# Patient Record
Sex: MALE | Race: WHITE | Employment: FULL TIME | URBAN - METROPOLITAN AREA
[De-identification: names, ages, dates, MRNs, and addresses within clinical notes are randomized per-mention and may not be internally consistent; named-entity substitution may affect disease eponyms.]

---

## 2017-03-03 ENCOUNTER — HOSPITAL ENCOUNTER (OUTPATIENT)
Age: 67
Discharge: HOME | End: 2017-03-03
Attending: EMERGENCY MEDICINE
Payer: MEDICARE

## 2017-03-03 ENCOUNTER — APPOINTMENT (OUTPATIENT)
Dept: GENERAL RADIOLOGY | Age: 67
End: 2017-03-03
Attending: EMERGENCY MEDICINE
Payer: MEDICARE

## 2017-03-03 VITALS
BODY MASS INDEX: 28.79 KG/M2 | DIASTOLIC BLOOD PRESSURE: 89 MMHG | RESPIRATION RATE: 16 BRPM | HEART RATE: 101 BPM | WEIGHT: 190 LBS | SYSTOLIC BLOOD PRESSURE: 123 MMHG | TEMPERATURE: 98 F | OXYGEN SATURATION: 98 % | HEIGHT: 68 IN

## 2017-03-03 DIAGNOSIS — S63.501A WRIST SPRAIN, RIGHT, INITIAL ENCOUNTER: Primary | ICD-10-CM

## 2017-03-03 PROCEDURE — 99203 OFFICE O/P NEW LOW 30 MIN: CPT

## 2017-03-03 PROCEDURE — 73110 X-RAY EXAM OF WRIST: CPT

## 2017-03-03 NOTE — ED PROVIDER NOTES
Patient Seen in: [facility] Immediate Care In [address] [place] [address]    History   Patient presents with:  Upper Extremity Injury (musculoskeletal)    Stated Complaint: RIGHT WRIST INJURY    HPI    HPI: Melani Marquez is a 78year old male who presents after an in src --    SpO2 03/03/17 1158 98 %   O2 Device 03/03/17 1158 None (Room air)       Current:/89 mmHg  Pulse 101  Temp([facility]) 97.6 °F (36.4 °C)  Resp 16  Ht 172.7 cm (5' 8\")  Wt 86.183 kg  BMI 28.90 kg/m2  SpO2 98%        Physical Exam      MENTAL STATUS

## 2017-06-03 PROCEDURE — 84443 ASSAY THYROID STIM HORMONE: CPT | Performed by: INTERNAL MEDICINE

## 2017-06-03 PROCEDURE — 80061 LIPID PANEL: CPT | Performed by: INTERNAL MEDICINE

## 2017-06-03 PROCEDURE — 82306 VITAMIN D 25 HYDROXY: CPT | Performed by: INTERNAL MEDICINE

## 2017-06-03 PROCEDURE — 80053 COMPREHEN METABOLIC PANEL: CPT | Performed by: INTERNAL MEDICINE

## 2017-06-03 PROCEDURE — 85025 COMPLETE CBC W/AUTO DIFF WBC: CPT | Performed by: INTERNAL MEDICINE

## 2018-02-19 ENCOUNTER — HOSPITAL ENCOUNTER (OUTPATIENT)
Age: 68
Discharge: HOME | End: 2018-02-19
Attending: PEDIATRICS
Payer: MEDICARE

## 2018-02-19 VITALS
RESPIRATION RATE: 18 BRPM | HEART RATE: 104 BPM | DIASTOLIC BLOOD PRESSURE: 79 MMHG | WEIGHT: 190 LBS | OXYGEN SATURATION: 95 % | BODY MASS INDEX: 28.14 KG/M2 | SYSTOLIC BLOOD PRESSURE: 149 MMHG | HEIGHT: 69 IN | TEMPERATURE: 98 F

## 2018-02-19 DIAGNOSIS — R09.89 FOREIGN BODY SENSATION IN THROAT: Primary | ICD-10-CM

## 2018-02-19 PROCEDURE — 99212 OFFICE O/P EST SF 10 MIN: CPT

## 2018-02-19 NOTE — ED NOTES
List of ENT provided and will call for follow up appointment. Easy non labored resp no drooling speech clear. Will call for follow up.

## 2018-02-19 NOTE — ED PROVIDER NOTES
Patient presents with:  FB in Throat (GI, respiratory)      HPI:     Karmen Gamble is a 75year old male who presents for evaluation of a chief complaint of foreign body sensation around his right sinus.   Patient states he was eating an orange last night an today.    Follow Up with:  Caren Sanchez MD  [address] [facility] [phone]      As needed    Merlin Godinez MD  [place] [address]  [phone]    Schedule an appointment as soon as possible f

## 2018-02-19 NOTE — ED INITIAL ASSESSMENT (HPI)
Eating an orange yesterday and feels he has a seed stuck inside his rt upper gum and went into his sinus. per pt.no visible puncture wound noted on soft palette

## 2018-04-27 ENCOUNTER — HOSPITAL ENCOUNTER (OUTPATIENT)
Dept: BONE DENSITY | Facility: HOSPITAL | Age: 68
Discharge: HOME | End: 2018-04-27
Attending: INTERNAL MEDICINE
Payer: MEDICARE

## 2018-04-27 DIAGNOSIS — M81.0 OSTEOPOROSIS, SENILE: ICD-10-CM

## 2018-04-27 PROCEDURE — 77080 DXA BONE DENSITY AXIAL: CPT | Performed by: INTERNAL MEDICINE

## 2018-06-08 PROCEDURE — 85025 COMPLETE CBC W/AUTO DIFF WBC: CPT | Performed by: INTERNAL MEDICINE

## 2018-06-08 PROCEDURE — 82607 VITAMIN B-12: CPT | Performed by: INTERNAL MEDICINE

## 2018-06-08 PROCEDURE — 84403 ASSAY OF TOTAL TESTOSTERONE: CPT | Performed by: INTERNAL MEDICINE

## 2018-06-08 PROCEDURE — 84443 ASSAY THYROID STIM HORMONE: CPT | Performed by: INTERNAL MEDICINE

## 2018-06-08 PROCEDURE — 80053 COMPREHEN METABOLIC PANEL: CPT | Performed by: INTERNAL MEDICINE

## 2018-06-08 PROCEDURE — 84481 FREE ASSAY (FT-3): CPT | Performed by: INTERNAL MEDICINE

## 2018-06-08 PROCEDURE — 84439 ASSAY OF FREE THYROXINE: CPT | Performed by: INTERNAL MEDICINE

## 2018-06-08 PROCEDURE — 82306 VITAMIN D 25 HYDROXY: CPT | Performed by: INTERNAL MEDICINE

## 2018-06-08 PROCEDURE — 80061 LIPID PANEL: CPT | Performed by: INTERNAL MEDICINE

## 2018-06-12 PROBLEM — M81.8 IDIOPATHIC OSTEOPOROSIS: Status: ACTIVE | Noted: 2018-06-12

## 2018-06-21 ENCOUNTER — NURSE ONLY (OUTPATIENT)
Dept: HEMATOLOGY/ONCOLOGY | Facility: HOSPITAL | Age: 68
End: 2018-06-21
Attending: INTERNAL MEDICINE
Payer: MEDICARE

## 2018-06-21 VITALS
TEMPERATURE: 98 F | RESPIRATION RATE: 16 BRPM | HEART RATE: 97 BPM | SYSTOLIC BLOOD PRESSURE: 140 MMHG | DIASTOLIC BLOOD PRESSURE: 77 MMHG

## 2018-06-21 DIAGNOSIS — M81.8 IDIOPATHIC OSTEOPOROSIS: Primary | ICD-10-CM

## 2018-06-21 PROCEDURE — 96372 THER/PROPH/DIAG INJ SC/IM: CPT

## 2018-06-21 NOTE — PROGRESS NOTES
Patient arrives for prolia injection. This will be patients first time receiving. Educated patient on what to expect, hand out given to patient. All questions answered. Prolia given in left upper arm, patient tolerated well.  Patient discharged in stable co

## 2018-12-26 ENCOUNTER — LAB ENCOUNTER (OUTPATIENT)
Dept: LAB | Age: 68
End: 2018-12-26
Attending: INTERNAL MEDICINE
Payer: MEDICARE

## 2018-12-26 ENCOUNTER — PATIENT MESSAGE (OUTPATIENT)
Dept: HEMATOLOGY/ONCOLOGY | Facility: HOSPITAL | Age: 68
End: 2018-12-26

## 2018-12-26 ENCOUNTER — TELEPHONE (OUTPATIENT)
Dept: HEMATOLOGY/ONCOLOGY | Facility: HOSPITAL | Age: 68
End: 2018-12-26

## 2018-12-26 DIAGNOSIS — I10 HTN (HYPERTENSION): Primary | ICD-10-CM

## 2018-12-26 PROCEDURE — 36415 COLL VENOUS BLD VENIPUNCTURE: CPT

## 2018-12-26 PROCEDURE — 80053 COMPREHEN METABOLIC PANEL: CPT

## 2018-12-26 NOTE — TELEPHONE ENCOUNTER
Called PCP office to get calicum level, patient has not been seen since June they would need to see him first. I called the patient to reschedule, he would like to reach out to the MD's office first to see if he can get the labs before canceling he will ca

## 2018-12-27 ENCOUNTER — NURSE ONLY (OUTPATIENT)
Dept: HEMATOLOGY/ONCOLOGY | Facility: HOSPITAL | Age: 68
End: 2018-12-27
Attending: INTERNAL MEDICINE
Payer: MEDICARE

## 2018-12-27 VITALS
TEMPERATURE: 98 F | RESPIRATION RATE: 18 BRPM | SYSTOLIC BLOOD PRESSURE: 140 MMHG | HEART RATE: 96 BPM | DIASTOLIC BLOOD PRESSURE: 85 MMHG

## 2018-12-27 DIAGNOSIS — M81.8 IDIOPATHIC OSTEOPOROSIS: Primary | ICD-10-CM

## 2018-12-27 PROCEDURE — 96372 THER/PROPH/DIAG INJ SC/IM: CPT

## 2018-12-27 NOTE — PROGRESS NOTES
Patient arrives for prolia injection. 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 Reviewed prolia purpose, pot SE's, when to call MD.  He voiced understanding.    Prolia given in left upper arm per pt request, patient aleks

## 2019-03-25 PROBLEM — M79.604 RIGHT LEG PAIN: Status: ACTIVE | Noted: 2019-03-25

## 2019-03-26 RX ORDER — MULTIVIT-MINERALS/FA/LYCOPENE 0.4 MG-6
TABLET ORAL
COMMUNITY

## 2019-03-26 RX ORDER — NIACIN 500 MG
500 TABLET ORAL
COMMUNITY

## 2019-03-29 ENCOUNTER — HOSPITAL ENCOUNTER (OUTPATIENT)
Dept: GENERAL RADIOLOGY | Facility: HOSPITAL | Age: 69
Discharge: HOME | End: 2019-03-29
Attending: ORTHOPAEDIC SURGERY
Payer: MEDICARE

## 2019-03-29 ENCOUNTER — HOSPITAL ENCOUNTER (OUTPATIENT)
Dept: NUCLEAR MEDICINE | Facility: HOSPITAL | Age: 69
Discharge: HOME | End: 2019-03-29
Attending: ORTHOPAEDIC SURGERY
Payer: MEDICARE

## 2019-03-29 DIAGNOSIS — M79.604 RIGHT LEG PAIN: ICD-10-CM

## 2019-03-29 PROCEDURE — 73552 X-RAY EXAM OF FEMUR 2/>: CPT | Performed by: ORTHOPAEDIC SURGERY

## 2019-03-29 PROCEDURE — 73562 X-RAY EXAM OF KNEE 3: CPT | Performed by: ORTHOPAEDIC SURGERY

## 2019-03-29 PROCEDURE — 78315 BONE IMAGING 3 PHASE: CPT | Performed by: ORTHOPAEDIC SURGERY

## 2019-03-29 PROCEDURE — 72100 X-RAY EXAM L-S SPINE 2/3 VWS: CPT | Performed by: ORTHOPAEDIC SURGERY

## 2019-04-03 ENCOUNTER — HOSPITAL ENCOUNTER (OUTPATIENT)
Dept: CT IMAGING | Facility: HOSPITAL | Age: 69
Discharge: HOME | End: 2019-04-03
Attending: ORTHOPAEDIC SURGERY
Payer: MEDICARE

## 2019-04-03 ENCOUNTER — HOSPITAL ENCOUNTER (OUTPATIENT)
Dept: GENERAL RADIOLOGY | Facility: HOSPITAL | Age: 69
Discharge: HOME | End: 2019-04-03
Attending: ORTHOPAEDIC SURGERY
Payer: MEDICARE

## 2019-04-03 VITALS
RESPIRATION RATE: 16 BRPM | HEIGHT: 68 IN | WEIGHT: 188.81 LBS | HEART RATE: 85 BPM | OXYGEN SATURATION: 95 % | BODY MASS INDEX: 28.61 KG/M2 | SYSTOLIC BLOOD PRESSURE: 132 MMHG | DIASTOLIC BLOOD PRESSURE: 92 MMHG

## 2019-04-03 DIAGNOSIS — M79.604 RIGHT LEG PAIN: ICD-10-CM

## 2019-04-03 PROCEDURE — 72132 CT LUMBAR SPINE W/DYE: CPT | Performed by: ORTHOPAEDIC SURGERY

## 2019-04-03 PROCEDURE — 62304 MYELOGRAPHY LUMBAR INJECTION: CPT | Performed by: ORTHOPAEDIC SURGERY

## 2019-04-03 RX ORDER — LIDOCAINE HYDROCHLORIDE 10 MG/ML
10 INJECTION, SOLUTION EPIDURAL; INFILTRATION; INTRACAUDAL; PERINEURAL ONCE
Status: COMPLETED | OUTPATIENT
Start: 2019-04-03 | End: 2019-04-03

## 2019-04-03 RX ORDER — OMEGA-3-ACID ETHYL ESTERS 1 G/1
1 CAPSULE, LIQUID FILLED ORAL DAILY
COMMUNITY

## 2019-04-03 RX ORDER — ASPIRIN 81 MG/1
TABLET, CHEWABLE ORAL DAILY
Status: ON HOLD | COMMUNITY
End: 2019-04-23

## 2019-04-03 RX ORDER — NAPROXEN SODIUM 550 MG/1
550 TABLET ORAL 2 TIMES DAILY WITH MEALS
Status: ON HOLD | COMMUNITY
End: 2019-04-23

## 2019-04-03 RX ORDER — LIDOCAINE HYDROCHLORIDE 10 MG/ML
INJECTION, SOLUTION EPIDURAL; INFILTRATION; INTRACAUDAL; PERINEURAL
Status: COMPLETED
Start: 2019-04-03 | End: 2019-04-03

## 2019-04-03 RX ADMIN — LIDOCAINE HYDROCHLORIDE 10 ML: 10 INJECTION, SOLUTION EPIDURAL; INFILTRATION; INTRACAUDAL; PERINEURAL at 11:00:00

## 2019-04-03 NOTE — IMAGING NOTE
S/P myelogram, brought to [facility] at 1310. Pt flat in bed, noted to be alert, conversant and comfortable. Vital signs monitored. Pt observed for an hour, able to lie flat with no problems. He was able to void afterwards with no complaints.   Pt whee

## 2019-04-03 NOTE — IMAGING NOTE
PATIENT ARRIVED TO [facility] RN REVIEWED HISTORY AND PROCEDURE. HAS TRANSPORTATION. QUESTIONS ANSWERED. STATES [place].  MEDS REVIEWED

## 2019-04-08 PROBLEM — M51.16 LUMBAR DISC DISEASE WITH RADICULOPATHY: Status: ACTIVE | Noted: 2019-04-08

## 2019-04-16 ENCOUNTER — HOSPITAL ENCOUNTER (INPATIENT)
Facility: HOSPITAL | Age: 69
LOS: 7 days | Discharge: NURSING, REHAB OR LONG-TERM CARE | DRG: 482 | End: 2019-04-24
Attending: EMERGENCY MEDICINE | Admitting: INTERNAL MEDICINE
Payer: MEDICARE

## 2019-04-16 ENCOUNTER — APPOINTMENT (OUTPATIENT)
Dept: GENERAL RADIOLOGY | Facility: HOSPITAL | Age: 69
DRG: 482 | End: 2019-04-16
Attending: EMERGENCY MEDICINE
Payer: MEDICARE

## 2019-04-16 DIAGNOSIS — S72.321A CLOSED DISPLACED TRANSVERSE FRACTURE OF SHAFT OF RIGHT FEMUR, INITIAL ENCOUNTER (HCC): Primary | ICD-10-CM

## 2019-04-16 PROCEDURE — 73552 X-RAY EXAM OF FEMUR 2/>: CPT | Performed by: EMERGENCY MEDICINE

## 2019-04-16 PROCEDURE — 71045 X-RAY EXAM CHEST 1 VIEW: CPT | Performed by: EMERGENCY MEDICINE

## 2019-04-16 RX ORDER — MORPHINE SULFATE 4 MG/ML
4 INJECTION, SOLUTION INTRAMUSCULAR; INTRAVENOUS ONCE
Status: COMPLETED | OUTPATIENT
Start: 2019-04-16 | End: 2019-04-16

## 2019-04-16 RX ORDER — MORPHINE SULFATE 4 MG/ML
4 INJECTION, SOLUTION INTRAMUSCULAR; INTRAVENOUS ONCE
Status: COMPLETED | OUTPATIENT
Start: 2019-04-16 | End: 2019-04-17

## 2019-04-17 ENCOUNTER — ANESTHESIA (OUTPATIENT)
Dept: SURGERY | Facility: HOSPITAL | Age: 69
DRG: 482 | End: 2019-04-17
Payer: MEDICARE

## 2019-04-17 ENCOUNTER — ANESTHESIA EVENT (OUTPATIENT)
Dept: SURGERY | Facility: HOSPITAL | Age: 69
DRG: 482 | End: 2019-04-17
Payer: MEDICARE

## 2019-04-17 ENCOUNTER — APPOINTMENT (OUTPATIENT)
Dept: GENERAL RADIOLOGY | Facility: HOSPITAL | Age: 69
DRG: 482 | End: 2019-04-17
Attending: ORTHOPAEDIC SURGERY
Payer: MEDICARE

## 2019-04-17 ENCOUNTER — APPOINTMENT (OUTPATIENT)
Dept: MRI IMAGING | Facility: HOSPITAL | Age: 69
DRG: 482 | End: 2019-04-17
Attending: ORTHOPAEDIC SURGERY
Payer: MEDICARE

## 2019-04-17 PROBLEM — S72.321A CLOSED DISPLACED TRANSVERSE FRACTURE OF SHAFT OF RIGHT FEMUR, INITIAL ENCOUNTER (HCC): Status: ACTIVE | Noted: 2019-04-17

## 2019-04-17 PROCEDURE — BQ13ZZZ FLUOROSCOPY OF RIGHT FEMUR: ICD-10-PCS | Performed by: ORTHOPAEDIC SURGERY

## 2019-04-17 PROCEDURE — 73720 MRI LWR EXTREMITY W/O&W/DYE: CPT | Performed by: ORTHOPAEDIC SURGERY

## 2019-04-17 PROCEDURE — 76000 FLUOROSCOPY <1 HR PHYS/QHP: CPT | Performed by: ORTHOPAEDIC SURGERY

## 2019-04-17 PROCEDURE — 0QS836Z REPOSITION RIGHT FEMORAL SHAFT WITH INTRAMEDULLARY INTERNAL FIXATION DEVICE, PERCUTANEOUS APPROACH: ICD-10-PCS | Performed by: ORTHOPAEDIC SURGERY

## 2019-04-17 DEVICE — SCREW LCK 5.0X48MM STR: Type: IMPLANTABLE DEVICE | Site: FEMUR | Status: FUNCTIONAL

## 2019-04-17 RX ORDER — HYDROMORPHONE HYDROCHLORIDE 1 MG/ML
0.2 INJECTION, SOLUTION INTRAMUSCULAR; INTRAVENOUS; SUBCUTANEOUS EVERY 2 HOUR PRN
Status: DISCONTINUED | OUTPATIENT
Start: 2019-04-17 | End: 2019-04-24

## 2019-04-17 RX ORDER — HYDROCODONE BITARTRATE AND ACETAMINOPHEN 5; 325 MG/1; MG/1
2 TABLET ORAL AS NEEDED
Status: DISCONTINUED | OUTPATIENT
Start: 2019-04-17 | End: 2019-04-17 | Stop reason: HOSPADM

## 2019-04-17 RX ORDER — ROCURONIUM BROMIDE 10 MG/ML
INJECTION, SOLUTION INTRAVENOUS AS NEEDED
Status: DISCONTINUED | OUTPATIENT
Start: 2019-04-17 | End: 2019-04-17 | Stop reason: SURG

## 2019-04-17 RX ORDER — FAMOTIDINE 20 MG/1
20 TABLET ORAL 2 TIMES DAILY
Status: DISCONTINUED | OUTPATIENT
Start: 2019-04-17 | End: 2019-04-24

## 2019-04-17 RX ORDER — CEFAZOLIN SODIUM/WATER 2 G/20 ML
2 SYRINGE (ML) INTRAVENOUS EVERY 8 HOURS
Status: COMPLETED | OUTPATIENT
Start: 2019-04-17 | End: 2019-04-18

## 2019-04-17 RX ORDER — MORPHINE SULFATE 2 MG/ML
2 INJECTION, SOLUTION INTRAMUSCULAR; INTRAVENOUS EVERY 10 MIN PRN
Status: DISCONTINUED | OUTPATIENT
Start: 2019-04-17 | End: 2019-04-17 | Stop reason: HOSPADM

## 2019-04-17 RX ORDER — HYDROMORPHONE HYDROCHLORIDE 1 MG/ML
0.4 INJECTION, SOLUTION INTRAMUSCULAR; INTRAVENOUS; SUBCUTANEOUS EVERY 2 HOUR PRN
Status: DISCONTINUED | OUTPATIENT
Start: 2019-04-17 | End: 2019-04-24

## 2019-04-17 RX ORDER — GLYCOPYRROLATE 0.2 MG/ML
INJECTION, SOLUTION INTRAMUSCULAR; INTRAVENOUS AS NEEDED
Status: DISCONTINUED | OUTPATIENT
Start: 2019-04-17 | End: 2019-04-17 | Stop reason: SURG

## 2019-04-17 RX ORDER — ONDANSETRON 2 MG/ML
4 INJECTION INTRAMUSCULAR; INTRAVENOUS EVERY 6 HOURS PRN
Status: DISCONTINUED | OUTPATIENT
Start: 2019-04-17 | End: 2019-04-24

## 2019-04-17 RX ORDER — SODIUM CHLORIDE, SODIUM LACTATE, POTASSIUM CHLORIDE, CALCIUM CHLORIDE 600; 310; 30; 20 MG/100ML; MG/100ML; MG/100ML; MG/100ML
INJECTION, SOLUTION INTRAVENOUS CONTINUOUS PRN
Status: DISCONTINUED | OUTPATIENT
Start: 2019-04-17 | End: 2019-04-17 | Stop reason: SURG

## 2019-04-17 RX ORDER — HYDROMORPHONE HYDROCHLORIDE 1 MG/ML
0.2 INJECTION, SOLUTION INTRAMUSCULAR; INTRAVENOUS; SUBCUTANEOUS
Status: DISCONTINUED | OUTPATIENT
Start: 2019-04-17 | End: 2019-04-19

## 2019-04-17 RX ORDER — ONDANSETRON 2 MG/ML
4 INJECTION INTRAMUSCULAR; INTRAVENOUS ONCE AS NEEDED
Status: DISCONTINUED | OUTPATIENT
Start: 2019-04-17 | End: 2019-04-17 | Stop reason: HOSPADM

## 2019-04-17 RX ORDER — DEXAMETHASONE SODIUM PHOSPHATE 4 MG/ML
VIAL (ML) INJECTION AS NEEDED
Status: DISCONTINUED | OUTPATIENT
Start: 2019-04-17 | End: 2019-04-17 | Stop reason: SURG

## 2019-04-17 RX ORDER — HYDROCODONE BITARTRATE AND ACETAMINOPHEN 7.5; 325 MG/1; MG/1
1 TABLET ORAL
Status: DISCONTINUED | OUTPATIENT
Start: 2019-04-17 | End: 2019-04-19

## 2019-04-17 RX ORDER — CEFAZOLIN SODIUM/WATER 2 G/20 ML
2 SYRINGE (ML) INTRAVENOUS ONCE
Status: DISCONTINUED | OUTPATIENT
Start: 2019-04-18 | End: 2019-04-17

## 2019-04-17 RX ORDER — SODIUM PHOSPHATE, DIBASIC AND SODIUM PHOSPHATE, MONOBASIC 7; 19 G/133ML; G/133ML
1 ENEMA RECTAL ONCE AS NEEDED
Status: DISCONTINUED | OUTPATIENT
Start: 2019-04-17 | End: 2019-04-24

## 2019-04-17 RX ORDER — POLYVINYL ALCOHOL 14 MG/ML
1 SOLUTION/ DROPS OPHTHALMIC EVERY 4 HOURS PRN
Status: DISCONTINUED | OUTPATIENT
Start: 2019-04-17 | End: 2019-04-24

## 2019-04-17 RX ORDER — DOCUSATE SODIUM 100 MG/1
100 CAPSULE, LIQUID FILLED ORAL 2 TIMES DAILY
Status: DISCONTINUED | OUTPATIENT
Start: 2019-04-17 | End: 2019-04-24

## 2019-04-17 RX ORDER — HYDROCODONE BITARTRATE AND ACETAMINOPHEN 5; 325 MG/1; MG/1
1 TABLET ORAL AS NEEDED
Status: DISCONTINUED | OUTPATIENT
Start: 2019-04-17 | End: 2019-04-17 | Stop reason: HOSPADM

## 2019-04-17 RX ORDER — CEFAZOLIN SODIUM/WATER 2 G/20 ML
2 SYRINGE (ML) INTRAVENOUS
Status: COMPLETED | OUTPATIENT
Start: 2019-04-17 | End: 2019-04-17

## 2019-04-17 RX ORDER — 0.9 % SODIUM CHLORIDE 0.9 %
VIAL (ML) INJECTION
Status: COMPLETED
Start: 2019-04-17 | End: 2019-04-17

## 2019-04-17 RX ORDER — DEXTROSE, SODIUM CHLORIDE, SODIUM LACTATE, POTASSIUM CHLORIDE, AND CALCIUM CHLORIDE 5; .6; .31; .03; .02 G/100ML; G/100ML; G/100ML; G/100ML; G/100ML
INJECTION, SOLUTION INTRAVENOUS CONTINUOUS
Status: DISCONTINUED | OUTPATIENT
Start: 2019-04-17 | End: 2019-04-19

## 2019-04-17 RX ORDER — MORPHINE SULFATE 4 MG/ML
4 INJECTION, SOLUTION INTRAMUSCULAR; INTRAVENOUS EVERY 10 MIN PRN
Status: DISCONTINUED | OUTPATIENT
Start: 2019-04-17 | End: 2019-04-17 | Stop reason: HOSPADM

## 2019-04-17 RX ORDER — HALOPERIDOL 5 MG/ML
0.25 INJECTION INTRAMUSCULAR ONCE AS NEEDED
Status: DISCONTINUED | OUTPATIENT
Start: 2019-04-17 | End: 2019-04-17 | Stop reason: HOSPADM

## 2019-04-17 RX ORDER — ONDANSETRON 2 MG/ML
INJECTION INTRAMUSCULAR; INTRAVENOUS AS NEEDED
Status: DISCONTINUED | OUTPATIENT
Start: 2019-04-17 | End: 2019-04-17 | Stop reason: SURG

## 2019-04-17 RX ORDER — POLYETHYLENE GLYCOL 3350 17 G/17G
17 POWDER, FOR SOLUTION ORAL DAILY PRN
Status: DISCONTINUED | OUTPATIENT
Start: 2019-04-17 | End: 2019-04-19

## 2019-04-17 RX ORDER — SODIUM CHLORIDE, SODIUM LACTATE, POTASSIUM CHLORIDE, CALCIUM CHLORIDE 600; 310; 30; 20 MG/100ML; MG/100ML; MG/100ML; MG/100ML
INJECTION, SOLUTION INTRAVENOUS CONTINUOUS
Status: DISCONTINUED | OUTPATIENT
Start: 2019-04-17 | End: 2019-04-17 | Stop reason: HOSPADM

## 2019-04-17 RX ORDER — BISACODYL 10 MG
10 SUPPOSITORY, RECTAL RECTAL
Status: DISCONTINUED | OUTPATIENT
Start: 2019-04-17 | End: 2019-04-24

## 2019-04-17 RX ORDER — HYDROCODONE BITARTRATE AND ACETAMINOPHEN 7.5; 325 MG/1; MG/1
1 TABLET ORAL EVERY 4 HOURS PRN
Status: DISCONTINUED | OUTPATIENT
Start: 2019-04-17 | End: 2019-04-24

## 2019-04-17 RX ORDER — MORPHINE SULFATE 10 MG/ML
6 INJECTION, SOLUTION INTRAMUSCULAR; INTRAVENOUS EVERY 10 MIN PRN
Status: DISCONTINUED | OUTPATIENT
Start: 2019-04-17 | End: 2019-04-17 | Stop reason: HOSPADM

## 2019-04-17 RX ORDER — NEOSTIGMINE METHYLSULFATE 0.5 MG/ML
INJECTION INTRAVENOUS AS NEEDED
Status: DISCONTINUED | OUTPATIENT
Start: 2019-04-17 | End: 2019-04-17 | Stop reason: SURG

## 2019-04-17 RX ORDER — HYDROMORPHONE HYDROCHLORIDE 1 MG/ML
0.8 INJECTION, SOLUTION INTRAMUSCULAR; INTRAVENOUS; SUBCUTANEOUS EVERY 2 HOUR PRN
Status: DISCONTINUED | OUTPATIENT
Start: 2019-04-17 | End: 2019-04-24

## 2019-04-17 RX ORDER — NALOXONE HYDROCHLORIDE 0.4 MG/ML
80 INJECTION, SOLUTION INTRAMUSCULAR; INTRAVENOUS; SUBCUTANEOUS AS NEEDED
Status: DISCONTINUED | OUTPATIENT
Start: 2019-04-17 | End: 2019-04-17 | Stop reason: HOSPADM

## 2019-04-17 RX ORDER — FAMOTIDINE 10 MG/ML
20 INJECTION, SOLUTION INTRAVENOUS 2 TIMES DAILY
Status: DISCONTINUED | OUTPATIENT
Start: 2019-04-17 | End: 2019-04-19

## 2019-04-17 RX ORDER — HYDRALAZINE HYDROCHLORIDE 20 MG/ML
INJECTION INTRAMUSCULAR; INTRAVENOUS AS NEEDED
Status: DISCONTINUED | OUTPATIENT
Start: 2019-04-17 | End: 2019-04-17 | Stop reason: SURG

## 2019-04-17 RX ORDER — SODIUM CHLORIDE 0.9 % (FLUSH) 0.9 %
10 SYRINGE (ML) INJECTION AS NEEDED
Status: DISCONTINUED | OUTPATIENT
Start: 2019-04-17 | End: 2019-04-24

## 2019-04-17 RX ORDER — CEFAZOLIN SODIUM/WATER 2 G/20 ML
2 SYRINGE (ML) INTRAVENOUS ONCE
Status: DISCONTINUED | OUTPATIENT
Start: 2019-04-18 | End: 2019-04-24

## 2019-04-17 RX ORDER — LIDOCAINE HYDROCHLORIDE 10 MG/ML
INJECTION, SOLUTION EPIDURAL; INFILTRATION; INTRACAUDAL; PERINEURAL AS NEEDED
Status: DISCONTINUED | OUTPATIENT
Start: 2019-04-17 | End: 2019-04-17 | Stop reason: SURG

## 2019-04-17 RX ADMIN — GLYCOPYRROLATE 0.6 MG: 0.2 INJECTION, SOLUTION INTRAMUSCULAR; INTRAVENOUS at 19:26:00

## 2019-04-17 RX ADMIN — CEFAZOLIN SODIUM/WATER 2 G: 2 G/20 ML SYRINGE (ML) INTRAVENOUS at 16:16:00

## 2019-04-17 RX ADMIN — SODIUM CHLORIDE, SODIUM LACTATE, POTASSIUM CHLORIDE, CALCIUM CHLORIDE: 600; 310; 30; 20 INJECTION, SOLUTION INTRAVENOUS at 15:56:00

## 2019-04-17 RX ADMIN — LIDOCAINE HYDROCHLORIDE 50 MG: 10 INJECTION, SOLUTION EPIDURAL; INFILTRATION; INTRACAUDAL; PERINEURAL at 16:00:00

## 2019-04-17 RX ADMIN — ONDANSETRON 4 MG: 2 INJECTION INTRAMUSCULAR; INTRAVENOUS at 16:10:00

## 2019-04-17 RX ADMIN — ROCURONIUM BROMIDE 40 MG: 10 INJECTION, SOLUTION INTRAVENOUS at 16:00:00

## 2019-04-17 RX ADMIN — SODIUM CHLORIDE, SODIUM LACTATE, POTASSIUM CHLORIDE, CALCIUM CHLORIDE: 600; 310; 30; 20 INJECTION, SOLUTION INTRAVENOUS at 19:40:00

## 2019-04-17 RX ADMIN — HYDRALAZINE HYDROCHLORIDE 20 MG: 20 INJECTION INTRAMUSCULAR; INTRAVENOUS at 17:52:00

## 2019-04-17 RX ADMIN — DEXAMETHASONE SODIUM PHOSPHATE 4 MG: 4 MG/ML VIAL (ML) INJECTION at 16:10:00

## 2019-04-17 RX ADMIN — NEOSTIGMINE METHYLSULFATE 4 MG: 0.5 INJECTION INTRAVENOUS at 19:26:00

## 2019-04-17 NOTE — ED NOTES
[facility] IS NOT AFFILIATED WITH PT'S WORK [facility] REGARDING THE [facility] COMPENSATION.

## 2019-04-17 NOTE — PLAN OF CARE
Problem: Patient Centered Care  Goal: Patient preferences are identified and integrated in the patient's plan of care  Description  Interventions:  - What would you like us to know as we care for you?   - Provide timely, complete, and accurate informatio Progressing     Problem: SKIN/TISSUE INTEGRITY - ADULT  Goal: Skin integrity remains intact  Description  INTERVENTIONS  - Assess and document risk factors for pressure ulcer development  - Assess and document skin integrity  - Monitor for areas of redness

## 2019-04-17 NOTE — ANESTHESIA PROCEDURE NOTES
Airway  Date/Time: 4/17/2019 4:20 PM  Urgency: elective    Airway not difficult    General Information and Staff    Patient location during procedure: OR  Anesthesiologist: Sara Saunders MD  Resident/CRNA: Chelsi Hernandez CRNA  Performed: CRNA     Ind

## 2019-04-17 NOTE — DOWNTIME EVENT NOTE
The EMR was down for 3 hours on 4/17/2019. This RN was responsible for completing the paper charting during this time period.      The following information was re-entered into the system by Carly AVERY: Allergies, MAR, Flowsheets, Admission documentation

## 2019-04-17 NOTE — ED NOTES
[facility] [address] [place] [address] [address] [facility]  Srinivasa Stephensonaurorawilli [phone]               Thank you for choosing us for your health care visit with Germain Sue DO.   We are glad to serve you and happy to provide you wi Wife Mary Isaias would like floor nurse to please contact her with all updates  Cell [phone] [place] [phone] Reason for Today's Visit     Thyroid Problem           Medical Issues Discussed Today     Hypothyroidism    Laboratory examination ordered as part of a routine general medical examination        Depression screen        BMI 29.0-29.9,adult        Visit for in the nodule make too much thyroid hormone and stop hormone production in the rest of the thyroid gland.   Common symptoms include:  · Shaking, nervousness, irritability  · Feeling hot  · A rapid, irregular heartbeat  · Muscle weakness, fatigue  · More matilde These medications were sent to [place] [address] [address] [facility], [address] [place], [place] [address]o [address] [place] [address] , [phone], [address] [place] [id number]     Phone:  [phone]    - Levothyroxine Sodium 88 MCG Ta active are less likely to develop some chronic diseases than adults who are inactive.      HOW TO GET STARTED: HOW TO STAY MOTIVATED:   Start activities slowly and build up over time Do what you like   Get your heart pumping – brisk walking, biking, swimmin

## 2019-04-17 NOTE — ED INITIAL ASSESSMENT (HPI)
Patient here after a trip and fall earlier this evening. Patient has swelling to his right thigh. CMS intact. Patient denies LOC and did not hit head.

## 2019-04-17 NOTE — ED PROVIDER NOTES
Patient Seen in: [facility] AND [facility] Emergency Department    History   No chief complaint on file.     Stated Complaint: fall with swollen thigh and pain s/p fall     HPI    69-year-old male with history of hypercholesterolemia and arthritis presents with c Current:/82   Pulse 88   Temp 98 °F (36.7 °C) (Oral)   Resp 20   SpO2 95%         Physical Exam        General Appearance: alert, no distress  Eyes: pupils equal and round no injection  Respiratory: chest is non tender to palpation, breath soun individual orders. URINALYSIS WITH CULTURE REFLEX   CBC W/ DIFFERENTIAL         EKG    Rate, intervals and axes as noted on EKG Report.   Rate: 93  Rhythm: Sinus Rhythm  Axis: Normal  Reading: Nonspecific EKG               MDM   X-ray and lab results note

## 2019-04-17 NOTE — CONSULTS
[facility]    PATIENT'S NAME: Sathya Keita   ATTENDING PHYSICIAN: Adam Chicas MD   CONSULTING PHYSICIAN: Jia Bush MD   PATIENT ACCOUNT#:   [id number]    LOCATION:  [address] [place] [address] #:   [id number]       DATE OF [facility] demonstrated some mild arthritic changes about the right hip seen, diaphysis mid to distal; oval radiosclerotic areas were seen intramedullary. Periosteal new bone formation was noted in the diaphysis, greater posteriorly.   Correlation of the bone scan an proximal distal component and posterior displacement of the distal component. Slight lateral angulation noted distally. 2.   Right knee tricompartmental osteoarthritis.   No observation of any pathologic component was described by Dr. Janeth Muse, (the radi asymmetry and/or leg length discrepancy; exacerbation of lumbar spine symptoms; exacerbation of numbness, tingling, and weakness; thromboembolic disease, including deep vein thrombosis and/or pulmonary embolus and its potential sequelae despite the use of

## 2019-04-17 NOTE — ED NOTES
Orders for admission, patient is aware of plan and ready to go upstairs. Any questions, please call ED RN Lavell Hanley  at extension [phone]. Pt just received Morphine.

## 2019-04-17 NOTE — H&P
[facility]    History and Physical    Eden Rose Patient Status:  Inpatient    1950 MRN [id number]   Location [facility] PRE OP RECOVERY Attending Violet Hill MD   Hosp Day # 0 PCP Roseann Chase MD     Date:   Naproxen Sodium 550 MG Oral Tab Take 550 mg by mouth 2 (two) times daily with meals. Omega-3-acid Ethyl Esters 1 g Oral Cap Take 1 g by mouth daily. aspirin 81 MG Oral Chew Tab Chew by mouth daily.    acetaminophen 500mg    Multiple Vitamins-Minerals (Q BUN 33 (H) 04/16/2019     04/16/2019    K 3.9 04/16/2019     04/16/2019    CO2 28.0 04/16/2019     (H) 04/16/2019    CA 9.9 04/16/2019    ALB 4.1 12/26/2018    ALKPHO 43 12/26/2018    BILT 0.9 12/26/2018    TP 6.8 12/26/2018    AST 28 1 ECG Report  Interpretation  -------------------------- Sinus Rhythm - Nonspecific T-abnormality. Low voltage with rightward P-axis and rotation -possible pulmonary disease.  ABNORMAL When compared with ECG of 09/15/04 13:34:24 No significant changes have o

## 2019-04-17 NOTE — PLAN OF CARE
Problem: Patient Centered Care  Goal: Patient preferences are identified and integrated in the patient's plan of care  Description  Interventions:  - What would you like us to know as we care for you?  I fell at work  - Provide timely, complete, and accur injury  Description  INTERVENTIONS:  - Assess pt frequently for physical needs  - Identify cognitive and physical deficits and behaviors that affect risk of falls.   - [place] fall precautions as indicated by assessment.  - Educate pt/family on patient sa personal belongings while patient is in surgery. Will continue to monitor.

## 2019-04-17 NOTE — ANESTHESIA PREPROCEDURE EVALUATION
Anesthesia PreOp Note    HPI:     Iman Rodriguez is a 70year old male who presents for preoperative consultation requested by: Es Steen MD    Date of Surgery: 4/16/2019 - 4/17/2019    Procedure(s):   FEMUR IM NAIL  Indication: Closed displaced tr surgery with metal         Medications Prior to Admission:  Naproxen Sodium 550 MG Oral Tab Take 550 mg by mouth 2 (two) times daily with meals. Disp:  Rfl:  Past Week at Unknown time   Omega-3-acid Ethyl Esters 1 g Oral Cap Take 1 g by mouth daily.  Disp: of children: Not on file      Years of education: Not on file      Highest education level: Not on file    Occupational History      Not on file    Social Needs      Financial resource strain: Not on file      Food insecurity:        Worry: Not on file 04/17/2019    .0 04/17/2019     Lab Results   Component Value Date     04/16/2019    K 3.9 04/16/2019     04/16/2019    CO2 28.0 04/16/2019    BUN 33 (H) 04/16/2019    CREATSERUM 1.22 04/16/2019     (H) 04/16/2019    CA 9.9 04/16/

## 2019-04-17 NOTE — ANESTHESIA PROCEDURE NOTES
Peripheral IV  Date/Time: 4/17/2019 4:04 PM  Inserted by: Chelsi Hernandez CRNA    Placement  Needle size: 18 G  Laterality: left  Location: hand  Local anesthetic: none  Site prep: alcohol  Technique: anatomical landmarks  Attempts: 1

## 2019-04-18 NOTE — PROGRESS NOTES
[facility]    Progress Note    Macy Mayen Patient Status:  Inpatient    1950 MRN [id number]   Location [facility] 4W/SW/SE Attending Frederick Betancourt MD   Hosp Day # 1 PCP Deloris [place] MD     Date of Admission:  2019 Daily PRN   FLEET ENEMA (FLEET) 7-19 GM/118ML enema 133 mL 1 enema Rectal Once PRN   rivaroxaban (XARELTO) tab 10 mg 10 mg Oral Q24H   HYDROmorphone HCl (DILAUDID) 1 MG/ML injection 0.2 mg 0.2 mg Intravenous Q2H PRN   Or      HYDROmorphone HCl (DILAUDID) 1 tobacco: Never Used    Alcohol use: No      Alcohol/week: 0.0 oz    Drug use: No       PHYSICAL EXAM:   /66 (BP Location: Right arm)   Pulse 113   Temp 98.3 °F (36.8 °C) (Oral)   Resp 18   Ht 66\"   Wt 198 lb 6.4 oz   SpO2 96%   BMI 32.02 kg/m²   Res loculated collection to suggest hematoma. Dictated by (CST): Lea Huerta MD on 4/17/2019 at 12:03     Approved by (CST): Lea Huerta MD on 4/17/2019 at 12:12          Xr Chest Ap Portable  (cpt=71045)    Result Date: 4/16/2019  CONCLUSION:  1.  No acut

## 2019-04-18 NOTE — PLAN OF CARE
Problem: Patient Centered Care  Goal: Patient preferences are identified and integrated in the patient's plan of care  Description  Interventions:  - What would you like us to know as we care for you?  I fell at work  - Provide timely, complete, and accur injury  Description  INTERVENTIONS:  - Assess pt frequently for physical needs  - Identify cognitive and physical deficits and behaviors that affect risk of falls.   - [place] fall precautions as indicated by assessment.  - Educate pt/family on patient sa w/ side rails up. Patient has been educated and is compliant w/ call light system. Patient is up with 2 assist and walker. Patient has been educated on hand washing, labs have been reviewed. No signs and symptoms of infection systemically or locally.  Surgi

## 2019-04-18 NOTE — PLAN OF CARE
Problem: Patient Centered Care  Goal: Patient preferences are identified and integrated in the patient's plan of care  Description  Interventions:  - What would you like us to know as we care for you?  I fell at work  - Provide timely, complete, and accur injury  Description  INTERVENTIONS:  - Assess pt frequently for physical needs  - Identify cognitive and physical deficits and behaviors that affect risk of falls.   - [place] fall precautions as indicated by assessment.  - Educate pt/family on patient sa catheter in place. Dr. Jill Haddad made aware that pt triggered sepsis screening and that his heart rate is sustaining at 128. Orders received. HR now at 118 after pain medication and metoprolol given. He is on 2 L O2. Dressing to R hip C/D/I.  Hemovac in place t

## 2019-04-18 NOTE — PROGRESS NOTES
[facility]    Progress Note    Shaw Mc Patient Status:  Inpatient    1950 MRN [id number]   Location [facility] 4W/SW/SE Attending Nikia Lozoya MD   Hosp Day # 1 PCP Cheryl Peng MD        Subjective:     Camille Valero ALT 30 12/26/2018    T4F 0.81 06/08/2018    TSH 1.45 06/08/2018    PSA 1.0 06/08/2018    B12 709 06/08/2018       Xr Femur Min 2 Views Right (cpt=73552)    Result Date: 4/16/2019  CONCLUSION:  1.  Displaced proximal to mid shaft right femur fracture with MD        Assessment and Plan:     *Fall with R Closed displaced transverse femur fracture   S/p R femur reduction w/ intramedullary nailing by Dr. Lj Navarro 4/17/19  -PT/OT  [facility] for pain     *Osteoporosis   Cont prolia injections    *Hx

## 2019-04-18 NOTE — BRIEF OP NOTE
[facility]   Brief Op Note    Patients Name: Jeanne Lilly  Attending Physician: Kaitlin Cao MD  Operating Physician: Lisa Canela MD  CSN: [id number]     Location:  OR  MRN: [id number]    YOB: 1950  Admission D

## 2019-04-18 NOTE — CM/SW NOTE
Workers comp case. [employer] [phone], fax [phone], adjustor Brea Orantes [phone]. SW spoke with the adjustor, she is stating that patient hung up on her when she tried to talk to him about his DC needs.  Patient needs rehabilitation u

## 2019-04-18 NOTE — OCCUPATIONAL THERAPY NOTE
OCCUPATIONAL THERAPY EVALUATION - INPATIENT      Room Number: 431/431-A  Evaluation Date: 4/18/2019  Type of Evaluation: Initial  Presenting Problem: trip and fall at work; closed displaced transverse fx of shaft of R femur s/p femoral nailing     Physicia Approx Degree of Impairment: 59.67% has been calculated based on documentation in the [facility] '6 clicks' Inpatient Daily Activity Short Form. Research supports that patients with this level of impairment may benefit from ANURAG.     DISCHARGE RECOMMENDATIONS  O Bearing Restriction: R lower extremity           L Lower Extremity: Toe Touch Weight Bearing    PAIN ASSESSMENT  Rating: Unable to rate  Location: R hip, minimal   Management Techniques: Activity promotion; Body mechanics;Repositioning    ACTIVITY TOLERANCE Provided: role of OT, activity promotion, WB restriction, compensatory strategies for mobility and LE dressing     Patient End of Session: Up in chair;Needs met;Call light within reach;RN aware of session/findings; Alarm set    OT Goals  Patient self-stated

## 2019-04-18 NOTE — ANESTHESIA POSTPROCEDURE EVALUATION
Patient: Chuckie Velasquez    Procedure Summary     Date:  04/17/19 Room / Location:  [facility] OR  / [facility] OR    Anesthesia Start:  2938 Anesthesia Stop:  1939    Procedure:  FEMUR IM NAIL (Right ) Diagnosis:       Closed displaced transverse fracture of

## 2019-04-18 NOTE — PHYSICAL THERAPY NOTE
PT PM session: Pt education with bed mobility to edge of bed with mod A x 1. Pt education with transfers and pre gait activity amb 4' with a RW and RLE TTWB to chair for therex in chair ankle pumps, quad sets, glut sets, LAQ x 10 reps as tolerable.  Pt assi

## 2019-04-18 NOTE — PHYSICAL THERAPY NOTE
PHYSICAL THERAPY EVALUATION - INPATIENT     Room Number: 431/431-A  Evaluation Date: 4/18/2019  Type of Evaluation: Initial   Physician Order: PT Eval and Treat    Presenting Problem: transverse fx femur s/p fall at home  Reason for Therapy: Mobility Dysf training;Strengthening;Range of motion;Transfer training;Balance training  Rehab Potential : Fair  Frequency (Obs): BID       PHYSICAL THERAPY MEDICAL/SOCIAL HISTORY     History related to current admission: [place], previous spinal surgery with LE weakness ch hip pinning, TTWB RLE limited step length)  Fall Risk: High fall risk    WEIGHT BEARING RESTRICTION  Weight Bearing Restriction: R lower extremity           R Lower Extremity: Toe Touch Weight Bearing    PAIN ASSESSMENT  Ratin  Location: RLE  Managemen to toilet chair toilet and to chair)  Assistive Device: Rolling walker  Pattern: R Decreased stance time(RLE TTWB decreased step length poor floor clearance )  Stoop/Curb Assistance: Not tested  Comment : fatigues quickly with UE's and requires facilitatio

## 2019-04-18 NOTE — OPERATIVE REPORT
[facility]    PATIENT'S NAME: Chrissy Tran   ATTENDING PHYSICIAN: Laron Gardner MD   OPERATING PHYSICIAN: Nimisha Basilio MD   PATIENT ACCOUNT#:   [de-identified]    LOCATION:  [address] [place] [address] #:   [id number]       DATE OF MADELYN konrad using a cannulated curved awl. A starting point was initiated in the tip of the greater trochanter, position of which was checked on multiple C-arm fluoroscopic views. A ball-tipped guidewire was then placed in the proximal aspect of the femur.   Two Excellent position, alignment, and length noted on multiple C-arm fluoroscopic views. Depth gauging determined that a size 895 mm helical blade would be the appropriate size. Drilling of the lateral femoral cortex was performed.   The helical blade was th was introduced and brought out deep and brought out distally and anteriorly. The tensor fascia konrad was closed with 0 Vicryl.   Subcutaneous tissue was closed in layers with 0 Vicryl deep and 3-0 Monocryl in the superficial subcutaneous tissue, and the ski

## 2019-04-19 RX ORDER — POLYETHYLENE GLYCOL 3350 17 G/17G
17 POWDER, FOR SOLUTION ORAL DAILY
Status: DISCONTINUED | OUTPATIENT
Start: 2019-04-19 | End: 2019-04-24

## 2019-04-19 NOTE — PHYSICAL THERAPY NOTE
PHYSICAL THERAPY TREATMENT NOTE - INPATIENT     Room Number: 431/431-A       Presenting Problem: transverse fx femur s/p fall at home    Problem List  Principal Problem:    Closed displaced transverse fracture of shaft of right femur, initial encounter (HC mechanics; Endurance; Patient education;Gait training;Strengthening;Transfer training;Balance training    SUBJECTIVE  Pt was agreeable to therapy session.      OBJECTIVE  Precautions: Limb alert - right    WEIGHT BEARING RESTRICTION  Weight Bearing Restrictio aware of session/findings; All patient questions and concerns addressed    CURRENT GOALS     Goals to be met by: 2 wks  Patient Goal Patient's self-stated goal is: Return home   Goal #1 Patient is able to demonstrate supine - sit EOB @ level: minimum assist

## 2019-04-19 NOTE — PROGRESS NOTES
[facility]OSP - [facility]    Progress Note    Chuckramiro Martins Patient Status:  Inpatient    1950 MRN [id number]   Location [facility] 4W/SW/SE Attending Jocelyne Ramirez MD   Hosp Day # 2 PCP Fercho Kerr MD        Subjective:     Bridgette Thompson AST 28 12/26/2018    ALT 30 12/26/2018    T4F 0.81 06/08/2018    TSH 1.45 06/08/2018    PSA 1.0 06/08/2018    B12 709 06/08/2018       Mri Thigh (w+wo), Right (cpt=73720)    Result Date: 4/17/2019  CONCLUSION:   Oblique, angulated, impacted fracture of th

## 2019-04-19 NOTE — PLAN OF CARE
Problem: Patient Centered Care  Goal: Patient preferences are identified and integrated in the patient's plan of care  Description  Interventions:  - What would you like us to know as we care for you?  I fell at work  - Provide timely, complete, and accur injury  Description  INTERVENTIONS:  - Assess pt frequently for physical needs  - Identify cognitive and physical deficits and behaviors that affect risk of falls.   - [place] fall precautions as indicated by assessment.  - Educate pt/family on patient sa discontinued this morning. Pt was able to ambulate x 1 assist with walker for short distances. Pt on tele, hemovac also in place.  Plan to d/c to rehab pending insurance approval.

## 2019-04-19 NOTE — PLAN OF CARE
Problem: PAIN - ADULT  Goal: Verbalizes/displays adequate comfort level or patient's stated pain goal  Description  INTERVENTIONS:  - Encourage pt to monitor pain and request assistance  - Assess pain using appropriate pain scale  - Administer analgesics patient's ability to be responsible for managing their own health  - Refer to Case Management Department for coordinating discharge planning if the patient needs post-hospital services based on physician/LIP order or complex needs related to functional sta

## 2019-04-19 NOTE — CM/SW NOTE
CM received a call from Dr. Simon Langley and the pt. Would prefer to go to [facility] for rehab. Per Dr. Simon Langley he spoke with Iman Walker liaison with [facility]. Referral sent and DON screen requested from [facility] Keren [facility].       Megan's comp  info given to [facility]

## 2019-04-19 NOTE — OCCUPATIONAL THERAPY NOTE
OCCUPATIONAL THERAPY TREATMENT NOTE - INPATIENT    Room Number: 431/431-A         Presenting Problem: R femur fx     Problem List  Principal Problem:    Closed displaced transverse fracture of shaft of right femur, initial encounter ([facility] [address]  Active Problems: Restriction: R lower extremity        R Lower Extremity: Toe Touch Weight Bearing  L Lower Extremity: Toe Touch Weight Bearing    PAIN ASSESSMENT  Rating: Unable to rate  Location: right hip  Management Techniques:  Activity promotion     ACTIVITY TOLERANCE self care task at sink level with SBA   Comment: ongoing    Patient will maintain WB restriction throughout activity with ind  Comment: ongoing         Goals  on:   Frequency: 3x/wk

## 2019-04-19 NOTE — PROGRESS NOTES
[facility]    Progress Note    Yolis Gill Patient Status:  Inpatient    1950 MRN [id number]   Location [facility] 4W/SW/SE Attending Alexandra Baker MD   Hosp Day # 2 PCP Tom Maloney MD     Date of Admission:  2019 Intravenous Q2H PRN   Or      HYDROmorphone HCl (DILAUDID) 1 MG/ML injection 0.4 mg 0.4 mg Intravenous Q2H PRN   Or      HYDROmorphone HCl (DILAUDID) 1 MG/ML injection 0.8 mg 0.8 mg Intravenous Q2H PRN   ondansetron HCl (ZOFRAN) injection 4 mg 4 mg Justin Barnes (36.8 °C) (Oral)   Resp 16   Ht 66\"   Wt 198 lb 6.4 oz   SpO2 96%   BMI 32.02 kg/m²   He is alert, oriented, and appropriate throughout the examination and in no apparent distress per  His vital signs are as enumerated above.   No abnormal posturing of the proofread for content only. Typographical errors may remain.       Carlos Carey MD  4/19/2019

## 2019-04-20 PROBLEM — M25.551 PAIN OF RIGHT HIP JOINT: Status: ACTIVE | Noted: 2019-03-25

## 2019-04-20 PROCEDURE — 99232 SBSQ HOSP IP/OBS MODERATE 35: CPT | Performed by: INTERNAL MEDICINE

## 2019-04-20 RX ORDER — HEPARIN SODIUM 5000 [USP'U]/ML
5000 INJECTION, SOLUTION INTRAVENOUS; SUBCUTANEOUS EVERY 12 HOURS SCHEDULED
Status: DISCONTINUED | OUTPATIENT
Start: 2019-04-21 | End: 2019-04-21

## 2019-04-20 NOTE — PHYSICAL THERAPY NOTE
PHYSICAL THERAPY HIP TREATMENT NOTE - INPATIENT    Room Number: 431/431-A            Presenting Problem: s/p fall sustaining RT transverse femur fx-> IM nailing POD #3    Problem List  Principal Problem:    Closed displaced transverse fracture of shaft of 149/80  BP Location: Right arm  BP Method: Automatic  Patient Position: Sitting    O2 WALK  SPO2 on Room Air at Rest: 99               AM-PAC '6-Clicks' INPATIENT SHORT FORM - BASIC MOBILITY  How much difficulty does the patient currently have. ..  -   Turn will negotiate 2 stairs/one curb w/ assistive device and supervision   Goal #4   Current Status NT   Goal #5 Patient to demonstrate independence with home activity/exercise instructions provided to patient in preparation for discharge.    Goal #5   Current

## 2019-04-20 NOTE — CM/SW NOTE
Pt's wife inquired about using pt's medicare to cover rehab, while awaiting WC for approval.     SW stated that pt's current hospitalization is being reviewed/billed by , not medicare.  SW stated that this will need to be follow up w/ UR to see if this is

## 2019-04-20 NOTE — PLAN OF CARE
Problem: Patient Centered Care  Goal: Patient preferences are identified and integrated in the patient's plan of care  Description  Interventions:  - What would you like us to know as we care for you?  I fell at work  - Provide timely, complete, and accur injury  Description  INTERVENTIONS:  - Assess pt frequently for physical needs  - Identify cognitive and physical deficits and behaviors that affect risk of falls.   - [place] fall precautions as indicated by assessment.  - Educate pt/family on patient sa alva. PRN Norco for pain management.  Plan to d/c to [facility] pending insurance approval.

## 2019-04-20 NOTE — PLAN OF CARE
Problem: Patient Centered Care  Goal: Patient preferences are identified and integrated in the patient's plan of care  Description  Interventions:  - What would you like us to know as we care for you?  I fell at work  - Provide timely, complete, and accur injury  Description  INTERVENTIONS:  - Assess pt frequently for physical needs  - Identify cognitive and physical deficits and behaviors that affect risk of falls.   - [place] fall precautions as indicated by assessment.  - Educate pt/family on patient sa has denied pain medication. Patient is a stand and pivot to the chair, toe touch weight bearing. Patient has been tolerating diet well. Patient has been anxious at times. Patient is voiding. VS stable. Patient has call light within reach.  Bed alarm in plac

## 2019-04-20 NOTE — PROGRESS NOTES
Postop R thigh pain    R thigh: Mild swelling  Dsg's c/d/i  No calf ttp  NVID    A: s/p IMN R femur fx POD 3    P:  1. Cont PT/Rehab, Protected WB  2. Cont DVT proph  3.  Dispo planning: Likely d/c to Rehab in 2-3 days

## 2019-04-20 NOTE — PROGRESS NOTES
[facility]    Progress Note    Phuong Belcher Patient Status:  Inpatient    1950 MRN [id number]   Location [facility] 4W/SW/SE Attending Mariah Silva MD   Hosp Day # 3 PCP Timi Parekh MD        Subjective:     Salbador Moeller Hx bone fusion T12 - L1. Recent CT myelogram: hardware intact. Chronic  R foot weakness. Follow ortho.  PT/OT     Hypercholesterolemia     HTN: stable, cont metoprolol     GI prophylaxis: pepcid  DVT prophylaxis xarelto  Code status: Full  Disposition: soci

## 2019-04-20 NOTE — PLAN OF CARE
Patient site is [facility]. Dressing was changed 2 today, completely saturated. Dr. Vale Chu was on call for Dr. Mariposa Ruelas. Notified. Per Dr. Vale Chu, d/c the xarelto and start the patient on heparin 5000 units q 12 hrs, starting tomorrow evening.

## 2019-04-21 PROCEDURE — 99232 SBSQ HOSP IP/OBS MODERATE 35: CPT | Performed by: INTERNAL MEDICINE

## 2019-04-21 NOTE — CONSULTS
[facility]    PATIENT'S NAME: Chrissy Tran   ATTENDING PHYSICIAN: Laron Gardner MD   CONSULTING PHYSICIAN: Yony Butterfield.  Edna Lux MD   PATIENT ACCOUNT#:   [id number]    LOCATION:  [address] [place] [address] #:   [id number]       DATE OF BIRTH:

## 2019-04-21 NOTE — PLAN OF CARE
Problem: Patient Centered Care  Goal: Patient preferences are identified and integrated in the patient's plan of care  Description  Interventions:  - What would you like us to know as we care for you?  I fell at work  - Provide timely, complete, and accur injury  Description  INTERVENTIONS:  - Assess pt frequently for physical needs  - Identify cognitive and physical deficits and behaviors that affect risk of falls.   - [place] fall precautions as indicated by assessment.  - Educate pt/family on patient sa right thigh c/d/I. Pt is a high fall risk. Bed alarm on, call light in reach,  siderails up x2. Discharge planning in progress, pt will go to [facility] for rehab.

## 2019-04-21 NOTE — PROGRESS NOTES
[facility]    Progress Note    Briseida Mccartney Patient Status:  Inpatient    1950 MRN [id number]   Location [facility] 4W/SW/SE Attending Dimas He MD   Hosp Day # 4 PCP Jennifer Escoto MD        Subjective:     Carolee Perez Cont prolia injections     Hx bone fusion T12 - L1. Recent CT myelogram: hardware intact. Chronic  R foot weakness. Follow ortho.  PT/OT     Hypercholesterolemia     HTN: stable, cont metoprolol     GI prophylaxis: pepcid  DVT prophylaxis xarelto  Code stat

## 2019-04-21 NOTE — PROGRESS NOTES
Feeling better today    R thigh: mild swelling  Dsg's c/d/i  No calf ttp  NV stable    A: s/p IMN R femur fx POD4    P:  1. RLE TTWB, PT/Rehab  2. Resume Xarelto in AM  3.   Dispo planning

## 2019-04-22 RX ORDER — FUROSEMIDE 40 MG/1
40 TABLET ORAL DAILY
Status: DISCONTINUED | OUTPATIENT
Start: 2019-04-22 | End: 2019-04-24

## 2019-04-22 NOTE — PHYSICAL THERAPY NOTE
PHYSICAL THERAPY TREATMENT NOTE - INPATIENT     Room Number: 408/408-A       Presenting Problem: s/p fall sustaining RT transverse femur fx-> IM nailing POD #3    Problem List  Principal Problem:    Closed displaced transverse fracture of shaft of right fe mobility; Body mechanics; Endurance; Patient education;Gait training;Strengthening;Transfer training;Balance training    SUBJECTIVE  Pt was agreeable to therapy session.      OBJECTIVE  Precautions: Limb alert - right    WEIGHT BEARING RESTRICTION  Weight Bear pumps  Glut sets  LAQ     Position Sitting       Patient End of Session: Up in chair;Needs met;Call light within reach;RN aware of session/findings; All patient questions and concerns addressed; Family present    CURRENT GOALS     Goals to be met by: 2 wks

## 2019-04-22 NOTE — CM/SW NOTE
FLORENTIN left liaison Rose Downer a voice mail at 9:15 am  on 4.22.19 checking on admission status at [facility] will continue to follow up. ADDENDUM 12:47 PM 4.22.19.     Patient has not called the adjustor back and is refusing to talk to her today, without t

## 2019-04-22 NOTE — OCCUPATIONAL THERAPY NOTE
OCCUPATIONAL THERAPY TREATMENT NOTE - INPATIENT    Room Number: 408/408-A         Presenting Problem: R femur fx     Problem List  Principal Problem:    Closed displaced transverse fracture of shaft of right femur, initial encounter ([facility] [address]  Active Problems: education    SUBJECTIVE  \"So let me talk about this. \" (re: all functional activities)    OBJECTIVE  Precautions: Limb alert - right    WEIGHT BEARING RESTRICTION  Weight Bearing Restriction: R lower extremity        R Lower Extremity: Toe Touch Weight Be Goals:      Patient will complete LE dressing with SBA  Comment: NT had already been to bathroom, focus was on transfer skills     Patient will complete functional t/f with SBA  Comment: Progressing-mod.  A x1-2 w/RW    Patient will complete self care task

## 2019-04-22 NOTE — PROGRESS NOTES
[facility] [place] [facility]    Progress Note    Chuckramiro Martins Patient Status:  Inpatient    1950 MRN [id number]   Location [facility] 4W/SW/SE Attending Jocelyne Ramirez MD   Hosp Day # 5 PCP Fercho Kerr MD        Subjective:     Bridgette Thompson Hx bone fusion T12 - L1. Recent CT myelogram: hardware intact. Chronic  R foot weakness. Follow ortho.  PT/OT     Hypercholesterolemia     HTN: stable, cont metoprolol  MONITOR     GI prophylaxis: pepcid  DVT prophylaxis xarelto  Code status: Full  Disposit

## 2019-04-22 NOTE — CM/SW NOTE
FLORENTIN spoke with Dr. Ebony Roe via telephone regarding the pt's case. Dr. Ebony Roe spoke with the pt. To explain to the pt. He needs to speak with the workmans comp  to start the [facility] claim. FLORENTIN met with the pt.  Who stated he contacted [facility] and gave them the in

## 2019-04-22 NOTE — PROGRESS NOTES
[facility]    Progress Note    Smooth Lynnwright Patient Status:  Inpatient    1950 MRN [id number]   Location [facility] 4W/SW/SE Attending Chuck Trevino MD   Hosp Day # 5 PCP Keeley Bruno MD     Date of Admission:  2019 Intravenous Q2H PRN   Or      HYDROmorphone HCl (DILAUDID) 1 MG/ML injection 0.4 mg 0.4 mg Intravenous Q2H PRN   Or      HYDROmorphone HCl (DILAUDID) 1 MG/ML injection 0.8 mg 0.8 mg Intravenous Q2H PRN   ondansetron HCl (ZOFRAN) injection 4 mg 4 mg Axel Quill (36.8 °C) (Oral)   Resp 18   Ht 66\"   Wt 198 lb 6.4 oz   SpO2 95%   BMI 32.02 kg/m²   He is alert, oriented, and appropriate throughout the examination and in no apparent distress. Vital signs are as enumerated above.   No abnormal posturing of the right

## 2019-04-23 RX ORDER — FAMOTIDINE 20 MG/1
20 TABLET ORAL DAILY
Refills: 0 | Status: SHIPPED | COMMUNITY
Start: 2019-04-23 | End: 2021-04-12 | Stop reason: ALTCHOICE

## 2019-04-23 RX ORDER — FUROSEMIDE 40 MG/1
40 TABLET ORAL DAILY
Refills: 0 | Status: SHIPPED | COMMUNITY
Start: 2019-04-23 | End: 2019-06-06

## 2019-04-23 RX ORDER — POLYETHYLENE GLYCOL 3350 17 G/17G
17 POWDER, FOR SOLUTION ORAL DAILY
Refills: 0 | Status: SHIPPED | COMMUNITY
Start: 2019-04-23

## 2019-04-23 RX ORDER — PSEUDOEPHEDRINE HCL 30 MG
100 TABLET ORAL 2 TIMES DAILY
Refills: 0 | Status: SHIPPED | COMMUNITY
Start: 2019-04-23

## 2019-04-23 RX ORDER — HYDROCODONE BITARTRATE AND ACETAMINOPHEN 7.5; 325 MG/1; MG/1
1 TABLET ORAL EVERY 4 HOURS PRN
Refills: 0 | Status: SHIPPED | COMMUNITY
Start: 2019-04-23 | End: 2019-04-25

## 2019-04-23 RX ORDER — POLYVINYL ALCOHOL 14 MG/ML
1 SOLUTION/ DROPS OPHTHALMIC EVERY 4 HOURS PRN
Refills: 0 | Status: SHIPPED | COMMUNITY
Start: 2019-04-23 | End: 2021-10-22

## 2019-04-23 RX ORDER — BISACODYL 10 MG
10 SUPPOSITORY, RECTAL RECTAL
Refills: 0 | Status: SHIPPED | COMMUNITY
Start: 2019-04-23 | End: 2019-06-14

## 2019-04-23 NOTE — PROGRESS NOTES
[facility]    Progress Note    Reggie Chavezn Patient Status:  Inpatient    1950 MRN [id number]   Location [facility] 4W/SW/SE Attending Virgilio Escobar MD   Hosp Day # 6 PCP Mariel Zheng MD        Subjective:     Eve Villeda Osteoporosis   Cont prolia injections     Hx bone fusion T12 - L1. Recent CT myelogram: hardware intact. Chronic  R foot weakness. Follow ortho.  PT/OT     HTN: stable/low, stopped metoprolol     GI prophylaxis: pepcid  DVT prophylaxis xarelto  Code status:

## 2019-04-23 NOTE — CM/SW NOTE
Per patient's preference, records sent to [facility] rehab this morning. FLORENTIN left the voice mail for Geraldine,his adjustor [phone], to inform her of preferred facility change for rehab. FLORENTIN will continue to follow up.     ADDENDUM 4.23.19 11:39 AM    FLORENTIN evans

## 2019-04-23 NOTE — PHYSICAL THERAPY NOTE
PHYSICAL THERAPY TREATMENT NOTE - INPATIENT     Room Number: 408/408-A       Presenting Problem: s/p fall sustaining RT transverse femur fx-> IM nailing POD #3    Problem List  Principal Problem:    Closed displaced transverse fracture of shaft of right fe Standing: Poor +  Dynamic Standing: Poor    ACTIVITY TOLERANCE                         O2 WALK                  AM-PAC '6-Clicks' INPATIENT SHORT FORM - BASIC MOBILITY  How much difficulty does the patient currently have. ..  -   Turning over in bed (includ assistance level: minimum assistance   Goal #3   Current Status 3 ft with RW with difficulty with Mod A   Goal #4 Patient will negotiate 2 stairs/one curb w/ assistive device and supervision   Goal #4   Current Status NT   Goal #5 Patient to demonstrate in

## 2019-04-23 NOTE — PLAN OF CARE
Problem: Patient Centered Care  Goal: Patient preferences are identified and integrated in the patient's plan of care  Description  Interventions:  - What would you like us to know as we care for you?  I fell at work  - Provide timely, complete, and accur injury  Description  INTERVENTIONS:  - Assess pt frequently for physical needs  - Identify cognitive and physical deficits and behaviors that affect risk of falls.   - [place] fall precautions as indicated by assessment.  - Educate pt/family on patient sa Surgical dressing intact. Patient is voiding freely. Safety precautions in place. Bed alarm activated; call light within reach. Will continue to monitor.

## 2019-04-23 NOTE — PROGRESS NOTES
[place] [facility]    Progress Note    Chang Iniguez Patient Status:  Inpatient    1950 MRN [id number]   Location [facility] 4W/SW/SE Attending Duran Alicea MD   Hosp Day # 6 PCP Paula Woodruff MD     Date of Admission:  2019 PRN   Or      HYDROmorphone HCl (DILAUDID) 1 MG/ML injection 0.4 mg 0.4 mg Intravenous Q2H PRN   Or      HYDROmorphone HCl (DILAUDID) 1 MG/ML injection 0.8 mg 0.8 mg Intravenous Q2H PRN   ondansetron HCl (ZOFRAN) injection 4 mg 4 mg Intravenous Q6H PRN   f Resp 18   Ht 66\"   Wt 198 lb 6.4 oz   SpO2 93%   BMI 32.02 kg/m²   He is alert, oriented, and appropriate throughout the examination and in no apparent distress. Vital signs are as enumerated above. No abnormal posturing of the right lower extremity.

## 2019-04-24 VITALS
OXYGEN SATURATION: 97 % | HEIGHT: 66 IN | HEART RATE: 110 BPM | RESPIRATION RATE: 18 BRPM | DIASTOLIC BLOOD PRESSURE: 73 MMHG | BODY MASS INDEX: 31.88 KG/M2 | SYSTOLIC BLOOD PRESSURE: 101 MMHG | WEIGHT: 198.38 LBS | TEMPERATURE: 98 F

## 2019-04-24 NOTE — PHYSICAL THERAPY NOTE
PHYSICAL THERAPY TREATMENT NOTE - INPATIENT     Room Number: 408/408-A       Presenting Problem: s/p fall sustaining RT transverse femur fx-> IM nailing POD #3    Problem List  Principal Problem:    Closed displaced transverse fracture of shaft of right fe )    PAIN ASSESSMENT   Rating: 3  Location: rt hip  Management Techniques:  Activity promotion;Repositioning    BALANCE                                                                                                                     Static Sitting: Good Current Status NT received in the chair   Goal #2 Patient is able to demonstrate transfers Sit to/from Stand at assistance level: minimum assistance with walker - rolling      Goal #2  Current Status Mod A w/ cues using RW.     Goal #3 Patient is able to

## 2019-04-24 NOTE — CM/SW NOTE
KRISHNA left a voice mail for the adjustor Екатерина checking on  claim status update. Per Naila Trujillo at [facility], they can accept under Medicare benefits if [facility] claim gets rejected. KRISHNA will continue to follow up.       ADDENDUM 4.24.19 3:57 PM    Krishna has not rec

## 2019-04-24 NOTE — PLAN OF CARE
Problem: Patient Centered Care  Goal: Patient preferences are identified and integrated in the patient's plan of care  Description  Interventions:  - What would you like us to know as we care for you?  I fell at work  - Provide timely, complete, and accur injury  Description  INTERVENTIONS:  - Assess pt frequently for physical needs  - Identify cognitive and physical deficits and behaviors that affect risk of falls.   - [place] fall precautions as indicated by assessment.  - Educate pt/family on patient sa dressing intact. PRN norco for pain control. Patient is voiding freely. Safety precautions maintained; bed alarm activated. Call light within reach; will continue to monitor.

## 2019-04-24 NOTE — OCCUPATIONAL THERAPY NOTE
OCCUPATIONAL THERAPY TREATMENT NOTE - INPATIENT    Room Number: 408/408-A         Presenting Problem: R femur fx     Problem List  Principal Problem:    Closed displaced transverse fracture of shaft of right femur, initial encounter ([facility] [address]  Active Problems: Recommendations: Reacher;Sock aid;Grab bars; Shower chair    PLAN  OT Treatment Plan: Balance activities; ADL training;Functional transfer training; Endurance training    SUBJECTIVE  Pt seen bedside and agreeable for OT session    OBJECTIVE  Precautions: Limb instruction in [facility] AE    Education Provided: role of OT, limited WB RLE< standing endurance, safety with transfers with WB restrictions, LE AE PRN  Patient End of Session: Up in chair;Needs met;Call light within reach; Alarm set; Family present    OT Goals:

## 2019-04-26 ENCOUNTER — SNF ADMIT/H&P (OUTPATIENT)
Dept: INTERNAL MEDICINE CLINIC | Facility: SKILLED NURSING FACILITY | Age: 69
End: 2019-04-26

## 2019-04-26 DIAGNOSIS — Z09 FOLLOW UP: ICD-10-CM

## 2019-04-26 DIAGNOSIS — Z51.89 ENCOUNTER FOR MEDICATION ADJUSTMENT: ICD-10-CM

## 2019-04-26 DIAGNOSIS — Z71.89 ENCOUNTER FOR MEDICATION REVIEW AND COUNSELING: ICD-10-CM

## 2019-04-26 DIAGNOSIS — R52 PAIN: ICD-10-CM

## 2019-04-26 PROCEDURE — 99310 SBSQ NF CARE HIGH MDM 45: CPT | Performed by: NURSE PRACTITIONER

## 2019-04-26 NOTE — PROGRESS NOTES
Jaun Puckett  : 1950  Age 70year old  male patient is admitted to [facility] for ANURAG. Chief complaint: Initial APRN Assessment/Follow up S/P Right Femur reduction with intramedullary nailing.     HPI  Patient traumatic injury to L3 spinal surgery with metal     Family History   Problem Relation Age of Onset   • Cancer Mother         breast   • Cancer Sister         colon   • Heart Disorder Brother    • Other (Other) Other         osteoarthritis, osteoporosis furosemide to 20mg daily from 40mg daily. RLE edema resolving post surgery. 1.Fall 4/16 with Right Closed displaced transverse femur fracture   -4/17/19 S/p R femur reduction w/ intramedullary nailing by Dr. Nohemi Covarrubias.   -PT/OT  - NWB  -Xarelto for DVT pr

## 2019-04-27 NOTE — DISCHARGE SUMMARY
[facility]  Discharge Summary    Gregoria Juan Patient Status:  Inpatient    1950 MRN [id number]   Location [facility] 4W/SW/SE Attending No att. providers found   Hosp Day # 7 PCP López Peña MD     Date of Admission: 2019 suppository (10 mg total) rectally daily as needed., Historical, R-0    docusate sodium 100 MG Oral Cap  Take 100 mg by mouth 2 (two) times daily. , Historical, R-0    famoTIDine 20 MG Oral Tab  Take 1 tablet (20 mg total) by mouth 2 (two) times daily. , His

## 2019-05-02 ENCOUNTER — SNF VISIT (OUTPATIENT)
Dept: INTERNAL MEDICINE CLINIC | Facility: SKILLED NURSING FACILITY | Age: 69
End: 2019-05-02

## 2019-05-02 DIAGNOSIS — S72.91XD CLOSED FRACTURE OF RIGHT FEMUR WITH ROUTINE HEALING, UNSPECIFIED FRACTURE MORPHOLOGY, UNSPECIFIED PORTION OF FEMUR, SUBSEQUENT ENCOUNTER: ICD-10-CM

## 2019-05-02 DIAGNOSIS — Z74.09 IMPAIRED MOBILITY: ICD-10-CM

## 2019-05-02 DIAGNOSIS — Z09 FOLLOW UP: ICD-10-CM

## 2019-05-02 PROCEDURE — 99308 SBSQ NF CARE LOW MDM 20: CPT | Performed by: CLINICAL NURSE SPECIALIST

## 2019-05-02 NOTE — PROGRESS NOTES
Farzana Francisco : 1950 Age 70year old male patient is admitted to [facility] for ANURAG. Chief complaint: Follow up S/P Right Femur reduction with intramedullary nailing.      HPI   Patient is a 77-year-old male wi Former Smoker   Packs/day: 1.00   Years: 20.00   Pack years: 20   Types: Cigarettes   Quit date: 1996   Years since quittin.3   Smokeless tobacco: Never Used   Alcohol use: No   Alcohol/week: 0.0 oz   Drug use: No   ALLERGIES:   Sulfa Antibiotics 2.Constipation   -CPM bisacodyl PRN, MOM PRN, colace BID, and Miralax daily. 3.Osteoporosis   -CPM prolia injections       4. Hx bone fusion T12 - L1.   -Recent CT myelogram: hardware intact.   - PT/OT       5. Hypercholesterolemia   -CPM Niacin

## 2019-05-03 ENCOUNTER — SNF VISIT (OUTPATIENT)
Dept: INTERNAL MEDICINE CLINIC | Facility: SKILLED NURSING FACILITY | Age: 69
End: 2019-05-03

## 2019-05-03 DIAGNOSIS — R82.998 DARK URINE: ICD-10-CM

## 2019-05-03 DIAGNOSIS — Z09 FOLLOW UP: ICD-10-CM

## 2019-05-03 DIAGNOSIS — Z71.89 ENCOUNTER FOR MEDICATION REVIEW AND COUNSELING: ICD-10-CM

## 2019-05-03 PROCEDURE — 99309 SBSQ NF CARE MODERATE MDM 30: CPT | Performed by: NURSE PRACTITIONER

## 2019-05-04 NOTE — PROGRESS NOTES
Jaun Lavern : 1950 Age 70year old male patient is admitted to [facility] for ANURAG. Chief complaint: Follow up S/P Right Femur reduction with intramedullary nailing and c/o concentrated urine.      KHANH Lou Problem Relation Age of Onset   • Cancer Mother   breast   • Cancer Sister   colon   • Heart Disorder Brother   • Other (Other) Other   osteoarthritis, osteoporosis   Social History   Tobacco Use   Smoking status: Former Smoker   Packs/day: 1.00   Years: 2 Right Closed displaced transverse femur fracture   -4/17/19 S/p R femur reduction w/ intramedullary nailing by Dr. Stacie Caceres, f/u 5/14   -PT/OT   - NWB   -Xarelto for DVT prophylaxis   -norco 7.5 PRN for pain   CPM tylenol PRN   -Furosemide decreased to 20m

## 2019-05-07 ENCOUNTER — SNF VISIT (OUTPATIENT)
Dept: INTERNAL MEDICINE CLINIC | Facility: SKILLED NURSING FACILITY | Age: 69
End: 2019-05-07

## 2019-05-07 DIAGNOSIS — S72.321D CLOSED DISPLACED TRANSVERSE FRACTURE OF SHAFT OF RIGHT FEMUR WITH ROUTINE HEALING, SUBSEQUENT ENCOUNTER: ICD-10-CM

## 2019-05-07 DIAGNOSIS — Z74.09 IMPAIRED MOBILITY: ICD-10-CM

## 2019-05-07 DIAGNOSIS — E78.5 HYPERLIPIDEMIA, UNSPECIFIED HYPERLIPIDEMIA TYPE: ICD-10-CM

## 2019-05-07 DIAGNOSIS — Z09 FOLLOW UP: ICD-10-CM

## 2019-05-07 PROCEDURE — 99308 SBSQ NF CARE LOW MDM 20: CPT | Performed by: CLINICAL NURSE SPECIALIST

## 2019-05-09 NOTE — PROGRESS NOTES
Jaret Palacios : 1950 Age 70year old male patient is admitted to [facility] for ANURAG. Chief complaint: Follow up S/P Right Femur reduction with intramedullary nailing and c/o concentrated urine.    HPI   Patient i NAIL Right 4/17/2019   Performed by Hans Brito MD at [facility] MAIN OR   • [address]   hernia repair after spinal surgery   • [address]   traumatic injury to L3 spinal surgery with metal   Family History   Problem Re extremities. LROM on RLE. Psych: Normal mood and affect. Behavior and judgment normal.   DIAGNOSTICS/LABS:CBC/BMP 5/6   ASSESSMENT/PLAN:   Continue therapies. Flushing: will change niacin to flush free niacin. Monitor closely   1. Fall 4/16 with Right C

## 2019-06-04 ENCOUNTER — SNF VISIT (OUTPATIENT)
Dept: INTERNAL MEDICINE CLINIC | Facility: SKILLED NURSING FACILITY | Age: 69
End: 2019-06-04

## 2019-06-04 DIAGNOSIS — Z09 FOLLOW UP: ICD-10-CM

## 2019-06-04 DIAGNOSIS — Z98.890 S/P ORIF (OPEN REDUCTION INTERNAL FIXATION) FRACTURE: ICD-10-CM

## 2019-06-04 DIAGNOSIS — Z87.81 S/P ORIF (OPEN REDUCTION INTERNAL FIXATION) FRACTURE: ICD-10-CM

## 2019-06-04 DIAGNOSIS — R82.998 DARK URINE: ICD-10-CM

## 2019-06-04 PROCEDURE — 99308 SBSQ NF CARE LOW MDM 20: CPT | Performed by: CLINICAL NURSE SPECIALIST

## 2019-06-06 ENCOUNTER — SNF VISIT (OUTPATIENT)
Dept: INTERNAL MEDICINE CLINIC | Facility: SKILLED NURSING FACILITY | Age: 69
End: 2019-06-06

## 2019-06-06 DIAGNOSIS — Z02.9 DISCHARGE PLANNING ISSUES: ICD-10-CM

## 2019-06-06 DIAGNOSIS — Z09 FOLLOW UP: ICD-10-CM

## 2019-06-06 DIAGNOSIS — I10 ESSENTIAL HYPERTENSION: ICD-10-CM

## 2019-06-06 DIAGNOSIS — S72.321D CLOSED DISPLACED TRANSVERSE FRACTURE OF SHAFT OF RIGHT FEMUR WITH ROUTINE HEALING, SUBSEQUENT ENCOUNTER: ICD-10-CM

## 2019-06-06 DIAGNOSIS — R82.998 DARK URINE: ICD-10-CM

## 2019-06-06 PROCEDURE — 99310 SBSQ NF CARE HIGH MDM 45: CPT | Performed by: CLINICAL NURSE SPECIALIST

## 2019-06-06 NOTE — PROGRESS NOTES
Discharge Summary     Macy Mayen : 1950 Age 70year old male patient is admitted to [facility] for ANURAG.      Chief complaint: Discharge education, F/u Isabel/tea colored urine, Follow up S/P Right Femur reducti History:   Procedure Laterality Date   • COLONOSCOPY   • DENTAL SURGERY PROCEDURE   extensive tental surgery - implants and bridges   • FEMUR IM NAIL Right 4/17/2019   Areli Westfall MD at [facility] OR   • [address]   hernia repair warm and dry. No rashes, erythema, diaphoresis. Wound: R hip and R distal thigh incisions healing   Neuro:AOx3, follows commands, and able to move extremities. LROM on RLE. Psych: Normal mood and affect.  Behavior and judgment normal.DIAGNOSTICS/LABS: C

## 2019-06-07 NOTE — PROGRESS NOTES
[facility] : 1950 Age 70year old male [facility]   Chief complaint: Isabel/tea colored urine, Follow up S/P Right Femur reduction with intramedullary nailing  HPI  Patient is a 70 osteoporosis  Social History  Tobacco Use  Smoking status: Former Smoker  Packs/day: 1.00  Years: 20.00  Pack years: 20  Types: Cigarettes  Quit date: 1996  Years since quittin.3  Smokeless tobacco: Never Used  Alcohol use: No  Alcohol/week: 0.0 o BMP 6/5  1. Fall 4/16 with Right Closed displaced transverse femur fracture  -4/17/19 S/p R femur reduction w/ intramedullary nailing by Dr. Stacie Caceres, f/u 6/25  -PT/OT  -30% WB to RLE  -Xarelto for DVT prophylaxis  -norco 7.5 PRN for pain  CPM tylenol PRN

## 2019-06-11 PROBLEM — S72.321D: Status: ACTIVE | Noted: 2019-04-17

## 2019-07-27 PROCEDURE — 80053 COMPREHEN METABOLIC PANEL: CPT | Performed by: INTERNAL MEDICINE

## 2019-07-27 PROCEDURE — 80061 LIPID PANEL: CPT | Performed by: INTERNAL MEDICINE

## 2019-07-27 PROCEDURE — 84443 ASSAY THYROID STIM HORMONE: CPT | Performed by: INTERNAL MEDICINE

## 2019-07-27 PROCEDURE — 85025 COMPLETE CBC W/AUTO DIFF WBC: CPT | Performed by: INTERNAL MEDICINE

## 2019-07-27 PROCEDURE — 82306 VITAMIN D 25 HYDROXY: CPT | Performed by: INTERNAL MEDICINE

## 2019-10-25 PROCEDURE — 80048 BASIC METABOLIC PNL TOTAL CA: CPT | Performed by: INTERNAL MEDICINE

## 2020-01-27 ENCOUNTER — OFFICE VISIT (OUTPATIENT)
Dept: ENDOCRINOLOGY CLINIC | Facility: CLINIC | Age: 70
End: 2020-01-27
Payer: MEDICARE

## 2020-01-27 VITALS
BODY MASS INDEX: 28 KG/M2 | HEART RATE: 112 BPM | DIASTOLIC BLOOD PRESSURE: 89 MMHG | WEIGHT: 190 LBS | SYSTOLIC BLOOD PRESSURE: 141 MMHG

## 2020-01-27 DIAGNOSIS — M81.8 OTHER OSTEOPOROSIS WITHOUT CURRENT PATHOLOGICAL FRACTURE: Primary | ICD-10-CM

## 2020-01-27 PROCEDURE — 99203 OFFICE O/P NEW LOW 30 MIN: CPT | Performed by: INTERNAL MEDICINE

## 2020-01-27 NOTE — PROGRESS NOTES
Name: Garima Francois  Date: 1/27/2020    Referring Physician: No ref. provider found    Patient presents with:  Consult: Pt would like to establish care with Endocrinologist to help manage Osteoporosis. Pt states he broke right femur 4/16/19.        HISTORY Disp: 3 Bottle, Rfl: 3  •  aspirin 81 MG Oral Tab, Take 81 mg by mouth daily. , Disp: , Rfl:   •  docusate sodium 100 MG Oral Cap, Take 100 mg by mouth 2 (two) times daily. , Disp: , Rfl: 0  •  famoTIDine 20 MG Oral Tab, Take 1 tablet (20 mg total) by mouth • PONV (postoperative nausea and vomiting)        Surgical history:   Past Surgical History:   Procedure Laterality Date   • COLONOSCOPY     • DENTAL SURGERY PROCEDURE      extensive tental surgery - implants and bridges    • FEMUR IM NAIL Right 4/17/201

## 2020-01-28 ENCOUNTER — APPOINTMENT (OUTPATIENT)
Dept: LAB | Age: 70
End: 2020-01-28
Attending: INTERNAL MEDICINE
Payer: MEDICARE

## 2020-01-28 DIAGNOSIS — M81.8 OTHER OSTEOPOROSIS WITHOUT CURRENT PATHOLOGICAL FRACTURE: ICD-10-CM

## 2020-01-28 LAB — PTH-INTACT SERPL-MCNC: 31.1 PG/ML (ref 18.5–88)

## 2020-01-28 PROCEDURE — 82523 COLLAGEN CROSSLINKS: CPT

## 2020-01-28 PROCEDURE — 84080 ASSAY ALKALINE PHOSPHATASES: CPT

## 2020-01-28 PROCEDURE — 83970 ASSAY OF PARATHORMONE: CPT

## 2020-01-28 PROCEDURE — 36415 COLL VENOUS BLD VENIPUNCTURE: CPT

## 2020-01-30 LAB — BONE SPECIFIC ALKALINE PHOSPHATASE: 9.4 UG/L

## 2020-01-31 ENCOUNTER — TELEPHONE (OUTPATIENT)
Dept: ENDOCRINOLOGY CLINIC | Facility: CLINIC | Age: 70
End: 2020-01-31

## 2020-01-31 DIAGNOSIS — M81.8 OTHER OSTEOPOROSIS WITHOUT CURRENT PATHOLOGICAL FRACTURE: Primary | ICD-10-CM

## 2020-01-31 LAB — C-TELOPEPTIDE, BETA-CROSS-LINK: 237 PG/ML

## 2020-01-31 NOTE — TELEPHONE ENCOUNTER
Please call patient - his rate of bone loss is currently at goal.  Therefore ok to continue drug holiday and hold medication at this time. Repeat Bone specific alk phos in 6 months to monitor. Thanks.

## 2020-02-05 NOTE — TELEPHONE ENCOUNTER
Pt called back and returned phone call. Result and recommendation discussed as outlined by Dr. Bard Ndiaye.  Pt made a 6 month follow up and requested to have a copy of repeat blood work to be mailed to his home address.   States he will do blood work prior to [facility]

## 2020-07-21 ENCOUNTER — APPOINTMENT (OUTPATIENT)
Dept: LAB | Age: 70
End: 2020-07-21
Attending: INTERNAL MEDICINE
Payer: MEDICARE

## 2020-07-21 DIAGNOSIS — M81.8 OTHER OSTEOPOROSIS WITHOUT CURRENT PATHOLOGICAL FRACTURE: ICD-10-CM

## 2020-07-21 PROCEDURE — 36415 COLL VENOUS BLD VENIPUNCTURE: CPT

## 2020-07-21 PROCEDURE — 84080 ASSAY ALKALINE PHOSPHATASES: CPT

## 2020-07-23 LAB — BONE SPECIFIC ALKALINE PHOSPHATASE: 10.1 UG/L

## 2020-07-29 ENCOUNTER — OFFICE VISIT (OUTPATIENT)
Dept: ENDOCRINOLOGY CLINIC | Facility: CLINIC | Age: 70
End: 2020-07-29
Payer: MEDICARE

## 2020-07-29 VITALS
WEIGHT: 184.38 LBS | SYSTOLIC BLOOD PRESSURE: 129 MMHG | HEART RATE: 90 BPM | BODY MASS INDEX: 27 KG/M2 | DIASTOLIC BLOOD PRESSURE: 86 MMHG

## 2020-07-29 DIAGNOSIS — M81.8 OTHER OSTEOPOROSIS WITHOUT CURRENT PATHOLOGICAL FRACTURE: Primary | ICD-10-CM

## 2020-07-29 PROCEDURE — G0463 HOSPITAL OUTPT CLINIC VISIT: HCPCS | Performed by: INTERNAL MEDICINE

## 2020-07-29 PROCEDURE — 99213 OFFICE O/P EST LOW 20 MIN: CPT | Performed by: INTERNAL MEDICINE

## 2020-07-29 NOTE — PROGRESS NOTES
Name: Brittney Scott  Date: 7/29/2020    Referring Physician: No ref. provider found    Patient presents with: Follow - Up: Pt here to review recent labs.        HISTORY OF PRESENT ILLNESS   Brittney Scott is a 78year old male who presents for Patient pre Nare route daily. , Disp: 3 Bottle, Rfl: 3  •  aspirin 81 MG Oral Tab, Take 81 mg by mouth daily. , Disp: , Rfl:   •  docusate sodium 100 MG Oral Cap, Take 100 mg by mouth 2 (two) times daily. , Disp: , Rfl: 0  •  famoTIDine 20 MG Oral Tab, Take 20 mg by mout (postoperative nausea and vomiting)        Surgical history:   Past Surgical History:   Procedure Laterality Date   • COLONOSCOPY     • DENTAL SURGERY PROCEDURE      extensive tental surgery - implants and bridges    • FEMUR IM NAIL Right 4/17/2019    Perf

## 2020-08-07 ENCOUNTER — HOSPITAL ENCOUNTER (OUTPATIENT)
Dept: BONE DENSITY | Age: 70
Discharge: HOME | End: 2020-08-07
Attending: INTERNAL MEDICINE
Payer: MEDICARE

## 2020-08-07 DIAGNOSIS — M81.8 OTHER OSTEOPOROSIS WITHOUT CURRENT PATHOLOGICAL FRACTURE: ICD-10-CM

## 2020-08-07 PROCEDURE — 77080 DXA BONE DENSITY AXIAL: CPT | Performed by: INTERNAL MEDICINE

## 2020-09-14 PROCEDURE — 84443 ASSAY THYROID STIM HORMONE: CPT | Performed by: INTERNAL MEDICINE

## 2020-09-14 PROCEDURE — 85027 COMPLETE CBC AUTOMATED: CPT | Performed by: INTERNAL MEDICINE

## 2020-09-14 PROCEDURE — 82306 VITAMIN D 25 HYDROXY: CPT | Performed by: INTERNAL MEDICINE

## 2020-09-14 PROCEDURE — 80061 LIPID PANEL: CPT | Performed by: INTERNAL MEDICINE

## 2020-09-14 PROCEDURE — 80053 COMPREHEN METABOLIC PANEL: CPT | Performed by: INTERNAL MEDICINE

## 2021-03-12 DIAGNOSIS — Z23 NEED FOR VACCINATION: ICD-10-CM

## 2021-04-12 PROBLEM — M25.531 BILATERAL WRIST PAIN: Status: ACTIVE | Noted: 2021-04-12

## 2021-04-12 PROBLEM — M19.039 WRIST ARTHRITIS: Status: ACTIVE | Noted: 2021-04-12

## 2021-04-12 PROBLEM — M25.532 BILATERAL WRIST PAIN: Status: ACTIVE | Noted: 2021-04-12

## 2021-05-17 PROBLEM — M19.031 ARTHRITIS OF RIGHT WRIST: Status: ACTIVE | Noted: 2021-04-12

## 2021-05-17 PROBLEM — M19.032 ARTHRITIS OF WRIST, LEFT: Status: ACTIVE | Noted: 2021-05-17

## 2021-06-07 ENCOUNTER — OFFICE VISIT (OUTPATIENT)
Dept: AUDIOLOGY | Facility: CLINIC | Age: 71
End: 2021-06-07
Payer: MEDICARE

## 2021-06-07 DIAGNOSIS — H90.3 SENSORINEURAL HEARING LOSS, BILATERAL: Primary | ICD-10-CM

## 2021-06-07 PROCEDURE — 92567 TYMPANOMETRY: CPT | Performed by: AUDIOLOGIST

## 2021-06-07 PROCEDURE — 92557 COMPREHENSIVE HEARING TEST: CPT | Performed by: AUDIOLOGIST

## 2021-06-08 ENCOUNTER — TELEPHONE (OUTPATIENT)
Dept: AUDIOLOGY | Facility: CLINIC | Age: 71
End: 2021-06-08

## 2021-06-08 PROBLEM — H90.3 SENSORINEURAL HEARING LOSS, BILATERAL: Status: ACTIVE | Noted: 2021-06-08

## 2021-06-08 NOTE — TELEPHONE ENCOUNTER
Per pt wants to discuss hearing test yesterday. Per pt has wife with him and needs everything to be explained to her as well.  Please advise

## 2021-06-08 NOTE — PROGRESS NOTES
AUDIOGRAM     Alberto Cervantes was referred for testing by Laurita Bowen.   8/9/1950  [id number]        History  Mr. Ross Patient is here today for an audiological evaluation.   He brings with him binaural hearing aids ([facility]) obtained elsewhere volume, suggesting poor mobility of the TM and middle ear system in the left ear      Summary  HL bilaterally.       With this degree of hearing loss, patient would have difficulty hearing in most situations, and would not be able to hear most average conve

## 2021-06-08 NOTE — PATIENT INSTRUCTIONS
How Hearing Aids Can Help You     Losing your hearing can be frustrating. But hearing aids can help you hear what Adrianne Mujica been missing. Not everyone who has hearing loss needs hearing aids.  Hearing aids will most likely help you if your hearing loss:  · [employer] [facility] 4037. All rights reserved. This information is not intended as a substitute for professional medical care. Always follow your healthcare professional's instructions.

## 2021-10-22 PROBLEM — D64.9 ANEMIA, UNSPECIFIED: Status: ACTIVE | Noted: 2019-04-24

## 2021-10-22 PROBLEM — H91.93 UNSPECIFIED HEARING LOSS, BILATERAL: Status: ACTIVE | Noted: 2019-04-24

## 2021-10-22 PROBLEM — R29.6 REPEATED FALLS: Status: ACTIVE | Noted: 2019-04-24

## 2021-10-22 PROBLEM — M19.90 UNSPECIFIED OSTEOARTHRITIS, UNSPECIFIED SITE: Status: ACTIVE | Noted: 2019-04-24

## 2021-10-22 PROBLEM — I10 ESSENTIAL (PRIMARY) HYPERTENSION: Status: ACTIVE | Noted: 2019-04-24

## 2021-10-22 PROCEDURE — 82306 VITAMIN D 25 HYDROXY: CPT | Performed by: INTERNAL MEDICINE

## 2021-10-22 PROCEDURE — 80053 COMPREHEN METABOLIC PANEL: CPT | Performed by: INTERNAL MEDICINE

## 2021-10-22 PROCEDURE — 80061 LIPID PANEL: CPT | Performed by: INTERNAL MEDICINE

## 2021-10-22 PROCEDURE — 85025 COMPLETE CBC W/AUTO DIFF WBC: CPT | Performed by: INTERNAL MEDICINE

## 2021-10-22 PROCEDURE — 82550 ASSAY OF CK (CPK): CPT | Performed by: INTERNAL MEDICINE

## 2021-10-22 PROCEDURE — 84443 ASSAY THYROID STIM HORMONE: CPT | Performed by: INTERNAL MEDICINE

## 2022-10-27 NOTE — TELEPHONE ENCOUNTER
Spoke to patient's wife and reviewed all options for her. Time is an issue, so because we are booked for a month in advance, I relayed some outside sources that might help them in obtaining hearing aids. Patient was given a copy of his audiogram at his visit. Complex Repair And V-Y Plasty Text: The defect edges were debeveled with a #15 scalpel blade.  The primary defect was closed partially with a complex linear closure.  Given the location of the remaining defect, shape of the defect and the proximity to free margins a V-Y plasty was deemed most appropriate for complete closure of the defect.  Using a sterile surgical marker, an appropriate advancement flap was drawn incorporating the defect and placing the expected incisions within the relaxed skin tension lines where possible.    The area thus outlined was incised deep to adipose tissue with a #15 scalpel blade.  The skin margins were undermined to an appropriate distance in all directions utilizing iris scissors.

## 2022-10-28 PROCEDURE — 85025 COMPLETE CBC W/AUTO DIFF WBC: CPT | Performed by: NURSE PRACTITIONER

## 2022-10-28 PROCEDURE — 82306 VITAMIN D 25 HYDROXY: CPT | Performed by: NURSE PRACTITIONER

## 2022-10-28 PROCEDURE — 82550 ASSAY OF CK (CPK): CPT | Performed by: NURSE PRACTITIONER

## 2022-10-28 PROCEDURE — 80053 COMPREHEN METABOLIC PANEL: CPT | Performed by: NURSE PRACTITIONER

## 2022-10-28 PROCEDURE — 80061 LIPID PANEL: CPT | Performed by: NURSE PRACTITIONER

## 2022-10-28 PROCEDURE — 84403 ASSAY OF TOTAL TESTOSTERONE: CPT | Performed by: NURSE PRACTITIONER

## 2022-10-28 PROCEDURE — 84443 ASSAY THYROID STIM HORMONE: CPT | Performed by: NURSE PRACTITIONER

## 2023-02-14 ENCOUNTER — HOSPITAL ENCOUNTER (EMERGENCY)
Facility: HOSPITAL | Age: 73
Discharge: HOME | End: 2023-02-14
Attending: EMERGENCY MEDICINE
Payer: MEDICARE

## 2023-02-14 VITALS
TEMPERATURE: 97 F | HEIGHT: 68 IN | BODY MASS INDEX: 27.28 KG/M2 | DIASTOLIC BLOOD PRESSURE: 90 MMHG | WEIGHT: 180 LBS | RESPIRATION RATE: 20 BRPM | SYSTOLIC BLOOD PRESSURE: 146 MMHG | HEART RATE: 99 BPM | OXYGEN SATURATION: 96 %

## 2023-02-14 DIAGNOSIS — H60.502 ACUTE OTITIS EXTERNA OF LEFT EAR, UNSPECIFIED TYPE: Primary | ICD-10-CM

## 2023-02-14 PROCEDURE — 99283 EMERGENCY DEPT VISIT LOW MDM: CPT

## 2023-02-14 RX ORDER — CIPROFLOXACIN AND DEXAMETHASONE 3; 1 MG/ML; MG/ML
4 SUSPENSION/ DROPS AURICULAR (OTIC) 2 TIMES DAILY
Qty: 1 EACH | Refills: 0 | Status: SHIPPED | OUTPATIENT
Start: 2023-02-14 | End: 2023-02-21

## 2023-02-14 NOTE — ED QUICK NOTES
Patient safe to DC home per MD. Maxwell Gresham to dress self. DC teaching done, instructions reviewed with patient including when and how to follow up with healthcare providers and when to seek emergency care. The patient verbalizes understanding. Patient ambulatory with steady gait to exit.

## 2023-02-14 NOTE — ED INITIAL ASSESSMENT (HPI)
Pt c/o worsening pain with drainage coming from the left ear. Has appt. With Johanna on the 29th states he cannot wait.

## 2023-02-14 NOTE — DISCHARGE INSTRUCTIONS
Use antibiotic drops as prescribed. Take Tylenol as needed for pain. Follow-up with otolaryngology (ENT) as scheduled for reevaluation. Return to the emergency department if increasing pain, fever, or other new symptoms develop.

## 2023-02-28 ENCOUNTER — OFFICE VISIT (OUTPATIENT)
Dept: OTOLARYNGOLOGY | Facility: CLINIC | Age: 73
End: 2023-02-28

## 2023-02-28 VITALS — HEIGHT: 68 IN | BODY MASS INDEX: 27.28 KG/M2 | TEMPERATURE: 98 F | WEIGHT: 180 LBS

## 2023-02-28 DIAGNOSIS — H65.02 NON-RECURRENT ACUTE SEROUS OTITIS MEDIA OF LEFT EAR: Primary | ICD-10-CM

## 2023-02-28 DIAGNOSIS — J34.2 NASAL SEPTAL DEVIATION: ICD-10-CM

## 2023-02-28 PROCEDURE — 99213 OFFICE O/P EST LOW 20 MIN: CPT | Performed by: SPECIALIST

## 2023-02-28 PROCEDURE — 31231 NASAL ENDOSCOPY DX: CPT | Performed by: SPECIALIST

## 2023-02-28 RX ORDER — PREDNISONE 10 MG/1
10 TABLET ORAL DAILY
Qty: 3 TABLET | Refills: 0 | Status: SHIPPED | OUTPATIENT
Start: 2023-02-28

## 2023-02-28 RX ORDER — AMOXICILLIN AND CLAVULANATE POTASSIUM 875; 125 MG/1; MG/1
1 TABLET, FILM COATED ORAL EVERY 12 HOURS
Qty: 20 TABLET | Refills: 0 | Status: SHIPPED | OUTPATIENT
Start: 2023-02-28

## 2023-02-28 NOTE — PATIENT INSTRUCTIONS
No further otitis externa. There appears to be a left serous otitis media. You also have a severe left septal deviation. No further sinusitis on the day to your visit. I placed you on 10 days of Augmentin and 3 days of prednisone. Follow-up at the [facility] office in 3 weeks time, sooner if problems.

## 2023-03-07 PROBLEM — G89.29 CHRONIC PAIN OF BOTH KNEES: Status: ACTIVE | Noted: 2023-03-07

## 2023-03-07 PROBLEM — M23.91 INTERNAL DERANGEMENT OF BOTH KNEES: Status: ACTIVE | Noted: 2023-03-07

## 2023-03-07 PROBLEM — M23.92 INTERNAL DERANGEMENT OF BOTH KNEES: Status: ACTIVE | Noted: 2023-03-07

## 2023-03-07 PROBLEM — M25.561 CHRONIC PAIN OF BOTH KNEES: Status: ACTIVE | Noted: 2023-03-07

## 2023-03-07 PROBLEM — M17.0 PRIMARY OSTEOARTHRITIS OF BOTH KNEES: Status: ACTIVE | Noted: 2023-03-07

## 2023-03-07 PROBLEM — M25.562 CHRONIC PAIN OF BOTH KNEES: Status: ACTIVE | Noted: 2023-03-07

## 2023-03-22 ENCOUNTER — OFFICE VISIT (OUTPATIENT)
Dept: OTOLARYNGOLOGY | Facility: CLINIC | Age: 73
End: 2023-03-22

## 2023-03-22 VITALS — WEIGHT: 180 LBS | HEIGHT: 68 IN | BODY MASS INDEX: 27.28 KG/M2

## 2023-03-22 DIAGNOSIS — J34.3 NASAL TURBINATE HYPERTROPHY: ICD-10-CM

## 2023-03-22 DIAGNOSIS — H65.02 NON-RECURRENT ACUTE SEROUS OTITIS MEDIA OF LEFT EAR: Primary | ICD-10-CM

## 2023-03-22 DIAGNOSIS — J34.2 NASAL SEPTAL DEVIATION: ICD-10-CM

## 2023-03-22 PROCEDURE — 99213 OFFICE O/P EST LOW 20 MIN: CPT | Performed by: SPECIALIST

## 2023-03-22 RX ORDER — FLUTICASONE PROPIONATE 50 MCG
2 SPRAY, SUSPENSION (ML) NASAL DAILY
Qty: 16 G | Refills: 5 | Status: SHIPPED | OUTPATIENT
Start: 2023-03-22

## 2023-03-22 NOTE — PATIENT INSTRUCTIONS
You have a complete resolution of your left serous otitis media. Continue Flonase nasal spray for your turbinate congestion. Follow-up with any additional questions or problems.

## 2023-11-02 PROCEDURE — 80053 COMPREHEN METABOLIC PANEL: CPT

## 2023-11-02 PROCEDURE — 80061 LIPID PANEL: CPT

## 2023-11-02 PROCEDURE — 84443 ASSAY THYROID STIM HORMONE: CPT

## 2023-11-02 PROCEDURE — 84403 ASSAY OF TOTAL TESTOSTERONE: CPT

## 2023-11-02 PROCEDURE — 83036 HEMOGLOBIN GLYCOSYLATED A1C: CPT

## 2023-11-02 PROCEDURE — 82306 VITAMIN D 25 HYDROXY: CPT

## 2023-11-02 PROCEDURE — 85025 COMPLETE CBC W/AUTO DIFF WBC: CPT

## 2023-11-26 ENCOUNTER — OFFICE VISIT (OUTPATIENT)
Dept: FAMILY MEDICINE CLINIC | Facility: CLINIC | Age: 73
End: 2023-11-26
Payer: MEDICARE

## 2023-11-26 VITALS
WEIGHT: 183 LBS | RESPIRATION RATE: 18 BRPM | BODY MASS INDEX: 27.74 KG/M2 | TEMPERATURE: 100 F | SYSTOLIC BLOOD PRESSURE: 130 MMHG | HEART RATE: 119 BPM | DIASTOLIC BLOOD PRESSURE: 84 MMHG | OXYGEN SATURATION: 97 % | HEIGHT: 68 IN

## 2023-11-26 DIAGNOSIS — U07.1 COVID: Primary | ICD-10-CM

## 2023-11-26 PROCEDURE — 3075F SYST BP GE 130 - 139MM HG: CPT | Performed by: NURSE PRACTITIONER

## 2023-11-26 PROCEDURE — 99203 OFFICE O/P NEW LOW 30 MIN: CPT | Performed by: NURSE PRACTITIONER

## 2023-11-26 PROCEDURE — 3079F DIAST BP 80-89 MM HG: CPT | Performed by: NURSE PRACTITIONER

## 2023-11-26 PROCEDURE — 3008F BODY MASS INDEX DOCD: CPT | Performed by: NURSE PRACTITIONER

## 2023-12-27 ENCOUNTER — APPOINTMENT (OUTPATIENT)
Dept: CT IMAGING | Facility: HOSPITAL | Age: 73
End: 2023-12-27
Attending: EMERGENCY MEDICINE
Payer: MEDICARE

## 2023-12-27 ENCOUNTER — APPOINTMENT (OUTPATIENT)
Dept: GENERAL RADIOLOGY | Facility: HOSPITAL | Age: 73
End: 2023-12-27
Attending: EMERGENCY MEDICINE
Payer: MEDICARE

## 2023-12-27 ENCOUNTER — HOSPITAL ENCOUNTER (INPATIENT)
Facility: HOSPITAL | Age: 73
LOS: 7 days | Discharge: NURSING, REHAB OR LONG-TERM CARE | End: 2024-01-04
Attending: EMERGENCY MEDICINE | Admitting: INTERNAL MEDICINE
Payer: MEDICARE

## 2023-12-27 DIAGNOSIS — E86.0 DEHYDRATION: ICD-10-CM

## 2023-12-27 DIAGNOSIS — R55 SYNCOPE, NEAR: Primary | ICD-10-CM

## 2023-12-27 DIAGNOSIS — R56.9 SEIZURE-LIKE ACTIVITY (HCC): ICD-10-CM

## 2023-12-27 PROBLEM — E87.1 HYPONATREMIA: Status: ACTIVE | Noted: 2023-12-27

## 2023-12-27 LAB
ALBUMIN SERPL-MCNC: 4.3 G/DL (ref 3.2–4.8)
ALBUMIN/GLOB SERPL: 1.3 {RATIO} (ref 1–2)
ALP LIVER SERPL-CCNC: 60 U/L
ALT SERPL-CCNC: 33 U/L
ANION GAP SERPL CALC-SCNC: 7 MMOL/L (ref 0–18)
AST SERPL-CCNC: 39 U/L (ref ?–34)
ATRIAL RATE: 114 BPM
BASOPHILS # BLD AUTO: 0.04 X10(3) UL (ref 0–0.2)
BASOPHILS NFR BLD AUTO: 0.4 %
BILIRUB SERPL-MCNC: 0.8 MG/DL (ref 0.2–1.1)
BILIRUB UR QL: NEGATIVE
BUN BLD-MCNC: 16 MG/DL (ref 9–23)
BUN/CREAT SERPL: 15.4 (ref 10–20)
CALCIUM BLD-MCNC: 9 MG/DL (ref 8.7–10.4)
CHLORIDE SERPL-SCNC: 104 MMOL/L (ref 98–112)
CLARITY UR: CLEAR
CO2 SERPL-SCNC: 21 MMOL/L (ref 21–32)
CREAT BLD-MCNC: 1.04 MG/DL
D DIMER PPP FEU-MCNC: 0.94 UG/ML FEU (ref ?–0.74)
DEPRECATED RDW RBC AUTO: 40.7 FL (ref 35.1–46.3)
EGFRCR SERPLBLD CKD-EPI 2021: 75 ML/MIN/1.73M2 (ref 60–?)
EOSINOPHIL # BLD AUTO: 0.01 X10(3) UL (ref 0–0.7)
EOSINOPHIL NFR BLD AUTO: 0.1 %
ERYTHROCYTE [DISTWIDTH] IN BLOOD BY AUTOMATED COUNT: 12.8 % (ref 11–15)
FLUAV + FLUBV RNA SPEC NAA+PROBE: NEGATIVE
FLUAV + FLUBV RNA SPEC NAA+PROBE: NEGATIVE
GLOBULIN PLAS-MCNC: 3.2 G/DL (ref 2.8–4.4)
GLUCOSE BLD-MCNC: 188 MG/DL (ref 70–99)
GLUCOSE UR-MCNC: NORMAL MG/DL
HCT VFR BLD AUTO: 36.5 %
HGB BLD-MCNC: 12.7 G/DL
IMM GRANULOCYTES # BLD AUTO: 0.12 X10(3) UL (ref 0–1)
IMM GRANULOCYTES NFR BLD: 1.1 %
KETONES UR-MCNC: 20 MG/DL
L PNEUMO AG UR QL: NEGATIVE
LEUKOCYTE ESTERASE UR QL STRIP.AUTO: NEGATIVE
LYMPHOCYTES # BLD AUTO: 0.94 X10(3) UL (ref 1–4)
LYMPHOCYTES NFR BLD AUTO: 8.6 %
MCH RBC QN AUTO: 30 PG (ref 26–34)
MCHC RBC AUTO-ENTMCNC: 34.8 G/DL (ref 31–37)
MCV RBC AUTO: 86.3 FL
MONOCYTES # BLD AUTO: 1 X10(3) UL (ref 0.1–1)
MONOCYTES NFR BLD AUTO: 9.1 %
NEUTROPHILS # BLD AUTO: 8.87 X10 (3) UL (ref 1.5–7.7)
NEUTROPHILS # BLD AUTO: 8.87 X10(3) UL (ref 1.5–7.7)
NEUTROPHILS NFR BLD AUTO: 80.7 %
NITRITE UR QL STRIP.AUTO: NEGATIVE
OSMOLALITY SERPL CALC.SUM OF ELEC: 280 MOSM/KG (ref 275–295)
P AXIS: 34 DEGREES
P-R INTERVAL: 144 MS
PH UR: 5.5 [PH] (ref 5–8)
PLATELET # BLD AUTO: 253 10(3)UL (ref 150–450)
POTASSIUM SERPL-SCNC: 4.2 MMOL/L (ref 3.5–5.1)
PROT SERPL-MCNC: 7.5 G/DL (ref 5.7–8.2)
PROT UR-MCNC: 30 MG/DL
Q-T INTERVAL: 350 MS
QRS DURATION: 94 MS
QTC CALCULATION (BEZET): 482 MS
R AXIS: -54 DEGREES
RBC # BLD AUTO: 4.23 X10(6)UL
RSV RNA SPEC NAA+PROBE: NEGATIVE
SARS-COV-2 RNA RESP QL NAA+PROBE: NOT DETECTED
SODIUM SERPL-SCNC: 132 MMOL/L (ref 136–145)
SP GR UR STRIP: 1.01 (ref 1–1.03)
STREP PNEUMO ANTIGEN, URINE: NEGATIVE
T AXIS: 81 DEGREES
TROPONIN I SERPL HS-MCNC: 10 NG/L
UROBILINOGEN UR STRIP-ACNC: NORMAL
VENTRICULAR RATE: 114 BPM
WBC # BLD AUTO: 11 X10(3) UL (ref 4–11)

## 2023-12-27 PROCEDURE — 70450 CT HEAD/BRAIN W/O DYE: CPT | Performed by: EMERGENCY MEDICINE

## 2023-12-27 PROCEDURE — 72125 CT NECK SPINE W/O DYE: CPT | Performed by: EMERGENCY MEDICINE

## 2023-12-27 PROCEDURE — 71045 X-RAY EXAM CHEST 1 VIEW: CPT | Performed by: EMERGENCY MEDICINE

## 2023-12-27 PROCEDURE — 71260 CT THORAX DX C+: CPT | Performed by: EMERGENCY MEDICINE

## 2023-12-27 PROCEDURE — 99205 OFFICE O/P NEW HI 60 MIN: CPT | Performed by: INTERNAL MEDICINE

## 2023-12-27 RX ORDER — LORAZEPAM 1 MG/1
1 TABLET ORAL EVERY 6 HOURS PRN
Status: DISCONTINUED | OUTPATIENT
Start: 2023-12-27 | End: 2024-01-04

## 2023-12-27 RX ORDER — ROSUVASTATIN CALCIUM 10 MG/1
10 TABLET, COATED ORAL NIGHTLY
Status: DISCONTINUED | OUTPATIENT
Start: 2023-12-27 | End: 2024-01-04

## 2023-12-27 RX ORDER — AZITHROMYCIN 250 MG/1
500 TABLET, FILM COATED ORAL
Status: DISCONTINUED | OUTPATIENT
Start: 2023-12-27 | End: 2023-12-28

## 2023-12-27 RX ORDER — SODIUM CHLORIDE 450 MG/100ML
INJECTION, SOLUTION INTRAVENOUS CONTINUOUS
Status: DISCONTINUED | OUTPATIENT
Start: 2023-12-27 | End: 2023-12-30

## 2023-12-27 NOTE — CONSULTS
[facility]    Report of Consultation    Ronn Blank Patient Status:  Observation    1950 MRN [id number]   Location [facility] 1W Attending Oziel Sanders MD   Hosp Day # 0 PCP OZIEL SANDERS MD     Date of Admission:  2023    Reason for Consultation:   Abnormal chest imaging    History of Present Illness:   Patient is a 73-year-old male with past medical history significant for osteoporosis, hypercholesteremia who presents status post fall with generalized malaise.  With recent history of COVID-19 1 month prior.  Currently confused unable to answer questions appropriately.  Moving all extremities.  Unable to obtain 12 point review of system secondary patient's current clinical condition    Past Medical History  Past Medical History:   Diagnosis Date    Arthritis     Erectile dysfunction     Hearing impairment     [place] - bilateral hearing aids    Hypercholesterolemia     Osteoporosis     PONV (postoperative nausea and vomiting)        Past Surgical History  Past Surgical History:   Procedure Laterality Date    COLONOSCOPY      DENTAL SURGERY PROCEDURE      extensive tental surgery - implants and bridges     FRACTURE SURGERY Right 2019    Right femur fracture percutaneous reduction with interlocking intramedullary long femoral nailing    HERNIA SURGERY      hernia repair after spinal surgery     SPINE SURGERY PROCEDURE UNLISTED      traumatic injury to L3 spinal surgery with metal       Family History  Family History   Problem Relation Age of Onset    Cancer Mother         breast    Cancer Sister         colon    Heart Disorder Brother     Other (Other) Other         osteoarthritis, osteoporosis       Social History  Social History     Socioeconomic History    Marital status:    Tobacco Use    Smoking status: Former     Packs/day: 1.00     Years: 20.00     Additional pack years: 0.00     Total pack years: 20.00     Types: Cigarettes     Quit date:  1996     Years since quittin.0    Smokeless tobacco: Never   Substance and Sexual Activity    Alcohol use: No     Alcohol/week: 0.0 standard drinks of alcohol    Drug use: No   Other Topics Concern    Caffeine Concern Yes     Comment: 2 cups coffee    Exercise Yes     Comment: aerobic, walking 3-4 times a week    Seat Belt No    Special Diet No    Stress Concern No    Weight Concern No           Current Medications:  Current Facility-Administered Medications   Medication Dose Route Frequency    rosuvastatin (Crestor) tab 10 mg  10 mg Oral Nightly     Medications Prior to Admission   Medication Sig    cholecalciferol (VITAMIN D3) 125 MCG (5000 UT) Oral Cap Take 1 capsule (5,000 Units total) by mouth daily.    Vitamin C 500 MG Oral Tab Take 1 tablet (500 mg total) by mouth daily.    cyanocobalamin 1000 MCG Oral Tab Take 1 tablet (1,000 mcg total) by mouth daily.    Omega-3-acid Ethyl Esters 1 g Oral Cap Take 1 capsule (1 g total) by mouth daily.    Multiple Vitamins-Minerals (QC MENS DAILY MULTIVITAMIN) Oral Tab Take by mouth.    niacin 500 MG Oral Tab Take 1 tablet (500 mg total) by mouth daily with breakfast.    rosuvastatin 10 MG Oral Tab Take 1 tablet (10 mg total) by mouth nightly. (Patient not taking: Reported on 2023)    fluticasone propionate 50 MCG/ACT Nasal Suspension 2 sprays by Nasal route daily.    acetaminophen 500mg Take 500 mg by mouth every 6 (six) hours as needed.         Allergies  Allergies   Allergen Reactions    Sulfa Antibiotics RASH       Review of Systems:   Unable to obtain secondary to patient's current clinical condition        Physical Exam:   Blood pressure 107/82, pulse 115, temperature 98 °F (36.7 °C), temperature source Oral, resp. rate 18, height 5' 8\" (1.727 m), weight 172 lb (78 kg), SpO2 96%.    Constitutional: no acute distress confused  Eyes: PERRL  ENT: nares patent  Neck: neck supple, no JVD  Cardio: RRR, S1 S2  Respiratory: clear to auscultation bilaterally, no  wheezing, rales, rhonchi, crackles  GI: abdomen soft, non tender, active bowel souds, no organomegaly  Extremities: no clubbing, cyanosis, edema  Neurologic: no gross motor deficits  Skin: warm, dry    Results:   Laboratory Data  Lab Results   Component Value Date    WBC 11.0 12/27/2023    HGB 12.7 (L) 12/27/2023    HCT 36.5 (L) 12/27/2023    .0 12/27/2023    CREATSERUM 1.04 12/27/2023    BUN 16 12/27/2023     (L) 12/27/2023    K 4.2 12/27/2023     12/27/2023    CO2 21.0 12/27/2023     (H) 12/27/2023    CA 9.0 12/27/2023    ALB 4.3 12/27/2023    ALKPHO 60 12/27/2023    TP 7.5 12/27/2023    AST 39 (H) 12/27/2023    ALT 33 12/27/2023    T4F 0.81 06/08/2018    TSH 1.842 11/02/2023    PSA 1.0 06/08/2018    DDIMER 0.94 (H) 12/27/2023     10/28/2022    B12 709 06/08/2018         Imaging  CT CHEST PE AORTA (IV ONLY) (CPT=71260)    Result Date: 12/27/2023  CONCLUSION:   No PE  9 x 5.5 centimeter area of ground-glass opacity containing small internal areas of pulmonary consolidation in the periphery of the right lower lobe most likely representing pulmonary infection.  There are small foci of airways inflammation/infection in the apical RUL  Spiculated 2.1 x 1.8 centimeter pulmonary nodule in the anterior segment RUL is concerning for primary pulmonary malignancy.  This is immediately adjacent to the anterior segmental bronchus and should be amenable to bronchoscopy  Mild diffuse intrathoracic adenopathy most pronounced at the right hilum and subcarinal space, indeterminate    Dictated by (CST): Ravindra Cross MD on 12/27/2023 at 8:58 AM     Finalized by (CST): Ravindra Cross MD on 12/27/2023 at 9:11 AM          CT SPINE CERVICAL (CPT=72125)    Result Date: 12/27/2023  CONCLUSION:  1. No acute fracture or posttraumatic malalignment cervical spine.  2. Multilevel degenerative changes are seen throughout the cervical spine.  3. Left greater than right subtotal mastoid effusion; correlation for  relevant symptomatology is advised.  4. Partially visualized at least moderate centrilobular emphysematous disease.  5. Lesser incidental findings as above.    Dictated by (CST): Yuri Kang MD on 12/27/2023 at 7:36 AM     Finalized by (CST): Yuri Kang MD on 12/27/2023 at 7:39 AM          CT BRAIN OR HEAD (80733)    Result Date: 12/27/2023  CONCLUSION:  1. Negative for depressed calvarial fracture, coup/contrecoup intraparenchymal contusion, intracranial hemorrhage, or further evidence of acute intracranial process by noncontrast CT technique.  2. Senescent changes of parenchymal volume loss with sequela of chronic microvascular ischemic disease.  3. There is large vessel atherosclerosis involvement of the anterior and posterior intracranial circulations.  4. Lesser incidental findings as above.      Dictated by (CST): Yuri Kang MD on 12/27/2023 at 7:34 AM     Finalized by (CST): Yuri Kang MD on 12/27/2023 at 7:36 AM          XR CHEST AP PORTABLE  (CPT=71045)    Result Date: 12/27/2023  CONCLUSION:  Stable chest demonstrating borderline cardiomegaly without radiographically evident acute intrathoracic process.    Dictated by (CST): Yuri Kang MD on 12/27/2023 at 7:14 AM     Finalized by (CST): Yuri Kang MD on 12/27/2023 at 7:15 AM           Assessment   S/p fall  Groundglass lung opacities  Right upper lobe lung nodule  Mediastinal/hilar lymphadenopathy  5.    Toxic metabolic encephalopathy    Plan   -Patient presents status post fall.  -CT head with no acute intracranial abnormality seen  -CT chest with area of groundglass opacities most pronounced right lower lobe concerning for underlying infectious process.  Spiculated 2.1 cm right upper lobe nodularity concerning for malignancy.  Some evidence of mediastinal and hilar lymphadenopathy also noted.    -Unable to discuss CT findings with patient given his current mental status.  Attempted to call wife Yohana but could not be  reached.  -Empiric IV antibiotic therapy at this time with Rocephin and azithromycin for presumed pneumonia.  Will need to follow-up with outpatient CT versus PET/CT before considering biopsy given concern for malignant nodule with evidence of mediastinal/hilar lymphadenopathy  -Procalcitonin pending.  Legionella and strep pneumonia antigen pending  -Reviewed vitals, labs and imaging    Chester Katz DO  Pulmonary Critical Care Medicine  [facility]  12/27/2023  3:20 PM

## 2023-12-27 NOTE — ED PROVIDER NOTES
Patient Seen in: [facility] Emergency Department      History     Chief Complaint   Patient presents with    Fall     Stated Complaint: Fall    Subjective:   HPI    73 year old male with a past medical history of high cholesterol, osteoporosis, arthritis, ED who presents with fall at home along with persistent malaise.  Wife gives history that both she and the patient had COVID right before Thanksgiving, patient took Paxlovid and states he felt better, but never back to normal.  Patient states he still felt very tired, but then thinks that he may have a new respiratory infection after being around sick people at [employer].  Last night he got up and felt weak, fell.  He does not think he hit his head but wife states the way that she found him his head was wedged between the wall and the furniture and she is fairly certain that he hit his head in some way. Pt states he got up from bed and just felt generally weak, he is not sure if he felt dizzy. Wife states she thinks pt had a fever yesterday.     Objective:   Past Medical History:   Diagnosis Date    Arthritis     Erectile dysfunction     Hearing impairment     [place] - bilateral hearing aids    Hypercholesterolemia     Osteoporosis     PONV (postoperative nausea and vomiting)               Past Surgical History:   Procedure Laterality Date    COLONOSCOPY      DENTAL SURGERY PROCEDURE      extensive tental surgery - implants and bridges     FRACTURE SURGERY Right 04/17/2019    Right femur fracture percutaneous reduction with interlocking intramedullary long femoral nailing    HERNIA SURGERY  1997    hernia repair after spinal surgery     SPINE SURGERY PROCEDURE UNLISTED  1996    traumatic injury to L3 spinal surgery with metal                Social History     Socioeconomic History    Marital status:    Tobacco Use    Smoking status: Former     Packs/day: 1.00     Years: 20.00     Additional pack years: 0.00     Total pack years: 20.00     Types:  Cigarettes     Quit date: 1996     Years since quittin.0    Smokeless tobacco: Never   Substance and Sexual Activity    Alcohol use: No     Alcohol/week: 0.0 standard drinks of alcohol    Drug use: No   Other Topics Concern    Caffeine Concern Yes     Comment: 2 cups coffee    Exercise Yes     Comment: aerobic, walking 3-4 times a week    Seat Belt No    Special Diet No    Stress Concern No    Weight Concern No              Review of Systems    Positive for stated complaint: Fall  Other systems are as noted in HPI.  Constitutional and vital signs reviewed.      All other systems reviewed and negative except as noted above.    Physical Exam     ED Triage Vitals [23 0529]   /77   Pulse 92   Resp 18   Temp 98.9 °F (37.2 °C)   Temp src Oral   SpO2 99 %   O2 Device None (Room air)       Current:/72   Pulse 111   Temp 98.9 °F (37.2 °C) (Oral)   Resp 20   Ht 172.7 cm (5' 8\")   Wt 78 kg   SpO2 95%   BMI 26.15 kg/m²         Physical Exam  Vitals and nursing note reviewed.   Constitutional:       General: He is not in acute distress.     Appearance: Normal appearance. He is well-developed. He is not ill-appearing, toxic-appearing or diaphoretic.   HENT:      Head: Normocephalic and atraumatic.   Eyes:      Conjunctiva/sclera: Conjunctivae normal.      Pupils: Pupils are equal, round, and reactive to light.   Cardiovascular:      Rate and Rhythm: Regular rhythm. Tachycardia present.      Pulses: Normal pulses.      Heart sounds: Normal heart sounds. No murmur heard.  Pulmonary:      Effort: Pulmonary effort is normal. No respiratory distress.      Breath sounds: Normal breath sounds. No decreased air movement or transmitted upper airway sounds. No wheezing.   Abdominal:      General: There is no distension.      Palpations: Abdomen is soft.      Tenderness: There is no abdominal tenderness. There is no guarding.   Musculoskeletal:         General: No tenderness. Normal range of motion.       Cervical back: Full passive range of motion without pain, normal range of motion and neck supple. No rigidity. Normal range of motion.      Right lower leg: No edema.      Left lower leg: No edema.   Skin:     General: Skin is warm and dry.      Findings: No rash.   Neurological:      Mental Status: He is alert and oriented to person, place, and time.      GCS: GCS eye subscore is 4. GCS verbal subscore is 5. GCS motor subscore is 6.      Cranial Nerves: No cranial nerve deficit or facial asymmetry.      Sensory: Sensation is intact. No sensory deficit.      Motor: Motor function is intact. No weakness.   Psychiatric:         Attention and Perception: Attention normal.         Mood and Affect: Mood normal.         Behavior: Behavior normal. Behavior is cooperative.           ED Course     Labs Reviewed   COMP METABOLIC PANEL (14) - Abnormal; Notable for the following components:       Result Value    Glucose 188 (*)     Sodium 132 (*)     AST 39 (*)     All other components within normal limits   URINALYSIS WITH CULTURE REFLEX - Abnormal; Notable for the following components:    Ketones Urine 20 (*)     Blood Urine Trace (*)     Protein Urine 30 (*)     Squamous Epi. Cells Few (*)     All other components within normal limits   D-DIMER - Abnormal; Notable for the following components:    D-Dimer 0.94 (*)     All other components within normal limits   CBC W/ DIFFERENTIAL - Abnormal; Notable for the following components:    HGB 12.7 (*)     HCT 36.5 (*)     Neutrophil Absolute Prelim 8.87 (*)     Neutrophil Absolute 8.87 (*)     Lymphocyte Absolute 0.94 (*)     All other components within normal limits   TROPONIN I HIGH SENSITIVITY - Normal   SARS-COV-2/FLU A AND B/RSV BY PCR (GENEXPERT) - Normal    Narrative:     This test is intended for the qualitative detection and differentiation of SARS-CoV-2, influenza A, influenza B, and respiratory syncytial virus (RSV) viral RNA in nasopharyngeal or nares swabs from  individuals suspected of respiratory viral infection consistent with COVID-19 by their healthcare provider. Signs and symptoms of respiratory viral infection due to SARS-CoV-2, influenza, and RSV can be similar.    Test performed using the Xpert Xpress SARS-CoV-2/FLU/RSV (real time RT-PCR)  assay on the GeneXpert instrument, [employer], [place] [address].   This test is being used under the Food and Drug Administration's Emergency Use Authorization.    The authorized Fact Sheet for Healthcare Providers for this assay is available upon request from the laboratory.   CBC WITH DIFFERENTIAL WITH PLATELET    Narrative:     The following orders were created for panel order CBC With Differential With Platelet.  Procedure                               Abnormality         Status                     ---------                               -----------         ------                     CBC W/ DIFFERENTIAL[[id number]]          Abnormal            Final result                 Please view results for these tests on the individual orders.                 ED Course as of 12/27/23 1041  ------------------------------------------------------------  Time: 12/27 0537  Value: EKG 12 Lead  Comment: EKG    Rate, intervals and axes as noted on EKG Report.  Rate: 114  Rhythm: Sinus Rhythm  Reading: Sinus tachycardia                  MDM      Pulse Ox: 95%, Normal, RA    Cardiac Monitor:   Pulse Readings from Last 1 Encounters:   12/27/23 111   , sinus, abnormal for rate, normal for rhythm     Prior radiology reviewed: no    Radiology findings:     CT BRAIN OR HEAD ([id number])    Result Date: 12/27/2023  CONCLUSION:  1. Negative for depressed calvarial fracture, coup/contrecoup intraparenchymal contusion, intracranial hemorrhage, or further evidence of acute intracranial process by noncontrast CT technique.  2. Senescent changes of parenchymal volume loss with sequela of chronic microvascular ischemic disease.  3. There is large vessel  atherosclerosis involvement of the anterior and posterior intracranial circulations.  4. Lesser incidental findings as above.      Dictated by (CST): Yuri Kang MD on 12/27/2023 at 7:34 AM     Finalized by (CST): Yuri Kang MD on 12/27/2023 at 7:36 AM          XR CHEST AP PORTABLE  (CPT=71045)    Result Date: 12/27/2023  CONCLUSION:  Stable chest demonstrating borderline cardiomegaly without radiographically evident acute intrathoracic process.    Dictated by (CST): Yuri Kang MD on 12/27/2023 at 7:14 AM     Finalized by (CST): Yuri Kang MD on 12/27/2023 at 7:15 AM      CT SPINE CERVICAL (CPT=72125)    Result Date: 12/27/2023  CONCLUSION:  1. No acute fracture or posttraumatic malalignment cervical spine.  2. Multilevel degenerative changes are seen throughout the cervical spine.  3. Left greater than right subtotal mastoid effusion; correlation for relevant symptomatology is advised.  4. Partially visualized at least moderate centrilobular emphysematous disease.  5. Lesser incidental findings as above.    Dictated by (CST): Yuir Kang MD on 12/27/2023 at 7:36 AM     Finalized by (CST): Yuri Kang MD on 12/27/2023 at 7:39 AM    ---------------------------------------------------------------              Medications   sodium chloride 0.9 % IV bolus 1,000 mL (1,000 mL Intravenous Handoff 12/27/23 1022)   iopamidol 76% (ISOVUE-370) injection for power injector (85 mL Intravenous Given 12/27/23 0848)       Admission disposition: 12/27/2023  9:15 AM           Pt with generalized weakness, unwitnessed fall at home, unclear etiology but sounds like near syncope. CT Brain and C-spine neg. CXR, UA with no acute concerning findings.   Pt awaiting CT Chest read at time of signout.  Given generalized weakness, persistent tachycardia even after IVF, will admit.   D/w Dr Varela - will admit    Pt signed out to Dr Alba to f.u on CT Chest.          Disposition and Plan     Clinical Impression:  1.  Syncope, near    2. Dehydration         Disposition:  Admit  12/27/2023  9:15 am    Follow-up:  No follow-up provider specified.  We recommend that you schedule follow up care with a primary care provider within the next three months to obtain basic health screening including reassessment of your blood pressure.      Medications Prescribed:  Current Discharge Medication List                            Hospital Problems       Present on Admission  Date Reviewed: 12/14/2023            ICD-10-CM Noted POA    * (Principal) Syncope, near R55 12/27/2023 Unknown    Hyponatremia E87.1 12/27/2023 Yes

## 2023-12-27 NOTE — ED INITIAL ASSESSMENT (HPI)
Pt. Arrives via EMS for a fall. Per EMS, pt. States his wife and himself have had a virus/not feeling well the past few days causing weakness.     Pt. States he slipped out of bed, pt. denies LOC, denies hitting head. Pt. Not on blood thinners.    Per EMS, pt.'s wife states she thinks he hit his head, unwitnessed.     Per EMS, pt. [place], pt. In c-collar due to stating neck pain.   Pt. Confirms not hitting his head. Pt. Is a&ox4.

## 2023-12-27 NOTE — ED QUICK NOTES
Orders for admission, patient is aware of plan and ready to go upstairs. Any questions, please call ED RN Nan at extension [phone].     Patient Covid vaccination status: Fully vaccinated     COVID Test Ordered in ED: SARS-CoV-2/Flu A and B/RSV by PCR (GeneXpert)    COVID Suspicion at Admission: N/A    Running Infusions:      Mental Status/LOC at time of transport: x3    Other pertinent information: very hard of hearing without hearing aides   CIWA score: N/A   NIH score:  N/A

## 2023-12-27 NOTE — H&P
[facility]    History and Physical    Ronn Blank Patient Status:  Observation    1950 MRN [id number]   Location [facility] 1W Attending Oziel Sanders MD   Hosp Day # 0 PCP OZIEL SANDERS MD     Date:  2023  Date of Admission:  2023      HPI:     Chief Complaint   Patient presents with    Fall     HPI  Pt is a 74 y/o male with PMH of Hypercholesterolemia and osteoporosis presented to the ED from home s/p fall. Per wife patient had COVID right before thanksgi and since then has been lethargic. Last night he fell denies loss of consciousness but per wife she found him with his head wedged between the wall and furniture. CT spine and brain negative for acute process and CXR and UA negative. Pt was admitted for further eval and treatment.     Pt seen today resting in bed. NAD noted.  History     Past Medical History:   Diagnosis Date    Arthritis     Erectile dysfunction     Hearing impairment     [place] - bilateral hearing aids    Hypercholesterolemia     Osteoporosis     PONV (postoperative nausea and vomiting)      Past Surgical History:   Procedure Laterality Date    COLONOSCOPY      DENTAL SURGERY PROCEDURE      extensive tental surgery - implants and bridges     FRACTURE SURGERY Right 2019    Right femur fracture percutaneous reduction with interlocking intramedullary long femoral nailing    HERNIA SURGERY      hernia repair after spinal surgery     SPINE SURGERY PROCEDURE UNLISTED      traumatic injury to L3 spinal surgery with metal     Family History   Problem Relation Age of Onset    Cancer Mother         breast    Cancer Sister         colon    Heart Disorder Brother     Other (Other) Other         osteoarthritis, osteoporosis     Social History:  Social History     Socioeconomic History    Marital status:    Tobacco Use    Smoking status: Former     Packs/day: 1.00     Years: 20.00     Additional pack years: 0.00     Total pack years:  20.00     Types: Cigarettes     Quit date: 1996     Years since quittin.0    Smokeless tobacco: Never   Substance and Sexual Activity    Alcohol use: No     Alcohol/week: 0.0 standard drinks of alcohol    Drug use: No   Other Topics Concern    Caffeine Concern Yes     Comment: 2 cups coffee    Exercise Yes     Comment: aerobic, walking 3-4 times a week    Seat Belt No    Special Diet No    Stress Concern No    Weight Concern No     Social Determinants of Health     Food Insecurity: No Food Insecurity (2023)    Food Insecurity     Food Insecurity: Never true   Transportation Needs: No Transportation Needs (2023)    Transportation Needs     Lack of Transportation: No   Housing Stability: Low Risk  (2023)    Housing Stability     Housing Instability: No     Allergies/Medications:   Allergies:   Allergies   Allergen Reactions    Sulfa Antibiotics RASH     Medications Prior to Admission   Medication Sig    cholecalciferol (VITAMIN D3) 125 MCG (5000 UT) Oral Cap Take 1 capsule (5,000 Units total) by mouth daily.    Vitamin C 500 MG Oral Tab Take 1 tablet (500 mg total) by mouth daily.    cyanocobalamin 1000 MCG Oral Tab Take 1 tablet (1,000 mcg total) by mouth daily.    Omega-3-acid Ethyl Esters 1 g Oral Cap Take 1 capsule (1 g total) by mouth daily.    Multiple Vitamins-Minerals (QC MENS DAILY MULTIVITAMIN) Oral Tab Take by mouth.    niacin 500 MG Oral Tab Take 1 tablet (500 mg total) by mouth daily with breakfast.    rosuvastatin 10 MG Oral Tab Take 1 tablet (10 mg total) by mouth nightly. (Patient not taking: Reported on 2023)    fluticasone propionate 50 MCG/ACT Nasal Suspension 2 sprays by Nasal route daily.    acetaminophen 500mg Take 500 mg by mouth every 6 (six) hours as needed.         Review of Systems:     Constitutional:  Negative for chills and fever.   Respiratory:  Negative for cough and shortness of breath.    Cardiovascular:  Negative for chest pain and palpitations.    Gastrointestinal:  Negative for nausea, vomiting and diarrhea.   Musculoskeletal:  Negative for back pain.   Neurological:  Negative for dizziness and speech difficulty.   Psychiatric/Behavioral:  Negative for confusion and agitation.        Physical Exam:   Vital Signs:  Blood pressure 107/82, pulse 115, temperature 98 °F (36.7 °C), temperature source Oral, resp. rate 18, height 5' 8\" (1.727 m), weight 172 lb (78 kg), SpO2 96%.  Physical Exam  Vitals and nursing note reviewed.   Constitutional:       Appearance: Normal appearance.   HENT:      Head: Normocephalic and atraumatic.      Nose: Nose normal.   Cardiovascular:      Rate and Rhythm: Tachycardia present.      Pulses: Normal pulses.      Heart sounds: Normal heart sounds.   Pulmonary:      Effort: Pulmonary effort is normal.      Breath sounds: Normal breath sounds.   Abdominal:      General: Bowel sounds are normal.      Palpations: Abdomen is soft.   Musculoskeletal:         General: Normal range of motion.      Cervical back: Normal range of motion.   Skin:     General: Skin is warm and dry.   Neurological:      General: No focal deficit present.      Mental Status: He is alert and oriented to person, place, and time.   Psychiatric:         Mood and Affect: Mood normal.         Behavior: Behavior normal.           Results:     Lab Results   Component Value Date    WBC 11.0 12/27/2023    HGB 12.7 (L) 12/27/2023    HCT 36.5 (L) 12/27/2023    .0 12/27/2023    CREATSERUM 1.04 12/27/2023    BUN 16 12/27/2023     (L) 12/27/2023    K 4.2 12/27/2023     12/27/2023    CO2 21.0 12/27/2023     (H) 12/27/2023    CA 9.0 12/27/2023    ALB 4.3 12/27/2023    ALKPHO 60 12/27/2023    BILT 0.8 12/27/2023    TP 7.5 12/27/2023    AST 39 (H) 12/27/2023    ALT 33 12/27/2023    T4F 0.81 06/08/2018    TSH 1.842 11/02/2023    PSA 1.0 06/08/2018    DDIMER 0.94 (H) 12/27/2023    TROPHS 10 12/27/2023     10/28/2022    B12 709 06/08/2018     CT CHEST  PE AORTA (IV ONLY) (CPT=71260)    Result Date: 12/27/2023  CONCLUSION:   No PE  9 x 5.5 centimeter area of ground-glass opacity containing small internal areas of pulmonary consolidation in the periphery of the right lower lobe most likely representing pulmonary infection.  There are small foci of airways inflammation/infection in the apical RUL  Spiculated 2.1 x 1.8 centimeter pulmonary nodule in the anterior segment RUL is concerning for primary pulmonary malignancy.  This is immediately adjacent to the anterior segmental bronchus and should be amenable to bronchoscopy  Mild diffuse intrathoracic adenopathy most pronounced at the right hilum and subcarinal space, indeterminate    Dictated by (CST): Ravindra Crsos MD on 12/27/2023 at 8:58 AM     Finalized by (CST): Ravindra Cross MD on 12/27/2023 at 9:11 AM          CT SPINE CERVICAL (CPT=72125)    Result Date: 12/27/2023  CONCLUSION:  1. No acute fracture or posttraumatic malalignment cervical spine.  2. Multilevel degenerative changes are seen throughout the cervical spine.  3. Left greater than right subtotal mastoid effusion; correlation for relevant symptomatology is advised.  4. Partially visualized at least moderate centrilobular emphysematous disease.  5. Lesser incidental findings as above.    Dictated by (CST): Yuri Kang MD on 12/27/2023 at 7:36 AM     Finalized by (CST): Yuri Kang MD on 12/27/2023 at 7:39 AM          CT BRAIN OR HEAD (40331)    Result Date: 12/27/2023  CONCLUSION:  1. Negative for depressed calvarial fracture, coup/contrecoup intraparenchymal contusion, intracranial hemorrhage, or further evidence of acute intracranial process by noncontrast CT technique.  2. Senescent changes of parenchymal volume loss with sequela of chronic microvascular ischemic disease.  3. There is large vessel atherosclerosis involvement of the anterior and posterior intracranial circulations.  4. Lesser incidental findings as above.      Dictated by  (CST): Yuri Kang MD on 12/27/2023 at 7:34 AM     Finalized by (CST): Yuri Kang MD on 12/27/2023 at 7:36 AM          XR CHEST AP PORTABLE  (CPT=71045)    Result Date: 12/27/2023  CONCLUSION:  Stable chest demonstrating borderline cardiomegaly without radiographically evident acute intrathoracic process.    Dictated by (CST): Yuri Kang MD on 12/27/2023 at 7:14 AM     Finalized by (CST): Yuri Kang MD on 12/27/2023 at 7:15 AM             Assessment/Plan:   *Syncope, near  CT spine and brain negative for acute process  Troponin negative   Fall precautions  Monitor    *Pulmonary consolidation   CT chest: ground glass opacity and pulmonary consolidation most likely representing pulmonary infection  Afebrile/no leukocytosis  On RA  Pulmonology consulted - recs appreciated    *Pulmonary malignancy-suspicious per CT  CT chest: 2.1 x 1.8 cm pulmonary nodule concerning for primary pulmonary malignancy     *HLD  Cont rosuvastatin       DVT prophylaxis: SCDs  Code status: FULL CODE    ELEAZAR Dumont MD  12/27/2023

## 2023-12-28 ENCOUNTER — APPOINTMENT (OUTPATIENT)
Dept: GENERAL RADIOLOGY | Facility: HOSPITAL | Age: 73
End: 2023-12-28
Attending: HOSPITALIST
Payer: MEDICARE

## 2023-12-28 LAB
AMMONIA PLAS-MCNC: <10 UMOL/L (ref 11–32)
BASE EXCESS BLD CALC-SCNC: -0.9 MMOL/L (ref ?–2)
BASE EXCESS BLD CALC-SCNC: -1.5 MMOL/L (ref ?–2)
BASOPHILS # BLD AUTO: 0.03 X10(3) UL (ref 0–0.2)
BASOPHILS NFR BLD AUTO: 0.3 %
DEPRECATED RDW RBC AUTO: 43.8 FL (ref 35.1–46.3)
EOSINOPHIL # BLD AUTO: 0 X10(3) UL (ref 0–0.7)
EOSINOPHIL NFR BLD AUTO: 0 %
ERYTHROCYTE [DISTWIDTH] IN BLOOD BY AUTOMATED COUNT: 13.1 % (ref 11–15)
GLUCOSE BLDC GLUCOMTR-MCNC: 140 MG/DL (ref 70–99)
GLUCOSE BLDC GLUCOMTR-MCNC: 142 MG/DL (ref 70–99)
GLUCOSE BLDC GLUCOMTR-MCNC: 168 MG/DL (ref 70–99)
HCO3 BLDA-SCNC: 23.5 MEQ/L (ref 21–27)
HCO3 BLDA-SCNC: 24.2 MEQ/L (ref 21–27)
HCT VFR BLD AUTO: 42.9 %
HGB BLD-MCNC: 14.1 G/DL
IMM GRANULOCYTES # BLD AUTO: 0.04 X10(3) UL (ref 0–1)
IMM GRANULOCYTES NFR BLD: 0.4 %
LACTATE SERPL-SCNC: 1 MMOL/L (ref 0.5–2)
LACTATE SERPL-SCNC: 2.1 MMOL/L (ref 0.5–2)
LYMPHOCYTES # BLD AUTO: 1.1 X10(3) UL (ref 1–4)
LYMPHOCYTES NFR BLD AUTO: 11.4 %
MCH RBC QN AUTO: 30 PG (ref 26–34)
MCHC RBC AUTO-ENTMCNC: 32.9 G/DL (ref 31–37)
MCV RBC AUTO: 91.3 FL
MONOCYTES # BLD AUTO: 0.94 X10(3) UL (ref 0.1–1)
MONOCYTES NFR BLD AUTO: 9.8 %
NEUTROPHILS # BLD AUTO: 7.53 X10 (3) UL (ref 1.5–7.7)
NEUTROPHILS # BLD AUTO: 7.53 X10(3) UL (ref 1.5–7.7)
NEUTROPHILS NFR BLD AUTO: 78.1 %
O2 CT BLD-SCNC: 16.3 VOL% (ref 15–23)
O2 CT BLD-SCNC: 18.2 VOL% (ref 15–23)
OXYGEN LITERS/MINUTE: 15 L/MIN
OXYGEN LITERS/MINUTE: 2 L/MIN
PCO2 BLDA: 26 MM HG (ref 35–45)
PCO2 BLDA: 29 MM HG (ref 35–45)
PH BLDA: 7.47 [PH] (ref 7.35–7.45)
PH BLDA: 7.51 [PH] (ref 7.35–7.45)
PLATELET # BLD AUTO: 258 10(3)UL (ref 150–450)
PO2 BLDA: 141 MM HG (ref 80–100)
PO2 BLDA: 55 MM HG (ref 80–100)
PROCALCITONIN SERPL-MCNC: 0.37 NG/ML (ref ?–0.05)
PUNCTURE CHARGE: YES
PUNCTURE CHARGE: YES
RBC # BLD AUTO: 4.7 X10(6)UL
SAO2 % BLDA: 90.7 % (ref 94–100)
SAO2 % BLDA: >99 % (ref 94–100)
WBC # BLD AUTO: 9.6 X10(3) UL (ref 4–11)

## 2023-12-28 PROCEDURE — 71045 X-RAY EXAM CHEST 1 VIEW: CPT | Performed by: HOSPITALIST

## 2023-12-28 PROCEDURE — 99291 CRITICAL CARE FIRST HOUR: CPT | Performed by: INTERNAL MEDICINE

## 2023-12-28 PROCEDURE — 99232 SBSQ HOSP IP/OBS MODERATE 35: CPT | Performed by: INTERNAL MEDICINE

## 2023-12-28 RX ORDER — NICOTINE POLACRILEX 4 MG
15 LOZENGE BUCCAL
Status: DISCONTINUED | OUTPATIENT
Start: 2023-12-28 | End: 2024-01-04

## 2023-12-28 RX ORDER — NICOTINE POLACRILEX 4 MG
30 LOZENGE BUCCAL
Status: DISCONTINUED | OUTPATIENT
Start: 2023-12-28 | End: 2024-01-04

## 2023-12-28 RX ORDER — ACETAMINOPHEN 650 MG/1
650 SUPPOSITORY RECTAL ONCE
Status: COMPLETED | OUTPATIENT
Start: 2023-12-28 | End: 2023-12-28

## 2023-12-28 RX ORDER — MEPERIDINE HYDROCHLORIDE 25 MG/ML
25 INJECTION INTRAMUSCULAR; INTRAVENOUS; SUBCUTANEOUS ONCE
Status: COMPLETED | OUTPATIENT
Start: 2023-12-28 | End: 2023-12-28

## 2023-12-28 RX ORDER — DEXTROSE MONOHYDRATE 25 G/50ML
50 INJECTION, SOLUTION INTRAVENOUS
Status: DISCONTINUED | OUTPATIENT
Start: 2023-12-28 | End: 2024-01-04

## 2023-12-28 RX ORDER — ACETAMINOPHEN 325 MG/1
650 TABLET ORAL EVERY 6 HOURS PRN
Status: DISCONTINUED | OUTPATIENT
Start: 2023-12-28 | End: 2024-01-04

## 2023-12-28 RX ORDER — ACETAMINOPHEN 650 MG/1
650 SUPPOSITORY RECTAL EVERY 6 HOURS PRN
Status: DISCONTINUED | OUTPATIENT
Start: 2023-12-28 | End: 2024-01-04

## 2023-12-28 NOTE — CM/SW NOTE
12/28/23 1100   CM/SW Referral Data   Referral Source Social Work (self-referral)   Reason for Referral Discharge planning   Informant Spouse/Significant Other   Medical Hx   Does patient have an established PCP? Yes  (Oziel Varela)   Patient Info   Patient's Current Mental Status at Time of Assessment Confused or unable to complete assessment;Alert;Memory Impairments   Patient's Home Environment House   Number of Levels in Home 1   Number of Stair in Home 7 external steps to enter   Patient lives with Spouse/Significant other   Patient Status Prior to Admission   Independent with ADLs and Mobility Yes   Discharge Needs   Anticipated D/C needs Home health care;Subacute rehab     SW spoke w/spouse to gather above information.  SW confirms pt resides at address on face sheet.  SW confirms HH and SNF hx.  Pt spouse states that home has been recently updated to accommodate pt and spouse long term with the option to hire private cg.  Pt still drives and does not use a device to ambulate at baseline.    PT recommendation: Pending, pt spouse refuses hh if recommended, and is open to SNF.  Tentative referral uploaded, will need PASRR if recommended.    SW/CM to remain available for support and/or discharge planning.      Adriane Cassidy, MSW, LSW  Social Work   Ext:#[phone]

## 2023-12-28 NOTE — PLAN OF CARE
RRT    *See RRT Documentation Record*    Reason the RRT was called: Tachycardic, hypoxic, seizure-like activity  Assessment of patient leading up to RRT: Drowsy. Confused, 2L of oxygen  Interventions/Testing: ABG, lactic, ammonia  Patient Outcome/Disposition: transfer to PCU  Family Notified: yes  Name of family notified: Wife, Yohana, at bedside

## 2023-12-28 NOTE — PROGRESS NOTES
[facility]    Progress Note    Ronn Blank Patient Status:  Observation    1950 MRN [id number]   Location [facility] 1W Attending Oziel Sanders MD   Hosp Day # 0 PCP OZIEL SANDERS MD     Chief Complaint:     Subjective:   Subjective:  Pt seen and examined today resting in bed. NAD noted, arousable but confused.     Objective:   Blood pressure (!) 146/99, pulse 110, temperature 99.9 °F (37.7 °C), temperature source Oral, resp. rate 18, height 5' 8\" (1.727 m), weight 172 lb (78 kg), SpO2 93%.  Physical Exam  Constitutional:       General: He is not in acute distress.     Appearance: He is ill-appearing.   HENT:      Nose: Nose normal.      Mouth/Throat:      Mouth: Mucous membranes are moist.   Eyes:      Pupils: Pupils are equal, round, and reactive to light.   Cardiovascular:      Rate and Rhythm: Normal rate and regular rhythm.      Heart sounds: Normal heart sounds. No murmur heard.  Pulmonary:      Effort: No respiratory distress.      Breath sounds: Rales present.      Comments: + Bibasilar crackles  Abdominal:      General: Bowel sounds are normal. There is no distension.      Palpations: Abdomen is soft.   Musculoskeletal:         General: No swelling or tenderness.   Neurological:      Mental Status: He is alert. He is disoriented.   Psychiatric:         Mood and Affect: Mood normal.         Behavior: Behavior normal.         Results:   Lab Results   Component Value Date    WBC 11.0 2023    HGB 12.7 (L) 2023    HCT 36.5 (L) 2023    .0 2023    CREATSERUM 1.04 2023    BUN 16 2023     (L) 2023    K 4.2 2023     2023    CO2 21.0 2023     (H) 2023    CA 9.0 2023    ALB 4.3 2023    ALKPHO 60 2023    BILT 0.8 2023    TP 7.5 2023    AST 39 (H) 2023    ALT 33 2023    T4F 0.81 2018    TSH 1.842 2023    PSA 1.0 2018    DDIMER  0.94 (H) 12/27/2023    TROPHS 10 12/27/2023     10/28/2022    B12 709 06/08/2018       CT CHEST PE AORTA (IV ONLY) (CPT=71260)    Result Date: 12/27/2023  CONCLUSION:   No PE  9 x 5.5 centimeter area of ground-glass opacity containing small internal areas of pulmonary consolidation in the periphery of the right lower lobe most likely representing pulmonary infection.  There are small foci of airways inflammation/infection in the apical RUL  Spiculated 2.1 x 1.8 centimeter pulmonary nodule in the anterior segment RUL is concerning for primary pulmonary malignancy.  This is immediately adjacent to the anterior segmental bronchus and should be amenable to bronchoscopy  Mild diffuse intrathoracic adenopathy most pronounced at the right hilum and subcarinal space, indeterminate    Dictated by (CST): Ravindra Cross MD on 12/27/2023 at 8:58 AM     Finalized by (CST): Ravindra Cross MD on 12/27/2023 at 9:11 AM          CT SPINE CERVICAL (CPT=72125)    Result Date: 12/27/2023  CONCLUSION:  1. No acute fracture or posttraumatic malalignment cervical spine.  2. Multilevel degenerative changes are seen throughout the cervical spine.  3. Left greater than right subtotal mastoid effusion; correlation for relevant symptomatology is advised.  4. Partially visualized at least moderate centrilobular emphysematous disease.  5. Lesser incidental findings as above.    Dictated by (CST): Yuri aKng MD on 12/27/2023 at 7:36 AM     Finalized by (CST): Yuri Kang MD on 12/27/2023 at 7:39 AM          CT BRAIN OR HEAD (27855)    Result Date: 12/27/2023  CONCLUSION:  1. Negative for depressed calvarial fracture, coup/contrecoup intraparenchymal contusion, intracranial hemorrhage, or further evidence of acute intracranial process by noncontrast CT technique.  2. Senescent changes of parenchymal volume loss with sequela of chronic microvascular ischemic disease.  3. There is large vessel atherosclerosis involvement of the anterior  and posterior intracranial circulations.  4. Lesser incidental findings as above.      Dictated by (CST): Yuri Kang MD on 12/27/2023 at 7:34 AM     Finalized by (CST): Yuri Kang MD on 12/27/2023 at 7:36 AM          XR CHEST AP PORTABLE  (CPT=71045)    Result Date: 12/27/2023  CONCLUSION:  Stable chest demonstrating borderline cardiomegaly without radiographically evident acute intrathoracic process.    Dictated by (CST): Yuri Kang MD on 12/27/2023 at 7:14 AM     Finalized by (CST): Yuri Kang MD on 12/27/2023 at 7:15 AM         EKG 12 Lead    Result Date: 12/27/2023  Poor data quality, interpretation may be adversely affected Sinus tachycardia Left anterior fascicular block Inferior infarct , age undetermined Abnormal ECG No previous ECGs found in [facility] Confirmed by SEAN COHEN STEVEN ([id number]) on 12/27/2023 9:20:40 AM     Assessment & Plan:   *Syncope, near  CT spine and brain negative for acute process  Troponin negative   Fall precautions  Monitor     *Pulmonary consolidation   CT chest: ground glass opacity and pulmonary consolidation most likely representing pulmonary infection  No PE  D-dimer 0.94  Now febrile with TMAX 100.8F today   Procal 0.37  On 2L O2  Pulmonology following   Cont rocephin and Zithromax   Check CBC     *Pulmonary malignancy-suspicious per CT  No PE  CT chest: 2.1 x 1.8 cm pulmonary nodule concerning for primary pulmonary malignancy      *HLD  Cont rosuvastatin     *Hyponatremia      Cont IVF NS 0.45   Monitor labs         DVT prophylaxis: SCDs  Code status: FULL CODE    NAT SANDERS MD  12/28/2023

## 2023-12-28 NOTE — PLAN OF CARE
Patient has safety precautions in place bed in the lowest position, bed alarm on, and call light within reach. Plan of care ongoing. No further concerns as of present.    Problem: SAFETY ADULT - FALL  Goal: Free from fall injury  Description: INTERVENTIONS:  - Assess pt frequently for physical needs  - Identify cognitive and physical deficits and behaviors that affect risk of falls.  - [place] fall precautions as indicated by assessment.  - Educate pt/family on patient safety including physical limitations  - Instruct pt to call for assistance with activity based on assessment  - Modify environment to reduce risk of injury  - Provide assistive devices as appropriate  - Consider OT/PT consult to assist with strengthening/mobility  - Encourage toileting schedule  Outcome: Progressing     Problem: NEUROLOGICAL - ADULT  Goal: Achieves stable or improved neurological status  Description: INTERVENTIONS  - Assess for and report changes in neurological status  - Initiate measures to prevent increased intracranial pressure  - Maintain blood pressure and fluid volume within ordered parameters to optimize cerebral perfusion and minimize risk of hemorrhage  - Monitor temperature, glucose, and sodium. Initiate appropriate interventions as ordered  Outcome: Progressing     Problem: PAIN - ADULT  Goal: Verbalizes/displays adequate comfort level or patient's stated pain goal  Description: INTERVENTIONS:  - Encourage pt to monitor pain and request assistance  - Assess pain using appropriate pain scale  - Administer analgesics based on type and severity of pain and evaluate response  - Implement non-pharmacological measures as appropriate and evaluate response  - Consider cultural and social influences on pain and pain management  - Manage/alleviate anxiety  - Utilize distraction and/or relaxation techniques  - Monitor for opioid side effects  - Notify MD/LIP if interventions unsuccessful or patient reports new pain  - Anticipate  increased pain with activity and pre-medicate as appropriate  Outcome: Progressing     Problem: RISK FOR INFECTION - ADULT  Goal: Absence of fever/infection during anticipated neutropenic period  Description: INTERVENTIONS  - Monitor WBC  - Administer growth factors as ordered  - Implement neutropenic guidelines  Outcome: Progressing     Problem: DISCHARGE PLANNING  Goal: Discharge to home or other facility with appropriate resources  Description: INTERVENTIONS:  - Identify barriers to discharge w/pt and caregiver  - Include patient/family/discharge partner in discharge planning  - Arrange for needed discharge resources and transportation as appropriate  - Identify discharge learning needs (meds, wound care, etc)  - Arrange for interpreters to assist at discharge as needed  - Consider post-discharge preferences of patient/family/discharge partner  - Complete POLST form as appropriate  - Assess patient's ability to be responsible for managing their own health  - Refer to Case Management Department for coordinating discharge planning if the patient needs post-hospital services based on physician/LIP order or complex needs related to functional status, cognitive ability or social support system  Outcome: Progressing

## 2023-12-28 NOTE — PROGRESS NOTES
[facility]     Progress Note    Ronn Blank Patient Status:  Observation    1950 MRN [id number]   Location [facility] 1W Attending zOiel Sanders MD   Hosp Day # 0 PCP OZIEL SANDERS MD       Subjective:   Patient seen and examined.  Resting in bed.  More arousable but still somewhat confused.    Objective:   Blood pressure (!) 146/99, pulse 118, temperature 99.9 °F (37.7 °C), temperature source Oral, resp. rate 18, height 5' 8\" (1.727 m), weight 172 lb (78 kg), SpO2 93%.  Intake/Output:   Last 3 shifts: I/O last 3 completed shifts:  In: 1413.3 [P.O.:240; I.V.:1173.3]  Out: 525 [Urine:525]   This shift: I/O this shift:  In: 400 [P.O.:400]  Out: 500 [Urine:500]     Vent Settings:      Hemodynamic parameters (last 24 hours):      Scheduled Meds:   Current Facility-Administered Medications   Medication Dose Route Frequency    acetaminophen (Tylenol) tab 650 mg  650 mg Oral Q6H PRN    rosuvastatin (Crestor) tab 10 mg  10 mg Oral Nightly    cefTRIAXone (Rocephin) 1 g in D5W 100 mL IVPB-ADD  1 g Intravenous Q24H    azithromycin (Zithromax) tab 500 mg  500 mg Oral Daily    sodium chloride 0.45% infusion   Intravenous Continuous    LORazepam (Ativan) tab 1 mg  1 mg Oral Q6H PRN       Continuous Infusions:    sodium chloride 100 mL/hr at 23 0344       Physical Exam  Constitutional: no acute distress  Eyes: PERRL  ENT: nares pateint  Neck: supple, no JVD  Cardio: RRR, S1 S2  Respiratory: Bibasilar crackles  GI: abdomen soft, non tender, active bowel sounds, no organomegaly  Extremities: no clubbing, cyanosis, edema  Neurologic: no gross motor deficits  Skin: warm, dry      Results:        CT CHEST PE AORTA (IV ONLY) (CPT=71260)    Result Date: 2023  CONCLUSION:   No PE  9 x 5.5 centimeter area of ground-glass opacity containing small internal areas of pulmonary consolidation in the periphery of the right lower lobe most likely representing pulmonary infection.  There are  small foci of airways inflammation/infection in the apical RUL  Spiculated 2.1 x 1.8 centimeter pulmonary nodule in the anterior segment RUL is concerning for primary pulmonary malignancy.  This is immediately adjacent to the anterior segmental bronchus and should be amenable to bronchoscopy  Mild diffuse intrathoracic adenopathy most pronounced at the right hilum and subcarinal space, indeterminate    Dictated by (CST): Ravindra Cross MD on 12/27/2023 at 8:58 AM     Finalized by (CST): Ravindra Cross MD on 12/27/2023 at 9:11 AM          CT SPINE CERVICAL (CPT=72125)    Result Date: 12/27/2023  CONCLUSION:  1. No acute fracture or posttraumatic malalignment cervical spine.  2. Multilevel degenerative changes are seen throughout the cervical spine.  3. Left greater than right subtotal mastoid effusion; correlation for relevant symptomatology is advised.  4. Partially visualized at least moderate centrilobular emphysematous disease.  5. Lesser incidental findings as above.    Dictated by (CST): Yuri Kang MD on 12/27/2023 at 7:36 AM     Finalized by (CST): Yuri Kang MD on 12/27/2023 at 7:39 AM          CT BRAIN OR HEAD (18947)    Result Date: 12/27/2023  CONCLUSION:  1. Negative for depressed calvarial fracture, coup/contrecoup intraparenchymal contusion, intracranial hemorrhage, or further evidence of acute intracranial process by noncontrast CT technique.  2. Senescent changes of parenchymal volume loss with sequela of chronic microvascular ischemic disease.  3. There is large vessel atherosclerosis involvement of the anterior and posterior intracranial circulations.  4. Lesser incidental findings as above.      Dictated by (CST): Yuri Kang MD on 12/27/2023 at 7:34 AM     Finalized by (CST): Yuri Kang MD on 12/27/2023 at 7:36 AM          XR CHEST AP PORTABLE  (CPT=71045)    Result Date: 12/27/2023  CONCLUSION:  Stable chest demonstrating borderline cardiomegaly without radiographically evident  acute intrathoracic process.    Dictated by (CST): Yuri Kang MD on 12/27/2023 at 7:14 AM     Finalized by (CST): Yuri Kang MD on 12/27/2023 at 7:15 AM               Assessment   1.  Status post fall  2.  Groundglass lung opacities  3.  Right upper lobe lung nodule  4.  Mediastinal/hilar lymphadenopathy  5.  Toxic metabolic encephalopathy     Plan   -Patient presents status post fall.  -CT head with no acute intracranial abnormality seen  -CT chest with area of groundglass opacities most pronounced right lower lobe concerning for underlying infectious process.  Spiculated 2.1 cm right upper lobe nodularity concerning for malignancy.  Some evidence of mediastinal and hilar lymphadenopathy also noted.    -Unable to discuss CT findings with patient given his current mental status.  Attempted to call wife Yohana but could not be reached.  -Empiric IV antibiotic therapy at this time with Rocephin and azithromycin for presumed pneumonia.  Will need to follow-up with outpatient CT versus PET/CT before considering biopsy given concern for malignant nodule with evidence of mediastinal/hilar lymphadenopathy  -Procalcitonin elevated 0.37.        Chester Katz,   Pulmonary Critical Care Medicine  [facility]

## 2023-12-28 NOTE — PROGRESS NOTES
Rapid response called for possible seizure activity.   Upon my arrival, the nurse told me the patient was shaking, urinated on himself but was also following commands and answering questions.   The patient at the time of interview was febrile, sob, currently receiving a neb treatment.   He was responsive to verbal and tactile stimuli.     Exam  Gen ill appearing, AO x1-2  Chest bilateral basal crackles  CVS normal s1 and s2 RRR  Abd NABs soft NT ND, obese  Ext no edema in bilateral LE    A/P    ?CAP  Imaging reviewed  Initial ABG reviewed  Continue emperic iv abx  Ordered LA, ammonia and repeat ABG  Appreciate pulm recs    Primary informed by SURAJ.     Elder Mark MD

## 2023-12-29 PROBLEM — R56.9 SEIZURE-LIKE ACTIVITY (HCC): Status: ACTIVE | Noted: 2023-12-29

## 2023-12-29 LAB
ANION GAP SERPL CALC-SCNC: 8 MMOL/L (ref 0–18)
ATRIAL RATE: 129 BPM
BASOPHILS # BLD AUTO: 0.02 X10(3) UL (ref 0–0.2)
BASOPHILS NFR BLD AUTO: 0.2 %
BNP SERPL-MCNC: 281 PG/ML
BUN BLD-MCNC: 16 MG/DL (ref 9–23)
BUN/CREAT SERPL: 15.5 (ref 10–20)
CALCIUM BLD-MCNC: 8.8 MG/DL (ref 8.7–10.4)
CHLORIDE SERPL-SCNC: 99 MMOL/L (ref 98–112)
CO2 SERPL-SCNC: 24 MMOL/L (ref 21–32)
CREAT BLD-MCNC: 1.03 MG/DL
DEPRECATED RDW RBC AUTO: 41.7 FL (ref 35.1–46.3)
EGFRCR SERPLBLD CKD-EPI 2021: 76 ML/MIN/1.73M2 (ref 60–?)
EOSINOPHIL # BLD AUTO: 0 X10(3) UL (ref 0–0.7)
EOSINOPHIL NFR BLD AUTO: 0 %
ERYTHROCYTE [DISTWIDTH] IN BLOOD BY AUTOMATED COUNT: 13 % (ref 11–15)
GLUCOSE BLD-MCNC: 162 MG/DL (ref 70–99)
GLUCOSE BLDC GLUCOMTR-MCNC: 116 MG/DL (ref 70–99)
GLUCOSE BLDC GLUCOMTR-MCNC: 140 MG/DL (ref 70–99)
GLUCOSE BLDC GLUCOMTR-MCNC: 170 MG/DL (ref 70–99)
GLUCOSE BLDC GLUCOMTR-MCNC: 173 MG/DL (ref 70–99)
GLUCOSE BLDC GLUCOMTR-MCNC: 173 MG/DL (ref 70–99)
HCT VFR BLD AUTO: 38.7 %
HGB BLD-MCNC: 13.1 G/DL
IMM GRANULOCYTES # BLD AUTO: 0.03 X10(3) UL (ref 0–1)
IMM GRANULOCYTES NFR BLD: 0.4 %
LYMPHOCYTES # BLD AUTO: 0.9 X10(3) UL (ref 1–4)
LYMPHOCYTES NFR BLD AUTO: 10.7 %
MCH RBC QN AUTO: 29.6 PG (ref 26–34)
MCHC RBC AUTO-ENTMCNC: 33.9 G/DL (ref 31–37)
MCV RBC AUTO: 87.4 FL
MONOCYTES # BLD AUTO: 0.76 X10(3) UL (ref 0.1–1)
MONOCYTES NFR BLD AUTO: 9 %
NEUTROPHILS # BLD AUTO: 6.72 X10 (3) UL (ref 1.5–7.7)
NEUTROPHILS # BLD AUTO: 6.72 X10(3) UL (ref 1.5–7.7)
NEUTROPHILS NFR BLD AUTO: 79.7 %
OSMOLALITY SERPL CALC.SUM OF ELEC: 277 MOSM/KG (ref 275–295)
P AXIS: 64 DEGREES
P-R INTERVAL: 132 MS
PLATELET # BLD AUTO: 267 10(3)UL (ref 150–450)
POTASSIUM SERPL-SCNC: 3.7 MMOL/L (ref 3.5–5.1)
PROCALCITONIN SERPL-MCNC: 0.53 NG/ML (ref ?–0.05)
Q-T INTERVAL: 326 MS
QRS DURATION: 100 MS
QTC CALCULATION (BEZET): 477 MS
R AXIS: -57 DEGREES
RBC # BLD AUTO: 4.43 X10(6)UL
SODIUM SERPL-SCNC: 131 MMOL/L (ref 136–145)
T AXIS: 49 DEGREES
VENTRICULAR RATE: 129 BPM
WBC # BLD AUTO: 8.4 X10(3) UL (ref 4–11)

## 2023-12-29 PROCEDURE — 99233 SBSQ HOSP IP/OBS HIGH 50: CPT | Performed by: INTERNAL MEDICINE

## 2023-12-29 PROCEDURE — 99223 1ST HOSP IP/OBS HIGH 75: CPT | Performed by: OTHER

## 2023-12-29 RX ORDER — METOPROLOL TARTRATE 1 MG/ML
5 INJECTION, SOLUTION INTRAVENOUS ONCE
Status: COMPLETED | OUTPATIENT
Start: 2023-12-29 | End: 2023-12-29

## 2023-12-29 RX ORDER — METOPROLOL TARTRATE 1 MG/ML
5 INJECTION, SOLUTION INTRAVENOUS EVERY 6 HOURS PRN
Status: DISCONTINUED | OUTPATIENT
Start: 2023-12-29 | End: 2024-01-04

## 2023-12-29 RX ORDER — MEPERIDINE HYDROCHLORIDE 25 MG/ML
25 INJECTION INTRAMUSCULAR; INTRAVENOUS; SUBCUTANEOUS ONCE
Status: DISCONTINUED | OUTPATIENT
Start: 2023-12-29 | End: 2024-01-04

## 2023-12-29 RX ORDER — ENOXAPARIN SODIUM 100 MG/ML
40 INJECTION SUBCUTANEOUS DAILY
Status: DISCONTINUED | OUTPATIENT
Start: 2023-12-29 | End: 2024-01-04

## 2023-12-29 RX ORDER — ADENOSINE 3 MG/ML
INJECTION, SOLUTION INTRAVENOUS
Status: DISPENSED
Start: 2023-12-29 | End: 2023-12-30

## 2023-12-29 NOTE — PLAN OF CARE
Patient arrived from ED pod 5 post rapid response. Patient arrived febrile with tremors and tachycardic. MD notified and IV antibiotics changed. Tylenol suppository given. Patient kept on non rebreather and made NPO, patient put in restraints to prevent removal of mask. Patient weaned onto high flow nasal cannula. Restraints discontinued. Around 1900, patient desaturated into 70's with tachycardia in 150's and tremulous. Patient had a fever and APN ordered another dose of rectal tylenol. Discussed code status with wife and wife became extremely upset. Nocturnist at bedside.  Problem: SAFETY ADULT - FALL  Goal: Free from fall injury  Description: INTERVENTIONS:  - Assess pt frequently for physical needs  - Identify cognitive and physical deficits and behaviors that affect risk of falls.  - [place] fall precautions as indicated by assessment.  - Educate pt/family on patient safety including physical limitations  - Instruct pt to call for assistance with activity based on assessment  - Modify environment to reduce risk of injury  - Provide assistive devices as appropriate  - Consider OT/PT consult to assist with strengthening/mobility  - Encourage toileting schedule  Outcome: Progressing     Problem: NEUROLOGICAL - ADULT  Goal: Achieves stable or improved neurological status  Description: INTERVENTIONS  - Assess for and report changes in neurological status  - Initiate measures to prevent increased intracranial pressure  - Maintain blood pressure and fluid volume within ordered parameters to optimize cerebral perfusion and minimize risk of hemorrhage  - Monitor temperature, glucose, and sodium. Initiate appropriate interventions as ordered  Outcome: Progressing     Problem: PAIN - ADULT  Goal: Verbalizes/displays adequate comfort level or patient's stated pain goal  Description: INTERVENTIONS:  - Encourage pt to monitor pain and request assistance  - Assess pain using appropriate pain scale  - Administer analgesics  based on type and severity of pain and evaluate response  - Implement non-pharmacological measures as appropriate and evaluate response  - Consider cultural and social influences on pain and pain management  - Manage/alleviate anxiety  - Utilize distraction and/or relaxation techniques  - Monitor for opioid side effects  - Notify MD/LIP if interventions unsuccessful or patient reports new pain  - Anticipate increased pain with activity and pre-medicate as appropriate  Outcome: Progressing     Problem: RISK FOR INFECTION - ADULT  Goal: Absence of fever/infection during anticipated neutropenic period  Description: INTERVENTIONS  - Monitor WBC  - Administer growth factors as ordered  - Implement neutropenic guidelines  Outcome: Progressing     Problem: DISCHARGE PLANNING  Goal: Discharge to home or other facility with appropriate resources  Description: INTERVENTIONS:  - Identify barriers to discharge w/pt and caregiver  - Include patient/family/discharge partner in discharge planning  - Arrange for needed discharge resources and transportation as appropriate  - Identify discharge learning needs (meds, wound care, etc)  - Arrange for interpreters to assist at discharge as needed  - Consider post-discharge preferences of patient/family/discharge partner  - Complete POLST form as appropriate  - Assess patient's ability to be responsible for managing their own health  - Refer to Case Management Department for coordinating discharge planning if the patient needs post-hospital services based on physician/LIP order or complex needs related to functional status, cognitive ability or social support system  Outcome: Progressing     Problem: RESPIRATORY - ADULT  Goal: Achieves optimal ventilation and oxygenation  Description: INTERVENTIONS:  - Assess for changes in respiratory status  - Assess for changes in mentation and behavior  - Position to facilitate oxygenation and minimize respiratory effort  - Oxygen supplementation  based on oxygen saturation or ABGs  - Provide Smoking Cessation handout, if applicable  - Encourage broncho-pulmonary hygiene including cough, deep breathe, Incentive Spirometry  - Assess the need for suctioning and perform as needed  - Assess and instruct to report SOB or any respiratory difficulty  - Respiratory Therapy support as indicated  - Manage/alleviate anxiety  - Monitor for signs/symptoms of CO2 retention  Outcome: Not Progressing     Problem: CARDIOVASCULAR - ADULT  Goal: Maintains optimal cardiac output and hemodynamic stability  Description: INTERVENTIONS:  - Monitor vital signs, rhythm, and trends  - Monitor for bleeding, hypotension and signs of decreased cardiac output  - Evaluate effectiveness of vasoactive medications to optimize hemodynamic stability  - Monitor arterial and/or venous puncture sites for bleeding and/or hematoma  - Assess quality of pulses, skin color and temperature  - Assess for signs of decreased coronary artery perfusion - ex. Angina  - Evaluate fluid balance, assess for edema, trend weights  Outcome: Progressing  Goal: Absence of cardiac arrhythmias or at baseline  Description: INTERVENTIONS:  - Continuous cardiac monitoring, monitor vital signs, obtain 12 lead EKG if indicated  - Evaluate effectiveness of antiarrhythmic and heart rate control medications as ordered  - Initiate emergency measures for life threatening arrhythmias  - Monitor electrolytes and administer replacement therapy as ordered  Outcome: Progressing     Problem: Safety Risk - Non-Violent Restraints  Goal: Patient will remain free from self-harm  Description: INTERVENTIONS:  - Apply the least restrictive restraint to prevent harm  - Notify patient and family of reasons restraints applied  - Assess for any contributing factors to confusion (electrolyte disturbances, delirium, medications)  - Discontinue any unnecessary medical devices as soon as possible  - Assess the patient's physical comfort, circulation,  skin condition, hydration, nutrition and elimination needs   - Reorient and redirection as needed  - Assess for the need to continue restraints  Outcome: Progressing

## 2023-12-29 NOTE — CONSULTS
[facility]    Report of Consultation    Ronn Blank Patient Status:  Inpatient    1950 MRN [id number]   Location [facility] 2W/SW Attending Oziel Sanders MD   Hosp Day # 1 PCP OZIEL SANDERS MD     Date of Admission:  2023  Date of Consult:  2023  Reason for Consultation:   Episodes of shaking.    History of Present Illness:   Patient is a 73 year old male who was admitted to the hospital for Syncope, near:  Patient with a recent COVID infection in 2023, since then has been continued with coughing, not feeling well, chronic fatigue.  Apparently fell from the bed and therefore brought to the hospital in 2023.  Was found to have pneumonia, CT of the chest also showed possibility of some other lesions.  CT of the brain was done and it was not revealing.  Patient had few episodes of shaking, tremulousness, with tachycardia in 130s, briefly desaturated with one of the episodes, but came back quickly.  There was no obvious loss of consciousness, his wife felt that his shaking was consistent with a seizure and not shivering.    Past Medical History  Past Medical History:   Diagnosis Date    Arthritis     Erectile dysfunction     Hearing impairment     [place] - bilateral hearing aids    Hypercholesterolemia     Osteoporosis     PONV (postoperative nausea and vomiting)        Past Surgical History  Past Surgical History:   Procedure Laterality Date    COLONOSCOPY      DENTAL SURGERY PROCEDURE      extensive tental surgery - implants and bridges     FRACTURE SURGERY Right 2019    Right femur fracture percutaneous reduction with interlocking intramedullary long femoral nailing    HERNIA SURGERY      hernia repair after spinal surgery     SPINE SURGERY PROCEDURE UNLISTED      traumatic injury to L3 spinal surgery with metal       Family History  Family History   Problem Relation Age of Onset    Cancer Mother         breast    Cancer Sister          colon    Heart Disorder Brother     Other (Other) Other         osteoarthritis, osteoporosis       Social History  Pediatric History   Patient Parents    Not on file     Other Topics Concern    Caffeine Concern Yes     Comment: 2 cups coffee    Exercise Yes     Comment: aerobic, walking 3-4 times a week    Seat Belt No    Special Diet No    Stress Concern No    Weight Concern No   Social History Narrative    Not on file           Current Medications:  Current Facility-Administered Medications   Medication Dose Route Frequency    meperidine (Demerol) 25 MG/ML injection 25 mg  25 mg Intravenous Once    acetaminophen (Tylenol) tab 650 mg  650 mg Oral Q6H PRN    acetaminophen (Tylenol) rectal suppository 650 mg  650 mg Rectal Q6H PRN    glucose (Dex4) 15 GM/59ML oral liquid 15 g  15 g Oral Q15 Min PRN    Or    glucose (Glutose) 40% oral gel 15 g  15 g Oral Q15 Min PRN    Or    glucose-vitamin C (Dex-4) chewable tab 4 tablet  4 tablet Oral Q15 Min PRN    Or    dextrose 50% injection 50 mL  50 mL Intravenous Q15 Min PRN    Or    glucose (Dex4) 15 GM/59ML oral liquid 30 g  30 g Oral Q15 Min PRN    Or    glucose (Glutose) 40% oral gel 30 g  30 g Oral Q15 Min PRN    Or    glucose-vitamin C (Dex-4) chewable tab 8 tablet  8 tablet Oral Q15 Min PRN    insulin regular human (Novolin R, Humulin R) 100 UNIT/ML injection 1-5 Units  1-5 Units Subcutaneous 4 times per day    piperacillin-tazobactam (Zosyn) 3.375 g in dextrose 5% 100 mL IVPB-ADDV  3.375 g Intravenous Q8H    rosuvastatin (Crestor) tab 10 mg  10 mg Oral Nightly    sodium chloride 0.45% infusion   Intravenous Continuous    LORazepam (Ativan) tab 1 mg  1 mg Oral Q6H PRN     Medications Prior to Admission   Medication Sig    cholecalciferol (VITAMIN D3) 125 MCG (5000 UT) Oral Cap Take 1 capsule (5,000 Units total) by mouth daily.    Vitamin C 500 MG Oral Tab Take 1 tablet (500 mg total) by mouth daily.    cyanocobalamin 1000 MCG Oral Tab Take 1 tablet (1,000 mcg total) by  mouth daily.    Omega-3-acid Ethyl Esters 1 g Oral Cap Take 1 capsule (1 g total) by mouth daily.    Multiple Vitamins-Minerals (QC MENS DAILY MULTIVITAMIN) Oral Tab Take by mouth.    niacin 500 MG Oral Tab Take 1 tablet (500 mg total) by mouth daily with breakfast.    rosuvastatin 10 MG Oral Tab Take 1 tablet (10 mg total) by mouth nightly. (Patient not taking: Reported on 12/27/2023)    fluticasone propionate 50 MCG/ACT Nasal Suspension 2 sprays by Nasal route daily.    acetaminophen 500mg Take 500 mg by mouth every 6 (six) hours as needed.         Allergies  Allergies   Allergen Reactions    Sulfa Antibiotics RASH       Review of Systems:   As in HPI, the rest of the 14 system review was done and was negative    Physical Exam:     Vitals:    12/29/23 0400 12/29/23 0500 12/29/23 0600 12/29/23 0700   BP: 135/86 141/88 154/78 (!) 128/94   Pulse: 115 120 116 (!) 123   Resp: (!) 37 (!) 37 (!) 40 (!) 29   Temp: 99.7 °F (37.6 °C)      TempSrc: Temporal      SpO2: 95% 94% 97% 98%   Weight:       Height:           General: No apparent distress, well nourished, well groomed.  Head- Normocephalic, atraumatic  Eyes- No redness or swelling  ENT-very poor hearing  Neck- No masses or adenopathy  Cv: pulses were palpable and normal, no cyanosis or edema     Neurological:     Mental Status- Alert, appears anxious, but able to tell me the year, month and where he was.    Cranial Nerves:  II.- Visual fields full to confrontation    VII. Face symmetric, no facial weakness  VIII. Hearing significantly impaired.  IX. Pallet elevates symmetrically.  XI. Shoulder shrug is intact  XII. Tongue is midline    Motor Exam:  Muscle tone normal  No atrophy or fasciculations  Strength- upper extremities 5/5 proximally and distally                    Limited examination, patient is very hard of hearing.  During examination he had an episode of tremulousness, somewhat rhythmic, but without any evaluation and evaluation bilateral shaking and  tremulousness, he was able to answer questions during this event.    Results:     Laboratory Data:  Lab Results   Component Value Date    WBC 8.4 12/29/2023    HGB 13.1 12/29/2023    HCT 38.7 (L) 12/29/2023    .0 12/29/2023    CREATSERUM 1.03 12/29/2023    BUN 16 12/29/2023     (L) 12/29/2023    K 3.7 12/29/2023    CL 99 12/29/2023    CO2 24.0 12/29/2023     (H) 12/29/2023    CA 8.8 12/29/2023    ALB 4.3 12/27/2023    ALKPHO 60 12/27/2023    TP 7.5 12/27/2023    AST 39 (H) 12/27/2023    ALT 33 12/27/2023    T4F 0.81 06/08/2018    TSH 1.842 11/02/2023    PSA 1.0 06/08/2018    DDIMER 0.94 (H) 12/27/2023     10/28/2022    B12 709 06/08/2018         Imaging:    XR CHEST AP PORTABLE  (CPT=71045)    Result Date: 12/28/2023  CONCLUSION:  1. Patchy right greater than left basilar opacities are new or more conspicuous since previous day chest radiograph.  Findings are concerning for pneumonia/aspiration and radiographic follow-up to resolution is advised. 2. Subtle spiculated right upper lobe nodule is suspected and better assessed on previous day chest CT.   Dictated by (CST): Mukesh Pierre MD on 12/28/2023 at 8:07 PM     Finalized by (CST): Mukesh Pierre MD on 12/28/2023 at 8:08 PM          CT CHEST PE AORTA (IV ONLY) (CPT=71260)    Result Date: 12/27/2023  CONCLUSION:   No PE  9 x 5.5 centimeter area of ground-glass opacity containing small internal areas of pulmonary consolidation in the periphery of the right lower lobe most likely representing pulmonary infection.  There are small foci of airways inflammation/infection in the apical RUL  Spiculated 2.1 x 1.8 centimeter pulmonary nodule in the anterior segment RUL is concerning for primary pulmonary malignancy.  This is immediately adjacent to the anterior segmental bronchus and should be amenable to bronchoscopy  Mild diffuse intrathoracic adenopathy most pronounced at the right hilum and subcarinal space, indeterminate    Dictated by  (CST): Ravindra Cross MD on 12/27/2023 at 8:58 AM     Finalized by (CST): Ravindra Cross MD on 12/27/2023 at 9:11 AM             CT of the head was independently reviewed, obvious acute changes or any obvious edema.    Impression:       Patient with a pneumonia, febrile, frequent shivering episodes.  Some concern for possibility of seizure activity and therefore continuous EEG will be started.      However at this point description of the events and observed event was not very consistent with a seizure and therefore we will hold off of antiepileptic medication unless EEG shows evidence of epileptic activity.    Since there was also mention of some nodule that could be concerning for the malignancy on CT of the chest we will also do MRI of the brain to rule out any other etiology.    Thank you for allowing me to participate in the care of your patient.    Nicolas Chavez MD  12/29/2023

## 2023-12-29 NOTE — OCCUPATIONAL THERAPY NOTE
12/29/23 1133   VISIT TYPE   OT Inpatient Visit Type  Attempted Evaluation  Pt is on cont EEG at this time.   OT Follow Up   Charge Missed visit   Follow Up Needed  Hold at this time       Pt is a 73 Y.O male who was admitted S/P fall. Pt's spouse reported pt had COVID-19 viral infection after Thanksgiving and has been lethargic. Pt was diagnosed with pneumonia, toxic metabolic encephalopathy, acute on respiratory hypoxemic respiratory failure and possible seizure.    \"-CT head with no acute intracranial abnormality seen  -CT chest with area of groundglass opacities most pronounced right lower lobe concerning for underlying infectious process.  Spiculated 2.1 cm right upper lobe nodularity concerning for malignancy.  Some evidence of mediastinal and hilar lymphadenopathy also noted.  Discussed findings with wife.  Recommend outpatient PET/CT within the next month if patient stable given concern for malignancy.\"      Randall Leiva, OTR/L  Occupational Therapy  [facility]

## 2023-12-29 NOTE — PROGRESS NOTES
Asked to evaluate patient related to tachycardia, fever s/p RRT for seizure like activity   Admitted to hospital with pneumonia, covid infection in November, resp panel negative   +post covid fatigue=>long covid symptoms present  CXR with evidence of RLL pneumonia   Evaluated by neurology this morning    Patient is currently resting in bed in no distress, some shaking noted around the mouth area and arms  Patient awake and able to answer questions and states he feels relaxed  EEG being started     Heart rate<120 currently, appears sinus tachycardia with some pvc's, EKG reviewed  CT chest shows nodule,concerning for malignancy, will need outpt w/u  Awaiting MRI brain later today    Current tremors do not appear to be seizures,unsure of baseline functional status   Likely related to fever and acute illness/pna  Elevated procalcitonin, likely bacterial pna, on zosyn  UA negative  BC negative thus far    Added prn lopressor with parameters for better HR control    Norma Busby NP  [facility]

## 2023-12-29 NOTE — PROGRESS NOTES
[facility]    Progress Note    Ronn Blank Patient Status:  Observation    1950 MRN [id number]   Location [facility] 1W Attending Oziel Sanders MD   Hosp Day # 1 PCP OZIEL SANDERS MD     Chief Complaint:     Subjective:   Subjective:  Pt seen and examined today resting in bed. In CCU.  NAD noted. Awake and alert, answering questions appropriately.     Objective:   Blood pressure 107/75, pulse 99, temperature 97.1 °F (36.2 °C), temperature source Temporal, resp. rate (!) 32, height 5' 8\" (1.727 m), weight 172 lb (78 kg), SpO2 94%.  Physical Exam  Constitutional:       General: He is not in acute distress.     Appearance: He is ill-appearing.   HENT:      Nose: Nose normal.      Mouth/Throat:      Mouth: Mucous membranes are moist.   Eyes:      Pupils: Pupils are equal, round, and reactive to light.   Cardiovascular:      Rate and Rhythm: Normal rate and regular rhythm.      Heart sounds: Normal heart sounds. No murmur heard.  Pulmonary:      Effort: Pulmonary effort is normal. No respiratory distress.      Comments: + Bibasilar crackles  Abdominal:      General: Bowel sounds are normal. There is no distension.      Palpations: Abdomen is soft.   Musculoskeletal:         General: No swelling or tenderness.   Neurological:      Mental Status: He is alert and oriented to person, place, and time. Mental status is at baseline.   Psychiatric:         Mood and Affect: Mood normal.         Behavior: Behavior normal.         Results:   Lab Results   Component Value Date    WBC 8.4 2023    HGB 13.1 2023    HCT 38.7 (L) 2023    .0 2023    CREATSERUM 1.03 2023    BUN 16 2023     (L) 2023    K 3.7 2023    CL 99 2023    CO2 24.0 2023     (H) 2023    CA 8.8 2023    ALB 4.3 2023    ALKPHO 60 2023    BILT 0.8 2023    TP 7.5 2023    AST 39 (H) 2023    ALT 33 2023     T4F 0.81 06/08/2018    TSH 1.842 11/02/2023    PSA 1.0 06/08/2018    DDIMER 0.94 (H) 12/27/2023    TROPHS 10 12/27/2023     10/28/2022    B12 709 06/08/2018       XR CHEST AP PORTABLE  (CPT=71045)    Result Date: 12/28/2023  CONCLUSION:  1. Patchy right greater than left basilar opacities are new or more conspicuous since previous day chest radiograph.  Findings are concerning for pneumonia/aspiration and radiographic follow-up to resolution is advised. 2. Subtle spiculated right upper lobe nodule is suspected and better assessed on previous day chest CT.   Dictated by (CST): Mukesh Pierre MD on 12/28/2023 at 8:07 PM     Finalized by (CST): Mukesh Pierre MD on 12/28/2023 at 8:08 PM         EKG 12 Lead    Result Date: 12/27/2023  Poor data quality, interpretation may be adversely affected Sinus tachycardia Left anterior fascicular block Inferior infarct , age undetermined Abnormal ECG No previous ECGs found in [facility] Confirmed by SEAN COHEN STEVEN ([id number]) on 12/27/2023 9:20:40 AM     Assessment & Plan:   *Syncope, near  CT spine and brain negative for acute process  Troponin negative   Fall precautions  Monitor    *Fever/Tremors  RRT called 12/28 for shivering/tremor like activities  Tmax 101.4  Transferred to CCU  Continuous EEG in progress  MRI brain   Cont IV abx   Neurology following     *AMS  Pt baseline A/O x 3 today  ?secondary to pneumonia vs. Seizure  Neurology following      *Pulmonary consolidation   CT chest: ground glass opacity and pulmonary consolidation most likely representing pulmonary infection  No PE  D-dimer 0.94  Now febrile with TMAX 100.8F today   Procal 0.37  On 2L O2  Cont rocephin and Zithromax   Pulmonology following      *Pulmonary malignancy-suspicious per CT  No PE  CT chest: 2.1 x 1.8 cm pulmonary nodule concerning for primary pulmonary malignancy      *HLD  Cont rosuvastatin     *Hyponatremia      Cont IVF NS 0.45   Monitor labs         DVT prophylaxis: SCDs  Code  status: DNAR/Full treatment     ELEAZAR Dumont MD  12/29/2023

## 2023-12-29 NOTE — PLAN OF CARE
Problem: NEUROLOGICAL - ADULT  Goal: Achieves stable or improved neurological status  Description: INTERVENTIONS  - Assess for and report changes in neurological status  - Initiate measures to prevent increased intracranial pressure  - Maintain blood pressure and fluid volume within ordered parameters to optimize cerebral perfusion and minimize risk of hemorrhage  - Monitor temperature, glucose, and sodium. Initiate appropriate interventions as ordered  Outcome: Progressing     Problem: PAIN - ADULT  Goal: Verbalizes/displays adequate comfort level or patient's stated pain goal  Description: INTERVENTIONS:  - Encourage pt to monitor pain and request assistance  - Assess pain using appropriate pain scale  - Administer analgesics based on type and severity of pain and evaluate response  - Implement non-pharmacological measures as appropriate and evaluate response  - Consider cultural and social influences on pain and pain management  - Manage/alleviate anxiety  - Utilize distraction and/or relaxation techniques  - Monitor for opioid side effects  - Notify MD/LIP if interventions unsuccessful or patient reports new pain  - Anticipate increased pain with activity and pre-medicate as appropriate  Outcome: Progressing     Problem: RESPIRATORY - ADULT  Goal: Achieves optimal ventilation and oxygenation  Description: INTERVENTIONS:  - Assess for changes in respiratory status  - Assess for changes in mentation and behavior  - Position to facilitate oxygenation and minimize respiratory effort  - Oxygen supplementation based on oxygen saturation or ABGs  - Provide Smoking Cessation handout, if applicable  - Encourage broncho-pulmonary hygiene including cough, deep breathe, Incentive Spirometry  - Assess the need for suctioning and perform as needed  - Assess and instruct to report SOB or any respiratory difficulty  - Respiratory Therapy support as indicated  - Manage/alleviate anxiety  - Monitor for signs/symptoms of CO2  retention  Outcome: Progressing

## 2023-12-29 NOTE — CDS QUERY
CLINICAL DOCUMENTATION CLARIFICATION FORM  Dear Nichole:  Clinical information (provided below) includes documentation of sodium value that is not within the normal range.   PLEASE (X) ALL DIAGNOSES THAT APPLY.  THIS SECTION FOR PROVIDER DOCUMENTATION ONLY  (  X ) Hyponatremia  (  ) Clinically Insignificant above normal sodium laboratory result  (  ) Other (please specify):       Clinical Indicators:  Na level: 131       Risk Factors:   73yom with syncope/fall and pneumonia  Dehydration-ED Provider Note 12/27     Treatments:  Trend labs  IVF                       If you have any questions, please contact Clinical : Aparna Fitzpatrick RN CDS  at [email or web address]    Thank You!  THIS FORM IS A PERMANENT PART OF THE MEDICAL RECORD

## 2023-12-29 NOTE — SLP NOTE
ADULT SWALLOWING EVALUATION    ASSESSMENT    ASSESSMENT/OVERALL IMPRESSION:  PPE REQUIRED. THIS THERAPIST WORE GLOVES AND MASK FOR DURATION OF EVALUATION. HANDS WASHED UPON ENTRANCE/EXIT.    Per MD H&P, \"Pt is a 72 y/o male with PMH of Hypercholesterolemia and osteoporosis presented to the ED from home s/p fall. Per wife patient had COVID right before thanksgiving and since then has been lethargic. Last night he fell denies loss of consciousness but per wife she found him with his head wedged between the wall and furniture. CT spine and brain negative for acute process and CXR and UA negative. Pt was admitted for further eval and treatment.\"    SLP BSSE orders received and acknowledged. A swallow evaluation warranted per \"lethargy, difficulty swallowing\". Pt afebrile with clear vocal quality, on 8L/HF, with oxygen saturation at 95%. Pt with no hx of dysphagia at [facility].   Pt positioned 90 degrees in bed, alert/cooperative. Pt with no complaints of pain. Oral motor skills within functional limits. Pt presented with trials of hard solids, puree, mildly thick liquids via tsp and thin liquids via straw. Pt with adequate oral acceptance and bilabial seal across all trials. Pt with intact bite, mastication of solids, and timely A-P transit. Pt's swallow response appears timely with adequate hyolaryngeal elevation/excursion. No clinical signs of aspiration (e.g., immediate/delayed throat clear, immediate/delayed cough, wet vocal quality, increased O2 effort) observed across all trials. 12/28 CXR indicates \"Patchy right greater than left basilar opacities are new or more conspicuous since previous day chest radiograph.  Findings are concerning for pneumonia/aspiration and radiographic follow-up to resolution is advised. Subtle spiculated right upper lobe nodule is suspected and better assessed on previous day chest CT\". Oxygen status remained stable t/o the entire evaluation.     At this time, pt presents with functional oral  and probable pharyngeal swallow stages. Recommend a regular diet and thin liquids with strict adherence to safe swallowing compensatory strategies. Results and recommendations reviewed with RN, pt. Pt v/u to all results/recommendations, no family present. Recommendations remain written on whiteboard. SLP collaborated with RN for MD diet orders.     PLAN: SLP to f/u x1-2 meal assessments, monitor imaging, and VFSS if clinically indicated         RECOMMENDATIONS   Diet Recommendations - Solids: Regular  Diet Recommendations - Liquids: Thin Liquids                        Compensatory Strategies Recommended: Slow rate  Aspiration Precautions: Upright position;Slow rate  Medication Administration Recommendations:  (as tolerated)  Treatment Plan/Recommendations: Aspiration precautions  Discharge Recommendations/Plan: Undetermined    HISTORY   MEDICAL HISTORY  Reason for Referral:  (\"lethargy, difficulty swallowing\")    Problem List  Principal Problem:    Syncope, near  Active Problems:    Hyponatremia    Dehydration    Seizure-like activity (HCC)      Past Medical History  Past Medical History:   Diagnosis Date    Arthritis     Erectile dysfunction     Hearing impairment     [place] - bilateral hearing aids    Hypercholesterolemia     Osteoporosis     PONV (postoperative nausea and vomiting)        Prior Living Situation: Home with spouse  Diet Prior to Admission: Regular;Thin liquids  Precautions: Aspiration    Patient/Family Goals: eat/drink    SWALLOWING HISTORY  Current Diet Consistency: NPO  Dysphagia History: none  Imaging Results: 12/27 CT BRAIN  CONCLUSION:   1. Negative for depressed calvarial fracture, coup/contrecoup intraparenchymal contusion, intracranial hemorrhage, or further evidence of acute intracranial process by noncontrast CT technique.   2. Senescent changes of parenchymal volume loss with sequela of chronic microvascular ischemic disease.   3. There is large vessel atherosclerosis involvement of the  anterior and posterior intracranial circulations.   4. Lesser incidental findings as above.       12/27 CT CHEST  CONCLUSION:   No PE   9 x 5.5 centimeter area of ground-glass opacity containing small internal areas of pulmonary consolidation in the periphery of the right lower lobe most likely representing pulmonary infection.  There are small foci of airways inflammation/infection in   the apical RUL   Spiculated 2.1 x 1.8 centimeter pulmonary nodule in the anterior segment RUL is concerning for primary pulmonary malignancy.  This is immediately adjacent to the anterior segmental bronchus and should be amenable to bronchoscopy   Mild diffuse intrathoracic adenopathy most pronounced at the right hilum and subcarinal space, indeterminate     SUBJECTIVE       OBJECTIVE   ORAL MOTOR EXAMINATION  Dentition: Functional  Symmetry: Within Functional Limits  Strength: Within Functional Limits  Tone: Within Functional Limits  Range of Motion: Within Functional Limits  Rate of Motion: Reduced    Voice Quality: Clear  Respiratory Status: Nasal cannula;Supplemental O2  Consistencies Trialed: Nectar thick liquids;Puree;Thin liquids;Hard solid  Method of Presentation: Staff/Clinician assistance;Spoon;Straw  Patient Positioning: Upright;Midline    Oral Phase of Swallow: Within Functional Limits                      Pharyngeal Phase of Swallow: Within Functional Limits           (Please note: Silent aspiration cannot be evaluated clinically. Videofluoroscopic Swallow Study is required to rule-out silent aspiration.)    Esophageal Phase of Swallow: No complaints consistent with possible esophageal involvement  Comments: NA              GOALS  Goal #1 The patient will tolerate regular consistency and thin liquids without overt signs or symptoms of aspiration with 100 % accuracy over 1-2 session(s).  In Progress   Goal #2 The patient/family/caregiver will demonstrate understanding and implementation of aspiration precautions and  swallow strategies independently over 1-2 session(s).    In Progress     FOLLOW UP  Treatment Plan/Recommendations: Aspiration precautions  Number of Visits to Meet Established Goals:  (1-2)  Follow Up Needed (Documentation Required): Yes  SLP Follow-up Date: 12/30/23    Thank you for your referral.   If you have any questions, please contact BRUNO Varela M.S. CCC-SLP  Speech Language Pathologist  Phone Number [phone]

## 2023-12-29 NOTE — PROGRESS NOTES
[facility]     Progress Note    Ronn Blank Patient Status:  Inpatient    1950 MRN [id number]   Location [facility] 2W/SW Attending Oziel Sanders MD   Hosp Day # 1 PCP OZIEL SANDERS MD       Subjective:   Patient seen and examined.  Resting in bed.  Somnolent but arousable this morning.    Objective:   Blood pressure 155/88, pulse 117, temperature (!) 100.7 °F (38.2 °C), temperature source Temporal, resp. rate (!) 31, height 5' 8\" (1.727 m), weight 172 lb (78 kg), SpO2 99%.  Intake/Output:   Last 3 shifts: I/O last 3 completed shifts:  In: 1685.3 [P.O.:400; I.V.:1185.3; IV PIGGYBACK:100]  Out: 950 [Urine:950]   This shift: No intake/output data recorded.     Vent Settings:      Hemodynamic parameters (last 24 hours):      Scheduled Meds:   Current Facility-Administered Medications   Medication Dose Route Frequency    meperidine (Demerol) 25 MG/ML injection 25 mg  25 mg Intravenous Once    metoprolol (Lopressor) 5 mg/5mL injection 5 mg  5 mg Intravenous Q6H PRN    acetaminophen (Tylenol) tab 650 mg  650 mg Oral Q6H PRN    acetaminophen (Tylenol) rectal suppository 650 mg  650 mg Rectal Q6H PRN    glucose (Dex4) 15 GM/59ML oral liquid 15 g  15 g Oral Q15 Min PRN    Or    glucose (Glutose) 40% oral gel 15 g  15 g Oral Q15 Min PRN    Or    glucose-vitamin C (Dex-4) chewable tab 4 tablet  4 tablet Oral Q15 Min PRN    Or    dextrose 50% injection 50 mL  50 mL Intravenous Q15 Min PRN    Or    glucose (Dex4) 15 GM/59ML oral liquid 30 g  30 g Oral Q15 Min PRN    Or    glucose (Glutose) 40% oral gel 30 g  30 g Oral Q15 Min PRN    Or    glucose-vitamin C (Dex-4) chewable tab 8 tablet  8 tablet Oral Q15 Min PRN    insulin regular human (Novolin R, Humulin R) 100 UNIT/ML injection 1-5 Units  1-5 Units Subcutaneous 4 times per day    piperacillin-tazobactam (Zosyn) 3.375 g in dextrose 5% 100 mL IVPB-ADDV  3.375 g Intravenous Q8H    rosuvastatin (Crestor) tab 10 mg  10 mg Oral Nightly     sodium chloride 0.45% infusion   Intravenous Continuous    LORazepam (Ativan) tab 1 mg  1 mg Oral Q6H PRN       Continuous Infusions:    sodium chloride 75 mL/hr at 12/29/23 0521       Physical Exam  Constitutional: Somnolent but arousable  Eyes: PERRL  ENT: nares pateint  Neck: supple, no JVD  Cardio: RRR, S1 S2  Respiratory: Basilar crackles  GI: abdomen soft, non tender, active bowel sounds, no organomegaly  Extremities: no clubbing, cyanosis, edema  Neurologic: no gross motor deficits  Skin: warm, dry      Results:     Lab Results   Component Value Date    WBC 8.4 12/29/2023    HGB 13.1 12/29/2023    HCT 38.7 12/29/2023    .0 12/29/2023    CREATSERUM 1.03 12/29/2023    BUN 16 12/29/2023     12/29/2023    K 3.7 12/29/2023    CL 99 12/29/2023    CO2 24.0 12/29/2023     12/29/2023    CA 8.8 12/29/2023       XR CHEST AP PORTABLE  (CPT=71045)    Result Date: 12/28/2023  CONCLUSION:  1. Patchy right greater than left basilar opacities are new or more conspicuous since previous day chest radiograph.  Findings are concerning for pneumonia/aspiration and radiographic follow-up to resolution is advised. 2. Subtle spiculated right upper lobe nodule is suspected and better assessed on previous day chest CT.   Dictated by (CST): Mukesh Pierre MD on 12/28/2023 at 8:07 PM     Finalized by (CST): Mukesh Pierre MD on 12/28/2023 at 8:08 PM                 Assessment   1.  Status post fall  2.  Groundglass lung opacities  3.  Right upper lobe lung nodule  4.  Mediastinal/hilar lymphadenopathy  5.  Toxic metabolic encephalopathy  6.  Acute hypoxemic respiratory failure     Plan   -Patient presents status post fall.  -CT head with no acute intracranial abnormality seen  -CT chest with area of groundglass opacities most pronounced right lower lobe concerning for underlying infectious process.  Spiculated 2.1 cm right upper lobe nodularity concerning for malignancy.  Some evidence of mediastinal and hilar  lymphadenopathy also noted.  Discussed findings with wife.  Recommend outpatient PET/CT within the next month if patient stable given concern for malignancy.  -Empiric IV antibiotic therapy at this time with Rocephin and azithromycin for presumed pneumonia.    -Increasing oxygenation requirements.  Episodes of shivering noted with initial concern for possible seizure activity.  Eval by neurology.  EEG pending although low suspicion for seizures.  No prior history of seizures per family.  MRI brain pending.  -DVT prophylaxis: SCDs  -Discussed CODE STATUS with wife.  Patient DNR but agreeable with short-term intubation.  Will update CODE STATUS.    Chester Katz,   Pulmonary Critical Care Medicine  [facility]

## 2023-12-29 NOTE — PLAN OF CARE
Patient had another fever this morning with severe tremors and tachycardia. Rectal tylenol given and patient more alert. Neuro at bedside and witnessed similar episode of seizure like activity but patient was responsive. Patient mental status much more improved from yesterday and more alert. EEG ordered and pending, MRI brain ordered. IV fluids continued. Patient passed swallow eval and having decreased appetite. Accucheck AC/HS. Patient incontinent at times, primofit changed often. IV zosyn continued.  Problem: SAFETY ADULT - FALL  Goal: Free from fall injury  Description: INTERVENTIONS:  - Assess pt frequently for physical needs  - Identify cognitive and physical deficits and behaviors that affect risk of falls.  - [place] fall precautions as indicated by assessment.  - Educate pt/family on patient safety including physical limitations  - Instruct pt to call for assistance with activity based on assessment  - Modify environment to reduce risk of injury  - Provide assistive devices as appropriate  - Consider OT/PT consult to assist with strengthening/mobility  - Encourage toileting schedule  Outcome: Progressing     Problem: NEUROLOGICAL - ADULT  Goal: Achieves stable or improved neurological status  Description: INTERVENTIONS  - Assess for and report changes in neurological status  - Initiate measures to prevent increased intracranial pressure  - Maintain blood pressure and fluid volume within ordered parameters to optimize cerebral perfusion and minimize risk of hemorrhage  - Monitor temperature, glucose, and sodium. Initiate appropriate interventions as ordered  Outcome: Progressing     Problem: PAIN - ADULT  Goal: Verbalizes/displays adequate comfort level or patient's stated pain goal  Description: INTERVENTIONS:  - Encourage pt to monitor pain and request assistance  - Assess pain using appropriate pain scale  - Administer analgesics based on type and severity of pain and evaluate response  - Implement  non-pharmacological measures as appropriate and evaluate response  - Consider cultural and social influences on pain and pain management  - Manage/alleviate anxiety  - Utilize distraction and/or relaxation techniques  - Monitor for opioid side effects  - Notify MD/LIP if interventions unsuccessful or patient reports new pain  - Anticipate increased pain with activity and pre-medicate as appropriate  Outcome: Progressing     Problem: RISK FOR INFECTION - ADULT  Goal: Absence of fever/infection during anticipated neutropenic period  Description: INTERVENTIONS  - Monitor WBC  - Administer growth factors as ordered  - Implement neutropenic guidelines  Outcome: Progressing     Problem: DISCHARGE PLANNING  Goal: Discharge to home or other facility with appropriate resources  Description: INTERVENTIONS:  - Identify barriers to discharge w/pt and caregiver  - Include patient/family/discharge partner in discharge planning  - Arrange for needed discharge resources and transportation as appropriate  - Identify discharge learning needs (meds, wound care, etc)  - Arrange for interpreters to assist at discharge as needed  - Consider post-discharge preferences of patient/family/discharge partner  - Complete POLST form as appropriate  - Assess patient's ability to be responsible for managing their own health  - Refer to Case Management Department for coordinating discharge planning if the patient needs post-hospital services based on physician/LIP order or complex needs related to functional status, cognitive ability or social support system  Outcome: Progressing     Problem: RESPIRATORY - ADULT  Goal: Achieves optimal ventilation and oxygenation  Description: INTERVENTIONS:  - Assess for changes in respiratory status  - Assess for changes in mentation and behavior  - Position to facilitate oxygenation and minimize respiratory effort  - Oxygen supplementation based on oxygen saturation or ABGs  - Provide Smoking Cessation handout,  if applicable  - Encourage broncho-pulmonary hygiene including cough, deep breathe, Incentive Spirometry  - Assess the need for suctioning and perform as needed  - Assess and instruct to report SOB or any respiratory difficulty  - Respiratory Therapy support as indicated  - Manage/alleviate anxiety  - Monitor for signs/symptoms of CO2 retention  Outcome: Progressing     Problem: Patient Centered Care  Goal: Patient preferences are identified and integrated in the patient's plan of care  Description: Interventions:  - What would you like us to know as we care for you?  - Provide timely, complete, and accurate information to patient/family  - Incorporate patient and family knowledge, values, beliefs, and cultural backgrounds into the planning and delivery of care  - Encourage patient/family to participate in care and decision-making at the level they choose  - Honor patient and family perspectives and choices  Outcome: Progressing     Problem: CARDIOVASCULAR - ADULT  Goal: Maintains optimal cardiac output and hemodynamic stability  Description: INTERVENTIONS:  - Monitor vital signs, rhythm, and trends  - Monitor for bleeding, hypotension and signs of decreased cardiac output  - Evaluate effectiveness of vasoactive medications to optimize hemodynamic stability  - Monitor arterial and/or venous puncture sites for bleeding and/or hematoma  - Assess quality of pulses, skin color and temperature  - Assess for signs of decreased coronary artery perfusion - ex. Angina  - Evaluate fluid balance, assess for edema, trend weights  Outcome: Progressing  Goal: Absence of cardiac arrhythmias or at baseline  Description: INTERVENTIONS:  - Continuous cardiac monitoring, monitor vital signs, obtain 12 lead EKG if indicated  - Evaluate effectiveness of antiarrhythmic and heart rate control medications as ordered  - Initiate emergency measures for life threatening arrhythmias  - Monitor electrolytes and administer replacement therapy as  ordered  Outcome: Progressing

## 2023-12-29 NOTE — PROGRESS NOTES
PCCU  Critical Care APRN Progress Note    NAME: Ronn Blank - ROOM: 216/216-A - MRN: [id number] - Age: 73 year old - :1950    Called to bedside for shaking/tremulousness with tachycardia 130's, briefly desaturated on HFNC but readily came back up to 99%. No noted loss of consciousness, no incontinence. Remains alert, offers no new complaints.   Wife at bedside    Ill appearing, hard of hearing  Bi-base rales   Abd soft, obese    OBJECTIVE  Vitals:  /81 (BP Location: Right arm)   Pulse 117   Temp 99.4 °F (37.4 °C) (Temporal)   Resp 24   Ht 172.7 cm (5' 8\")   Wt 172 lb (78 kg)   SpO2 93%   BMI 26.15 kg/m²                Assessment/Plan:    TME- ammonia nl  Pneumonia/RUL nodule    Prn tylenol, continue IV abx, NPO until more awake and able to take po.     Plan of care discussed with intensivist- Dr Katz, Dr Muller- nocturnist.    A total of 20 of critical care time (exclusive of billable procedures) was administered. This involved direct patient intervention, complex decision making, and/or extensive discussions (>50% face to face time) with the patient, family, and clinical staff.      Sayra Alejandro APRN/CNP  Critical Care  2023   7:03 PM

## 2023-12-29 NOTE — CDS QUERY
CLINICAL DOCUMENTATION CLARIFICATION FORM    Dear Dr Varela:  Clinical information (provided below) suggests Potential Risk for Infection.   PLEASE (X) ALL DIAGNOSES THAT APPLY.  SELECTION BY PROVIDER ONLY  ( x )  Sepsis  (  )  Severe Sepsis  (  ) Other (please specify): ___________________________________________    __________________________________________________________________________________________     Clinical:  Fever  Tachycardia  Tachypnea  LA 2.1  TME  AHRF    Risk Factors:  73yom with pneumonia, syncope/fall at home  Treatments:  IV ATB   IVF   Blood Culture  Tele  Restraints  Imaging        If you have any questions, please contact Clinical :  [email or web address]     Thank You!      THIS FORM IS A PERMANENT PART OF THE MEDICAL RECORD

## 2023-12-29 NOTE — PROGRESS NOTES
Spiritual Care Visit Note    Visited With: (P) Family    Spiritual Care Taxonomy:    Intended Effects: (P) Demonstrate caring and concern    Methods: (P) Demonstrate acceptance;Offer support    Interventions: (P) Active listening;Ask guided questions;Provide hospitality;Discuss concerns    Writer responded to RRT called for pt. Pt wife at bedside and writer checked in with her. She shared some concerns with pt care, which writer explored. She did not request any additional support in this, but knows spiritual care is available for support in this. She shared about the Voodoo and sujey community supporting them and praying for his health. Writer affirmed having supportive community and encouraged further engagement with them.     Spiritual Care support can be requested via an Epic consult.      Maira Maldonado MA   Chaplain Resident  [facility], Spiritual Care Department   On-Call 24/7 via EPIC consult

## 2023-12-29 NOTE — PHYSICAL THERAPY NOTE
PT orders recd, chart reviewed. Pt currently having EEG.  Will re attempt when appropriate. Discussed with rn.

## 2023-12-30 PROBLEM — R56.9 SEIZURE-LIKE ACTIVITY (HCC): Status: RESOLVED | Noted: 2023-12-29 | Resolved: 2023-12-30

## 2023-12-30 PROBLEM — R68.83 SHIVERING: Status: ACTIVE | Noted: 2023-12-30

## 2023-12-30 LAB
GLUCOSE BLDC GLUCOMTR-MCNC: 119 MG/DL (ref 70–99)
GLUCOSE BLDC GLUCOMTR-MCNC: 147 MG/DL (ref 70–99)
GLUCOSE BLDC GLUCOMTR-MCNC: 175 MG/DL (ref 70–99)
Q-T INTERVAL: 318 MS
QRS DURATION: 118 MS
QTC CALCULATION (BEZET): 534 MS
R AXIS: -24 DEGREES
T AXIS: 15 DEGREES
VENTRICULAR RATE: 170 BPM

## 2023-12-30 PROCEDURE — 95720 EEG PHY/QHP EA INCR W/VEEG: CPT | Performed by: OTHER

## 2023-12-30 PROCEDURE — 99231 SBSQ HOSP IP/OBS SF/LOW 25: CPT | Performed by: OTHER

## 2023-12-30 PROCEDURE — 99232 SBSQ HOSP IP/OBS MODERATE 35: CPT | Performed by: INTERNAL MEDICINE

## 2023-12-30 PROCEDURE — 95718 EEG PHYS/QHP 2-12 HR W/VEEG: CPT | Performed by: OTHER

## 2023-12-30 PROCEDURE — 99233 SBSQ HOSP IP/OBS HIGH 50: CPT | Performed by: INTERNAL MEDICINE

## 2023-12-30 NOTE — PROGRESS NOTES
Dual RN skin check performed prior to transfer off the unit. Skin check performed by this RN and ARNOLDO Fung. Wound are as follows: No wounds to assess. Will remain available for questions or concerns..

## 2023-12-30 NOTE — PROGRESS NOTES
[facility]    Progress Note    Ronn Blank Patient Status:  Inpatient    1950 MRN [id number]   Location [facility] 2W/SW Attending Oziel Sanders MD   Hosp Day # 2 PCP OZIEL SANDERS MD     Chief Complaint:     Subjective:     Constitutional: Negative.    HENT: Negative.     Respiratory: Negative.     Cardiovascular: Negative.    Gastrointestinal: Negative.    Musculoskeletal: Negative.    Skin: Negative.    Neurological: Negative.    Hematological: Negative.    Psychiatric/Behavioral: Negative.         Objective:   Blood pressure 129/83, pulse 106, temperature 98.7 °F (37.1 °C), resp. rate (!) 27, height 5' 8\" (1.727 m), weight 172 lb (78 kg), SpO2 96%.  Physical Exam  Constitutional:       Appearance: Normal appearance.   HENT:      Head: Normocephalic and atraumatic.   Cardiovascular:      Rate and Rhythm: Normal rate and regular rhythm.      Pulses: Normal pulses.      Heart sounds: Normal heart sounds.   Pulmonary:      Effort: Pulmonary effort is normal.      Breath sounds: Normal breath sounds.   Abdominal:      Palpations: Abdomen is soft.   Musculoskeletal:      Cervical back: Normal range of motion and neck supple.   Skin:     General: Skin is warm.   Neurological:      Mental Status: He is alert. Mental status is at baseline.         Results:   Lab Results   Component Value Date    WBC 8.4 2023    HGB 13.1 2023    HCT 38.7 (L) 2023    .0 2023    CREATSERUM 1.03 2023    BUN 16 2023     (L) 2023    K 3.7 2023    CL 99 2023    CO2 24.0 2023     (H) 2023    CA 8.8 2023    ALB 4.3 2023    ALKPHO 60 2023    BILT 0.8 2023    TP 7.5 2023    AST 39 (H) 2023    ALT 33 2023    T4F 0.81 2018    TSH 1.842 2023    PSA 1.0 2018    DDIMER 0.94 (H) 2023    TROPHS 10 2023     10/28/2022    B12 709 2018       XR  CHEST AP PORTABLE  (CPT=71045)    Result Date: 12/28/2023  CONCLUSION:  1. Patchy right greater than left basilar opacities are new or more conspicuous since previous day chest radiograph.  Findings are concerning for pneumonia/aspiration and radiographic follow-up to resolution is advised. 2. Subtle spiculated right upper lobe nodule is suspected and better assessed on previous day chest CT.   Dictated by (CST): Mukesh Pierre MD on 12/28/2023 at 8:07 PM     Finalized by (CST): Mukesh Pierre MD on 12/28/2023 at 8:08 PM         EKG 12 Lead    Result Date: 12/30/2023  Supraventricular tachycardia with Premature ventricular complexes or Fusion complexes Nonspecific intraventricular conduction delay Nonspecific ST abnormality Abnormal ECG When compared with ECG of 29-DEC-2023 08:32, Fusion complexes are now Present Premature ventricular complexes are now Present The axis Shifted right ST now depressed in Anterior-lateral leads    EKG 12 Lead    Result Date: 12/29/2023  Sinus tachycardia Left axis deviation Inferior infarct (cited on or before 27-DEC-2023) Abnormal ECG When compared with ECG of 27-DEC-2023 05:36, No significant change was found Confirmed by WILFREDO CHASE ([id number]) on 12/29/2023 11:40:05 AM     Assessment & Plan:        *Fever/Tremors  RRT called 12/28 for shivering/tremor like activities  Transferred to CCU  Continuous EEG in progress  MRI brain   Neurology  is following     Syncope, near  CT spine and brain negative for acute process  Troponin negative   Fall precautions  Monitor          *AMS    ?secondary to pneumonia vs. Seizure  Neurology following      *Pulmonary consolidation   CT chest: ground glass opacity and pulmonary consolidation most likely representing pulmonary infection  No PE  D-dimer 0.94  Now febrile with TMAX 100.8F today   Procal 0.37  On 2L O2  Cont rocephin and Zithromax   Pulmonology  is following      *Pulmonary malignancy-suspicious per CT  No PE  CT chest: 2.1 x 1.8 cm  pulmonary nodule concerning for primary pulmonary malignancy      *HLD  Cont rosuvastatin         HAMLET STEVENSON MD  12/30/2023

## 2023-12-30 NOTE — PROCEDURES
Continuous Video EEG report    REFERRING PHYSICIAN: Nat Varela MD    PCP and phone number:  NAT VARELA MD  [phone]    TECHNIQUE: 21 channels of EEG, 2 channels of EOG, and 1 channel of EKG were recorded utilizing the International 10/20 System. The recording was performed in a digitized monopolar referential format and playback was reformatted into various referential and bipolar montages utilizing appropriate filter settings. Automatic seizure and spike detection programs were utilized throughout the recording.  Video was recorded during the study    Total recording 6 hours    CLINICAL DATA:  Patient is sent for the evaluation of possible seizures.    MEDICATION:  Continuous Medications:        Scheduled Medications:  No current outpatient medications on file.    PRN Medications:  metoprolol, acetaminophen, acetaminophen, glucose **OR** glucose **OR** glucose-vitamin C **OR** dextrose **OR** glucose **OR** glucose **OR** glucose-vitamin C, LORazepam    ACTIVATION:  Hyperventilation: Not done  Photic stimulation: Not done  Sleep: Normal sleep architecture was seen.    BACKGROUND  While the patient was awake, the posterior dominant rhythm consisted of well-regulated 9-10 Hz rhythmic waveforms, symmetrically distributed over both posterior quadrants and was reactive to eye opening.    EEG ABNORMALITY  None    IMPRESSION:  This is a normal EEG. No focal, lateralized, or epileptiform features are noted. Clinical correlation required.  After 1 pm leads were removed and recording became not useful

## 2023-12-30 NOTE — PLAN OF CARE
Problem: SAFETY ADULT - FALL  Goal: Free from fall injury  Description: INTERVENTIONS:  - Assess pt frequently for physical needs  - Identify cognitive and physical deficits and behaviors that affect risk of falls.  - [place] fall precautions as indicated by assessment.  - Educate pt/family on patient safety including physical limitations  - Instruct pt to call for assistance with activity based on assessment  - Modify environment to reduce risk of injury  - Provide assistive devices as appropriate  - Consider OT/PT consult to assist with strengthening/mobility  - Encourage toileting schedule  Outcome: Progressing     Problem: NEUROLOGICAL - ADULT  Goal: Achieves stable or improved neurological status  Description: INTERVENTIONS  - Assess for and report changes in neurological status  - Initiate measures to prevent increased intracranial pressure  - Maintain blood pressure and fluid volume within ordered parameters to optimize cerebral perfusion and minimize risk of hemorrhage  - Monitor temperature, glucose, and sodium. Initiate appropriate interventions as ordered  Outcome: Progressing     Problem: PAIN - ADULT  Goal: Verbalizes/displays adequate comfort level or patient's stated pain goal  Description: INTERVENTIONS:  - Encourage pt to monitor pain and request assistance  - Assess pain using appropriate pain scale  - Administer analgesics based on type and severity of pain and evaluate response  - Implement non-pharmacological measures as appropriate and evaluate response  - Consider cultural and social influences on pain and pain management  - Manage/alleviate anxiety  - Utilize distraction and/or relaxation techniques  - Monitor for opioid side effects  - Notify MD/LIP if interventions unsuccessful or patient reports new pain  - Anticipate increased pain with activity and pre-medicate as appropriate  Outcome: Progressing     Problem: RISK FOR INFECTION - ADULT  Goal: Absence of fever/infection during  anticipated neutropenic period  Description: INTERVENTIONS  - Monitor WBC  - Administer growth factors as ordered  - Implement neutropenic guidelines  Outcome: Progressing     Problem: DISCHARGE PLANNING  Goal: Discharge to home or other facility with appropriate resources  Description: INTERVENTIONS:  - Identify barriers to discharge w/pt and caregiver  - Include patient/family/discharge partner in discharge planning  - Arrange for needed discharge resources and transportation as appropriate  - Identify discharge learning needs (meds, wound care, etc)  - Arrange for interpreters to assist at discharge as needed  - Consider post-discharge preferences of patient/family/discharge partner  - Complete POLST form as appropriate  - Assess patient's ability to be responsible for managing their own health  - Refer to Case Management Department for coordinating discharge planning if the patient needs post-hospital services based on physician/LIP order or complex needs related to functional status, cognitive ability or social support system  Outcome: Progressing     Problem: RESPIRATORY - ADULT  Goal: Achieves optimal ventilation and oxygenation  Description: INTERVENTIONS:  - Assess for changes in respiratory status  - Assess for changes in mentation and behavior  - Position to facilitate oxygenation and minimize respiratory effort  - Oxygen supplementation based on oxygen saturation or ABGs  - Provide Smoking Cessation handout, if applicable  - Encourage broncho-pulmonary hygiene including cough, deep breathe, Incentive Spirometry  - Assess the need for suctioning and perform as needed  - Assess and instruct to report SOB or any respiratory difficulty  - Respiratory Therapy support as indicated  - Manage/alleviate anxiety  - Monitor for signs/symptoms of CO2 retention  Outcome: Progressing     Problem: Patient Centered Care  Goal: Patient preferences are identified and integrated in the patient's plan of care  Description:  Interventions:  - What would you like us to know as we care for you?   - Provide timely, complete, and accurate information to patient/family  - Incorporate patient and family knowledge, values, beliefs, and cultural backgrounds into the planning and delivery of care  - Encourage patient/family to participate in care and decision-making at the level they choose  - Honor patient and family perspectives and choices  Outcome: Progressing     Problem: CARDIOVASCULAR - ADULT  Goal: Maintains optimal cardiac output and hemodynamic stability  Description: INTERVENTIONS:  - Monitor vital signs, rhythm, and trends  - Monitor for bleeding, hypotension and signs of decreased cardiac output  - Evaluate effectiveness of vasoactive medications to optimize hemodynamic stability  - Monitor arterial and/or venous puncture sites for bleeding and/or hematoma  - Assess quality of pulses, skin color and temperature  - Assess for signs of decreased coronary artery perfusion - ex. Angina  - Evaluate fluid balance, assess for edema, trend weights  Outcome: Progressing  Goal: Absence of cardiac arrhythmias or at baseline  Description: INTERVENTIONS:  - Continuous cardiac monitoring, monitor vital signs, obtain 12 lead EKG if indicated  - Evaluate effectiveness of antiarrhythmic and heart rate control medications as ordered  - Initiate emergency measures for life threatening arrhythmias  - Monitor electrolytes and administer replacement therapy as ordered  Outcome: Progressing     Problem: Safety Risk - Non-Violent Restraints  Goal: Patient will remain free from self-harm  Description: INTERVENTIONS:  - Apply the least restrictive restraint to prevent harm  - Notify patient and family of reasons restraints applied  - Assess for any contributing factors to confusion (electrolyte disturbances, delirium, medications)  - Discontinue any unnecessary medical devices as soon as possible  - Assess the patient's physical comfort, circulation, skin  condition, hydration, nutrition and elimination needs   - Reorient and redirection as needed  - Assess for the need to continue restraints  Outcome: Progressing

## 2023-12-30 NOTE — PROCEDURES
Continuous Video EEG report    REFERRING PHYSICIAN: Nat Varela MD    PCP and phone number:  NAT VARELA MD  [phone]    TECHNIQUE: 21 channels of EEG, 2 channels of EOG, and 1 channel of EKG were recorded utilizing the International 10/20 System. The recording was performed in a digitized monopolar referential format and playback was reformatted into various referential and bipolar montages utilizing appropriate filter settings. Automatic seizure and spike detection programs were utilized throughout the recording.  Video was recorded during the study    Total recording 24 hours    CLINICAL DATA:  Patient is sent for the evaluation of possible seizures.    MEDICATION:  Continuous Medications:   sodium chloride 75 mL/hr at 12/29/23 0521       Scheduled Medications:  No current outpatient medications on file.    PRN Medications:  metoprolol, adenosine, acetaminophen, acetaminophen, glucose **OR** glucose **OR** glucose-vitamin C **OR** dextrose **OR** glucose **OR** glucose **OR** glucose-vitamin C, LORazepam    ACTIVATION:  Hyperventilation: Not done  Photic stimulation: Not done  Sleep: Normal sleep architecture was seen.    BACKGROUND  While the patient was awake, the posterior dominant rhythm consisted of well-regulated 9-10 Hz rhythmic waveforms, symmetrically distributed over both posterior quadrants and was reactive to eye opening.    EEG ABNORMALITY  None    IMPRESSION:  This is a normal EEG. No focal, lateralized, or epileptiform features are noted. Clinical correlation required.

## 2023-12-30 NOTE — PLAN OF CARE
Problem: PAIN - ADULT  Goal: Verbalizes/displays adequate comfort level or patient's stated pain goal  Description: INTERVENTIONS:  - Encourage pt to monitor pain and request assistance  - Assess pain using appropriate pain scale  - Administer analgesics based on type and severity of pain and evaluate response  - Implement non-pharmacological measures as appropriate and evaluate response  - Consider cultural and social influences on pain and pain management  - Manage/alleviate anxiety  - Utilize distraction and/or relaxation techniques  - Monitor for opioid side effects  - Notify MD/LIP if interventions unsuccessful or patient reports new pain  - Anticipate increased pain with activity and pre-medicate as appropriate  Outcome: Progressing     Problem: NEUROLOGICAL - ADULT  Goal: Achieves stable or improved neurological status  Description: INTERVENTIONS  - Assess for and report changes in neurological status  - Initiate measures to prevent increased intracranial pressure  - Maintain blood pressure and fluid volume within ordered parameters to optimize cerebral perfusion and minimize risk of hemorrhage  - Monitor temperature, glucose, and sodium. Initiate appropriate interventions as ordered  Outcome: Not Progressing     Problem: Safety Risk - Non-Violent Restraints  Goal: Patient will remain free from self-harm  Description: INTERVENTIONS:  - Apply the least restrictive restraint to prevent harm  - Notify patient and family of reasons restraints applied  - Assess for any contributing factors to confusion (electrolyte disturbances, delirium, medications)  - Discontinue any unnecessary medical devices as soon as possible  - Assess the patient's physical comfort, circulation, skin condition, hydration, nutrition and elimination needs   - Reorient and redirection as needed  - Assess for the need to continue restraints  Outcome: Completed

## 2023-12-30 NOTE — PROGRESS NOTES
[facility] [place]  [address], [address]  [facility] [address]  [phone]          INPATIENT NEUROLOGY   FOLLOW UP CONSULT NOTE       [facility]    Report of Consultation    Ronn Blank Patient Status:  Inpatient     1950 MRN [id number]    Location [facility] 2W/SW Attending Oizel Sanders MD    Hosp Day # 2 PCP OZIEL ASNDERS MD      Date of Admission:  2023  Date of Consult Follow Up:  2023        INTERVAL HPI:   -EEG has been completed.  Patient is feeling much better.          ?PHYSICAL EXAM:   /83 (BP Location: Right arm)   Pulse 106   Temp 98.7 °F (37.1 °C)   Resp (!) 27   Ht 68\"   Wt 172 lb (78 kg)   SpO2 96%   BMI 26.15 kg/m²   General appearance: Well appearing, and in no acute distress  Skin: skin color, texture normal.  No rashes or lesions.    Head: Normocephalic, atraumatic.    Neurological exam:  Mental Status: Alert, oriented to situation, following commands, no aphasia  CN: EOMI, no facial droop or dysarthria  Motor: 5 UE      LABS/DATA:         HGBA1C:    Lab Results   Component Value Date    A1C 6.5 (H) 2023     (H) 2023              IMAGING:  XR CHEST AP PORTABLE  (CPT=71045)    Result Date: 2023  CONCLUSION:  1. Patchy right greater than left basilar opacities are new or more conspicuous since previous day chest radiograph.  Findings are concerning for pneumonia/aspiration and radiographic follow-up to resolution is advised. 2. Subtle spiculated right upper lobe nodule is suspected and better assessed on previous day chest CT.   Dictated by (CST): Mukesh Pierre MD on 2023 at 8:07 PM     Finalized by (CST): Mukesh Pierre MD on 2023 at 8:08 PM          CT CHEST PE AORTA (IV ONLY) (CPT=71260)    Result Date: 2023  CONCLUSION:   No PE  9 x 5.5 centimeter area of ground-glass opacity containing small internal areas of pulmonary consolidation in the periphery of the  right lower lobe most likely representing pulmonary infection.  There are small foci of airways inflammation/infection in the apical RUL  Spiculated 2.1 x 1.8 centimeter pulmonary nodule in the anterior segment RUL is concerning for primary pulmonary malignancy.  This is immediately adjacent to the anterior segmental bronchus and should be amenable to bronchoscopy  Mild diffuse intrathoracic adenopathy most pronounced at the right hilum and subcarinal space, indeterminate    Dictated by (CST): Ravindra Cross MD on 12/27/2023 at 8:58 AM     Finalized by (CST): Ravindra Cross MD on 12/27/2023 at 9:11 AM          CT SPINE CERVICAL (CPT=72125)    Result Date: 12/27/2023  CONCLUSION:  1. No acute fracture or posttraumatic malalignment cervical spine.  2. Multilevel degenerative changes are seen throughout the cervical spine.  3. Left greater than right subtotal mastoid effusion; correlation for relevant symptomatology is advised.  4. Partially visualized at least moderate centrilobular emphysematous disease.  5. Lesser incidental findings as above.    Dictated by (CST): Yuri Kang MD on 12/27/2023 at 7:36 AM     Finalized by (CST): Yuri Kang MD on 12/27/2023 at 7:39 AM          CT BRAIN OR HEAD (30727)    Result Date: 12/27/2023  CONCLUSION:  1. Negative for depressed calvarial fracture, coup/contrecoup intraparenchymal contusion, intracranial hemorrhage, or further evidence of acute intracranial process by noncontrast CT technique.  2. Senescent changes of parenchymal volume loss with sequela of chronic microvascular ischemic disease.  3. There is large vessel atherosclerosis involvement of the anterior and posterior intracranial circulations.  4. Lesser incidental findings as above.      Dictated by (CST): Yuri Kang MD on 12/27/2023 at 7:34 AM     Finalized by (CST): Yuri Kang MD on 12/27/2023 at 7:36 AM          XR CHEST AP PORTABLE  (CPT=71045)    Result Date: 12/27/2023  CONCLUSION:  Stable  chest demonstrating borderline cardiomegaly without radiographically evident acute intrathoracic process.    Dictated by (CST): Yuri Kang MD on 12/27/2023 at 7:14 AM     Finalized by (CST): Yuri Kang MD on 12/27/2023 at 7:15 AM              ASSESSMENT:  The patient is a 73 year old man with past medical history of hyperlipidemia and recent COVID infection continue with coughing, chronic fatigue and was found to have pneumonia.  Neurology was consulted for episodes of shivering due to concerns of for seizures.  Continuous EEG has been normal.  Likely these shivering episodes are nonepileptic and related to underlying medical illness (pneumonia).  He is much improved and likely his encephalopathy was toxic from his pneumonia.      Plan:  - MRI brain planned due to CT chest with possible malignancy - will follow peripherally until this is done   - Stop EEG    This note was prepared using Dragon Medical voice recognition dictation software and as a result, errors may occur. When identified, these errors have been corrected. While every attempt is made to correct errors during dictation, discrepancies may still exist    ANTONIETTA Mark DO   Staff Neurologist   12/30/2023  10:58 AM

## 2023-12-30 NOTE — PLAN OF CARE
Problem: Safety Risk - Non-Violent Restraints  Goal: Patient will remain free from self-harm  Description: INTERVENTIONS:  - Apply the least restrictive restraint to prevent harm  - Notify patient and family of reasons restraints applied  - Assess for any contributing factors to confusion (electrolyte disturbances, delirium, medications)  - Discontinue any unnecessary medical devices as soon as possible  - Assess the patient's physical comfort, circulation, skin condition, hydration, nutrition and elimination needs   - Reorient and redirection as needed  - Assess for the need to continue restraints  Outcome: Completed

## 2023-12-30 NOTE — PROGRESS NOTES
[facility]    Progress Note    Ronn Blank Patient Status:  Inpatient    1950 MRN [id number]   Location [facility] 2W/SW Attending Oziel Sanders MD   Hosp Day # 2 PCP OZIEL SANDERS MD         Subjective:     Constitutional:  Positive for fatigue. Negative for fever.   Respiratory:  Positive for cough. Negative for shortness of breath.    Cardiovascular: Negative.    Gastrointestinal: Negative.    Musculoskeletal: Negative.    Skin: Negative.    Neurological: Negative.    Hematological: Negative.    Psychiatric/Behavioral: Negative.       Seen and examined  Awake and alert and oriented x 4  Cough with occasional sputum  No chest pain or dyspnea or fever  No abdominal pain  No seizure  No focal weakness      Objective:   Blood pressure 129/83, pulse 106, temperature 98.7 °F (37.1 °C), resp. rate (!) 27, height 5' 8\" (1.727 m), weight 172 lb (78 kg), SpO2 96%.  Physical Exam  Constitutional:       General: He is not in acute distress.     Appearance: Normal appearance.   HENT:      Head: Normocephalic and atraumatic.      Nose: Nose normal.      Mouth/Throat:      Mouth: Mucous membranes are moist.   Eyes:      General: No scleral icterus.     Pupils: Pupils are equal, round, and reactive to light.   Cardiovascular:      Rate and Rhythm: Normal rate.      Heart sounds:      No gallop.   Pulmonary:      Effort: No respiratory distress.      Breath sounds: No stridor. Rales present. No wheezing.   Abdominal:      General: Abdomen is flat. Bowel sounds are normal.      Palpations: Abdomen is soft.      Tenderness: There is no guarding.   Musculoskeletal:      Left lower leg: No edema.   Skin:     General: Skin is dry.   Neurological:      General: No focal deficit present.      Mental Status: He is oriented to person, place, and time.         Results:   Lab Results   Component Value Date    WBC 8.4 2023    HGB 13.1 2023    HCT 38.7 (L) 2023    .0  12/29/2023    CREATSERUM 1.03 12/29/2023    BUN 16 12/29/2023     (L) 12/29/2023    K 3.7 12/29/2023    CL 99 12/29/2023    CO2 24.0 12/29/2023     (H) 12/29/2023    CA 8.8 12/29/2023    ALB 4.3 12/27/2023    ALKPHO 60 12/27/2023    BILT 0.8 12/27/2023    TP 7.5 12/27/2023    AST 39 (H) 12/27/2023    ALT 33 12/27/2023    T4F 0.81 06/08/2018    TSH 1.842 11/02/2023    PSA 1.0 06/08/2018    DDIMER 0.94 (H) 12/27/2023    TROPHS 10 12/27/2023     10/28/2022    B12 709 06/08/2018       XR CHEST AP PORTABLE  (CPT=71045)    Result Date: 12/28/2023  CONCLUSION:  1. Patchy right greater than left basilar opacities are new or more conspicuous since previous day chest radiograph.  Findings are concerning for pneumonia/aspiration and radiographic follow-up to resolution is advised. 2. Subtle spiculated right upper lobe nodule is suspected and better assessed on previous day chest CT.   Dictated by (CST): Mukesh Pierre MD on 12/28/2023 at 8:07 PM     Finalized by (CST): Mukesh Pierre MD on 12/28/2023 at 8:08 PM         EKG 12 Lead    Result Date: 12/30/2023  Supraventricular tachycardia with Premature ventricular complexes or Fusion complexes Nonspecific intraventricular conduction delay Nonspecific ST abnormality Abnormal ECG When compared with ECG of 29-DEC-2023 08:32, Fusion complexes are now Present Premature ventricular complexes are now Present The axis Shifted right ST now depressed in Anterior-lateral leads    EKG 12 Lead    Result Date: 12/29/2023  Sinus tachycardia Left axis deviation Inferior infarct (cited on or before 27-DEC-2023) Abnormal ECG When compared with ECG of 27-DEC-2023 05:36, No significant change was found Confirmed by WILFREDO CHASE ([id number]) on 12/29/2023 11:40:05 AM     Assessment & Plan:      1-acute pneumonia  Chest x-ray with basilar infiltrate mainly RLL   Chest CT with area of infiltrate and GGO mainly in the right lower lobe    Positive procalcitonin  Negative Legionella  and pneumococcal antigen  Continue course of antibiotics with Zosyn  Aspiration precaution    2-spiculated 2.1 cm lung nodule right upper lobe with mediastinal lymphadenopathy  Patient evaluated by Dr. Katz and recommended outpatient PET scan after treating now for pneumonia    3-acute respiratory failure with hypoxia  O2 therapy  Aspiration precautions    3-tremors and ?  Seizure with altered mental status  Mental status improved significantly  Neurology following and now on EEG    4-DVT prophylaxis  Lovenox subcu      Krystal Cote MD  12/30/2023

## 2023-12-30 NOTE — PROGRESS NOTES
Hand off given to Victor Hugo RUELAS. Patient transferred to room 511 on tele. Wife at bedside at time of transfer, aware. Patient pulled out left extended length IV prior to transfer. Will chart against. All belongings sent with patient.

## 2023-12-31 ENCOUNTER — APPOINTMENT (OUTPATIENT)
Dept: MRI IMAGING | Facility: HOSPITAL | Age: 73
End: 2023-12-31
Attending: INTERNAL MEDICINE
Payer: MEDICARE

## 2023-12-31 PROBLEM — R41.82 ALTERED MENTAL STATUS: Status: ACTIVE | Noted: 2023-12-31

## 2023-12-31 PROBLEM — G92.9 TOXIC ENCEPHALOPATHY: Status: ACTIVE | Noted: 2023-12-31

## 2023-12-31 LAB
ANION GAP SERPL CALC-SCNC: 8 MMOL/L (ref 0–18)
BASOPHILS # BLD AUTO: 0.03 X10(3) UL (ref 0–0.2)
BASOPHILS NFR BLD AUTO: 0.5 %
BUN BLD-MCNC: 16 MG/DL (ref 9–23)
BUN/CREAT SERPL: 20.3 (ref 10–20)
CALCIUM BLD-MCNC: 8.5 MG/DL (ref 8.7–10.4)
CHLORIDE SERPL-SCNC: 104 MMOL/L (ref 98–112)
CO2 SERPL-SCNC: 24 MMOL/L (ref 21–32)
CREAT BLD-MCNC: 0.79 MG/DL
DEPRECATED RDW RBC AUTO: 40 FL (ref 35.1–46.3)
EGFRCR SERPLBLD CKD-EPI 2021: 93 ML/MIN/1.73M2 (ref 60–?)
EOSINOPHIL # BLD AUTO: 0.09 X10(3) UL (ref 0–0.7)
EOSINOPHIL NFR BLD AUTO: 1.4 %
ERYTHROCYTE [DISTWIDTH] IN BLOOD BY AUTOMATED COUNT: 12.6 % (ref 11–15)
GLUCOSE BLD-MCNC: 153 MG/DL (ref 70–99)
GLUCOSE BLDC GLUCOMTR-MCNC: 111 MG/DL (ref 70–99)
GLUCOSE BLDC GLUCOMTR-MCNC: 140 MG/DL (ref 70–99)
HCT VFR BLD AUTO: 35.8 %
HGB BLD-MCNC: 11.9 G/DL
IMM GRANULOCYTES # BLD AUTO: 0.07 X10(3) UL (ref 0–1)
IMM GRANULOCYTES NFR BLD: 1.1 %
LYMPHOCYTES # BLD AUTO: 1.18 X10(3) UL (ref 1–4)
LYMPHOCYTES NFR BLD AUTO: 18.7 %
MCH RBC QN AUTO: 28.9 PG (ref 26–34)
MCHC RBC AUTO-ENTMCNC: 33.2 G/DL (ref 31–37)
MCV RBC AUTO: 86.9 FL
MONOCYTES # BLD AUTO: 0.71 X10(3) UL (ref 0.1–1)
MONOCYTES NFR BLD AUTO: 11.3 %
NEUTROPHILS # BLD AUTO: 4.22 X10 (3) UL (ref 1.5–7.7)
NEUTROPHILS # BLD AUTO: 4.22 X10(3) UL (ref 1.5–7.7)
NEUTROPHILS NFR BLD AUTO: 67 %
OSMOLALITY SERPL CALC.SUM OF ELEC: 286 MOSM/KG (ref 275–295)
PLATELET # BLD AUTO: 310 10(3)UL (ref 150–450)
POTASSIUM SERPL-SCNC: 3.4 MMOL/L (ref 3.5–5.1)
RBC # BLD AUTO: 4.12 X10(6)UL
SODIUM SERPL-SCNC: 136 MMOL/L (ref 136–145)
WBC # BLD AUTO: 6.3 X10(3) UL (ref 4–11)

## 2023-12-31 PROCEDURE — 99232 SBSQ HOSP IP/OBS MODERATE 35: CPT | Performed by: INTERNAL MEDICINE

## 2023-12-31 PROCEDURE — 99232 SBSQ HOSP IP/OBS MODERATE 35: CPT | Performed by: OTHER

## 2023-12-31 PROCEDURE — 70553 MRI BRAIN STEM W/O & W/DYE: CPT | Performed by: INTERNAL MEDICINE

## 2023-12-31 RX ORDER — GADOTERATE MEGLUMINE 376.9 MG/ML
15 INJECTION INTRAVENOUS
Status: COMPLETED | OUTPATIENT
Start: 2023-12-31 | End: 2023-12-31

## 2023-12-31 RX ORDER — LORAZEPAM 2 MG/ML
1 INJECTION INTRAMUSCULAR ONCE
Status: COMPLETED | OUTPATIENT
Start: 2023-12-31 | End: 2023-12-31

## 2023-12-31 RX ORDER — LORAZEPAM 2 MG/ML
1 INJECTION INTRAMUSCULAR ONCE
Status: DISCONTINUED | OUTPATIENT
Start: 2023-12-31 | End: 2024-01-04

## 2023-12-31 RX ORDER — POTASSIUM CHLORIDE 20 MEQ/1
40 TABLET, EXTENDED RELEASE ORAL ONCE
Status: COMPLETED | OUTPATIENT
Start: 2023-12-31 | End: 2023-12-31

## 2023-12-31 RX ORDER — DEXTROSE MONOHYDRATE 50 MG/ML
INJECTION, SOLUTION INTRAVENOUS CONTINUOUS
Status: DISCONTINUED | OUTPATIENT
Start: 2023-12-31 | End: 2024-01-01

## 2023-12-31 NOTE — PLAN OF CARE
Patient is A/0x1, history of being combative with staff, IV Zocyn started, patient wife at bedside, hearing aids charging, one large bowel movement upon arrival to the unit, call light in place.     Problem: SAFETY ADULT - FALL  Goal: Free from fall injury  Description: INTERVENTIONS:  - Assess pt frequently for physical needs  - Identify cognitive and physical deficits and behaviors that affect risk of falls.  - [place] fall precautions as indicated by assessment.  - Educate pt/family on patient safety including physical limitations  - Instruct pt to call for assistance with activity based on assessment  - Modify environment to reduce risk of injury  - Provide assistive devices as appropriate  - Consider OT/PT consult to assist with strengthening/mobility  - Encourage toileting schedule  Outcome: Progressing     Problem: NEUROLOGICAL - ADULT  Goal: Achieves stable or improved neurological status  Description: INTERVENTIONS  - Assess for and report changes in neurological status  - Initiate measures to prevent increased intracranial pressure  - Maintain blood pressure and fluid volume within ordered parameters to optimize cerebral perfusion and minimize risk of hemorrhage  - Monitor temperature, glucose, and sodium. Initiate appropriate interventions as ordered  Outcome: Progressing     Problem: PAIN - ADULT  Goal: Verbalizes/displays adequate comfort level or patient's stated pain goal  Description: INTERVENTIONS:  - Encourage pt to monitor pain and request assistance  - Assess pain using appropriate pain scale  - Administer analgesics based on type and severity of pain and evaluate response  - Implement non-pharmacological measures as appropriate and evaluate response  - Consider cultural and social influences on pain and pain management  - Manage/alleviate anxiety  - Utilize distraction and/or relaxation techniques  - Monitor for opioid side effects  - Notify MD/LIP if interventions unsuccessful or patient  reports new pain  - Anticipate increased pain with activity and pre-medicate as appropriate  Outcome: Progressing     Problem: RISK FOR INFECTION - ADULT  Goal: Absence of fever/infection during anticipated neutropenic period  Description: INTERVENTIONS  - Monitor WBC  - Administer growth factors as ordered  - Implement neutropenic guidelines  Outcome: Progressing     Problem: DISCHARGE PLANNING  Goal: Discharge to home or other facility with appropriate resources  Description: INTERVENTIONS:  - Identify barriers to discharge w/pt and caregiver  - Include patient/family/discharge partner in discharge planning  - Arrange for needed discharge resources and transportation as appropriate  - Identify discharge learning needs (meds, wound care, etc)  - Arrange for interpreters to assist at discharge as needed  - Consider post-discharge preferences of patient/family/discharge partner  - Complete POLST form as appropriate  - Assess patient's ability to be responsible for managing their own health  - Refer to Case Management Department for coordinating discharge planning if the patient needs post-hospital services based on physician/LIP order or complex needs related to functional status, cognitive ability or social support system  Outcome: Progressing     Problem: RESPIRATORY - ADULT  Goal: Achieves optimal ventilation and oxygenation  Description: INTERVENTIONS:  - Assess for changes in respiratory status  - Assess for changes in mentation and behavior  - Position to facilitate oxygenation and minimize respiratory effort  - Oxygen supplementation based on oxygen saturation or ABGs  - Provide Smoking Cessation handout, if applicable  - Encourage broncho-pulmonary hygiene including cough, deep breathe, Incentive Spirometry  - Assess the need for suctioning and perform as needed  - Assess and instruct to report SOB or any respiratory difficulty  - Respiratory Therapy support as indicated  - Manage/alleviate anxiety  - Monitor  for signs/symptoms of CO2 retention  Outcome: Progressing     Problem: Patient Centered Care  Goal: Patient preferences are identified and integrated in the patient's plan of care  Description: Interventions:  - What would you like us to know as we care for you? Patient is from home with his wife   - Provide timely, complete, and accurate information to patient/family  - Incorporate patient and family knowledge, values, beliefs, and cultural backgrounds into the planning and delivery of care  - Encourage patient/family to participate in care and decision-making at the level they choose  - Honor patient and family perspectives and choices  Outcome: Progressing     Problem: CARDIOVASCULAR - ADULT  Goal: Maintains optimal cardiac output and hemodynamic stability  Description: INTERVENTIONS:  - Monitor vital signs, rhythm, and trends  - Monitor for bleeding, hypotension and signs of decreased cardiac output  - Evaluate effectiveness of vasoactive medications to optimize hemodynamic stability  - Monitor arterial and/or venous puncture sites for bleeding and/or hematoma  - Assess quality of pulses, skin color and temperature  - Assess for signs of decreased coronary artery perfusion - ex. Angina  - Evaluate fluid balance, assess for edema, trend weights  Outcome: Progressing  Goal: Absence of cardiac arrhythmias or at baseline  Description: INTERVENTIONS:  - Continuous cardiac monitoring, monitor vital signs, obtain 12 lead EKG if indicated  - Evaluate effectiveness of antiarrhythmic and heart rate control medications as ordered  - Initiate emergency measures for life threatening arrhythmias  - Monitor electrolytes and administer replacement therapy as ordered  Outcome: Progressing

## 2023-12-31 NOTE — OCCUPATIONAL THERAPY NOTE
OCCUPATIONAL THERAPY EVALUATION - INPATIENT     Room Number: 511/511-A  Evaluation Date: 12/31/2023  Type of Evaluation: Initial  Presenting Problem: Syncope; weakness    Physician Order: IP Consult to Occupational Therapy  Reason for Therapy: ADL/IADL Dysfunction and Discharge Planning    OCCUPATIONAL THERAPY ASSESSMENT   Patient is a 73 year old male admitted 12/27/2023 for syncope; weakness; falls.  In this OT evaluation patient presents with the following impairments: cognition, safety, balance, mobility, activity tolerance, endurance, deconditoining.  These deficits manifest functionally while performing ADL.   The patient is below baseline and would benefit from skilled inpatient OT to address the above deficits, maximizing patient's ability to return to prior level of function.    The patient's Approx Degree of Impairment: 59.67% has been calculated based on documentation in the [facility] '6 clicks' Inpatient Daily Activity Short Form.  Research supports that patients with this level of impairment may benefit from ANURAG; pt is on BL wrist restraints; disoriented and confused; can follow basic 1-2 step commands; MD requested pt remain in bed on BL wrist restraints at end of session; pt requiring assist for all ADLs; declining self feed; Mod A x2 to transition to EOB; Min A for sitting balance; MAx A to don socks; stood with hand held assist Mod A x2; able to complete lateral weight shifts and side step to head of bed; visibly fatigued with minimal exertion; returned pt to bed; reapplied restraints and bed alarm set; pt is below his functional baseline; at risk for further deconditioning and falls; pt will benefit from gradual approach to rehabilitation; Continue skilled occupational therapy while IP to maximize patient function and increase patient participation, safety, and independence with basic ADL and everyday activities.   .    DISCHARGE RECOMMENDATIONS  OT Discharge Recommendations: Sub-acute  rehabilitation  OT Device Recommendations: TBD    PLAN  OT Treatment Plan: Balance activities;Energy conservation/work simplification techniques;ADL training;Functional transfer training;Endurance training;Patient/Family education;Equipment eval/education;Patient/Family training;Compensatory technique education       OCCUPATIONAL THERAPY MEDICAL/SOCIAL HISTORY   Problem List   Principal Problem:    Syncope, near  Active Problems:    Hyponatremia    Dehydration    Shivering    HOME SITUATION  Type of Home: House  Home Layout: Two level  Lives With: Spouse  Patient Regularly Uses: Glasses  3  Prior Level of [place]: Per family in room; pt ambulatory with SPC, manages self care at Mod I level    SUBJECTIVE  Im in the office     OCCUPATIONAL THERAPY EXAMINATION   OBJECTIVE  Precautions: Bed/chair alarm; Restraints  Fall Risk: High fall risk    WEIGHT BEARING RESTRICTION     PAIN ASSESSMENT  [place]    ACTIVITY TOLERANCE  Pulse: 106        BP: 143/87  BP Location: Left arm  BP Method: Automatic  Patient Position: Lying    O2 SATURATIONS  Oxygen Therapy  SPO2% on Oxygen at Rest: 95  At rest oxygen flow (liters per minute): 3    COGNITION  Overall Cognitive Status:  Impaired    RANGE OF MOTION   Upper extremity ROM is within functional limits     STRENGTH ASSESSMENT  Upper extremity strength is within functional limits     ACTIVITIES OF DAILY LIVING ASSESSMENT  AM-PAC ‘6-Clicks’ Inpatient Daily Activity Short Form  How much help from another person does the patient currently need…  -   Putting on and taking off regular lower body clothing?: A Lot  -   Bathing (including washing, rinsing, drying)?: A Lot  -   Toileting, which includes using toilet, bedpan or urinal? : A Lot  -   Putting on and taking off regular upper body clothing?: A Lot  -   Taking care of personal grooming such as brushing teeth?: A Little  -   Eating meals?: A Little    AM-PAC Score:  Score: 14  Approx Degree of Impairment: 59.67%  Standardized  Score (AM-PAC Scale): 33.39  CMS Modifier (G-Code): CK    FUNCTIONAL TRANSFER ASSESSMENT  Sit to Stand: Edge of Bed  Edge of Bed: Moderate Assist    BED MOBILITY  Rolling: Moderate Assist (x2)  Supine to Sit : Moderate Assist (x2)  Sit to Supine (OT): Moderate Assist (x2)    BALANCE ASSESSMENT  Static Sitting: Minimal Assist  Static Standing: Moderate Assist (x2)    FUNCTIONAL ADL ASSESSMENT  Eating: Supervision  Grooming Seated: Minimal Assist  Bathing Seated: Moderate Assist  UB Dressing Seated: Minimal Assist  LB Dressing Seated: Maximum Assist  Toileting Seated: Maximum Assist      EDUCATION PROVIDED  Patient : Role of Occupational Therapy; Discharge Recommendations; Plan of Care; Functional Transfer Techniques; Posture/Positioning; Compensatory ADL Techniques  Patient's Response to Education: Verbalized Understanding; Requires Further Education    Patient End of Session: In bed;Needs met;Call light within reach;RN aware of session/findings;All patient questions and concerns addressed;Alarm set    OT Goals  Patient self-stated goal is: to return home     Patient will complete LE dressing with SBA  Comment:     Patient will complete toilet transfer with SBA   Comment:     Patient will complete self care task at sink level with SBA    Comment:     Comment:         Goals  on: 1/15  Frequency:3-5x week     Patient Evaluation Complexity Level:   Occupational Profile/Medical History MODERATE - Expanded review of history including review of medical or therapy record   Specific performance deficits impacting engagement in ADL/IADL MODERATE  3 - 5 performance deficits   Client Assessment/Performance Deficits MODERATE - Comorbidities and min to mod modifications of tasks    Clinical Decision Making MODERATE - Analysis of occupational profile, detailed assessments, several treatment options    Overall Complexity MODERATE     OT Session Time: 15 minutes  Self-Care Home Management: 15 minutes      Suhail Means  Occupational Therapist, OTR/L ext [phone]

## 2023-12-31 NOTE — PROGRESS NOTES
[facility]    Progress Note    Ronn Blank Patient Status:  Inpatient    1950 MRN [id number]   Location [facility] 2W/SW Attending Oziel Sanders MD   Hosp Day # 3 PCP OZIEL SANDERS MD     Chief Complaint:     Subjective:     Unable to perform ROS  Skin: Negative.        Objective:   Blood pressure 127/84, pulse 107, temperature 97 °F (36.1 °C), temperature source Axillary, resp. rate 18, height 5' 8\" (1.727 m), weight 179 lb (81.2 kg), SpO2 90%.  Physical Exam  Constitutional:       Appearance: Normal appearance.   HENT:      Head: Normocephalic and atraumatic.   Cardiovascular:      Rate and Rhythm: Normal rate and regular rhythm.      Pulses: Normal pulses.      Heart sounds: Normal heart sounds.   Pulmonary:      Effort: Pulmonary effort is normal.      Breath sounds: Normal breath sounds.   Abdominal:      Palpations: Abdomen is soft.   Musculoskeletal:      Cervical back: Normal range of motion and neck supple.   Skin:     General: Skin is warm.   Neurological:      Mental Status: He is alert.      Comments: Confused         Results:   Lab Results   Component Value Date    WBC 6.3 2023    HGB 11.9 (L) 2023    HCT 35.8 (L) 2023    .0 2023    CREATSERUM 0.79 2023    BUN 16 2023     2023    K 3.4 (L) 2023     2023    CO2 24.0 2023     (H) 2023    CA 8.5 (L) 2023    ALB 4.3 2023    ALKPHO 60 2023    BILT 0.8 2023    TP 7.5 2023    AST 39 (H) 2023    ALT 33 2023    T4F 0.81 2018    TSH 1.842 2023    PSA 1.0 2018    DDIMER 0.94 (H) 2023    TROPHS 10 2023     10/28/2022    B12 709 2018       No results found.  EKG 12 Lead    Result Date: 2023  Supraventricular tachycardia with Premature ventricular complexes or Fusion complexes Nonspecific intraventricular conduction delay Nonspecific ST  abnormality Abnormal ECG When compared with ECG of 29-DEC-2023 08:32, Fusion complexes are now Present Premature ventricular complexes are now Present The axis Shifted right ST now depressed in Anterior-lateral leads Confirmed by SEAN COHEN STEVEN ([id number]) on 12/30/2023 1:46:14 PM     Assessment & Plan:        *Fever/Tremors  RRT called 12/28 for shivering/tremor like activities  Transferred to CCU  Continuous EEG did not show any seizure, pending MRI, he seems more confused they had to restrain him overnight    Syncope, near  CT spine and brain negative for acute process  Troponin negative   Fall precautions  Monitor          *AMS  ?secondary to pneumonia vs. Seizure+ delirium  Neurology following      *Pulmonary consolidation   CT chest: ground glass opacity and pulmonary consolidation most likely representing pulmonary infection  No PE  D-dimer 0.94  Now febrile with TMAX 100.8F today   Procal 0.37  On 2L O2  Cont rocephin and Zithromax   Pulmonology  is following      *Pulmonary malignancy-suspicious per CT  No PE  CT chest: 2.1 x 1.8 cm pulmonary nodule concerning for primary pulmonary malignancy      *HLD  Cont rosuvastatin         HAMLET STEVENSON MD  12/31/2023    Discussed with his wife

## 2023-12-31 NOTE — PHYSICAL THERAPY NOTE
PHYSICAL THERAPY EVALUATION - INPATIENT     Room Number: 511/511-A  Evaluation Date: 12/31/2023  Type of Evaluation: Initial   Physician Order: PT Eval and Treat    Presenting Problem: syncope-fall out of bed, increased confusion  Co-Morbidities : recent hx of COVID  Reason for Therapy: Mobility Dysfunction and Discharge Planning    PHYSICAL THERAPY ASSESSMENT     Patient is a 73 year old male admitted 12/27/2023 for syncope- fall out of bed with increased confusion. CT brain (-) acute findings. MRI brain (pending).  Pulmonary malignancy suspicious per CT per MD. Recent history of COVID  Patient's current functional deficits include impaired participation due to confusion, impaired bed mobility, transfers, ambulation, balance, strength and stair negotiation, which are below the patient's pre-admission status.  Patient received supine in bed with family present. RN approved activity. Coordinated session with OT. Therapist educated pt on POC and physiological benefits of mobilization A&O x 1 to self only. Distracted and fidgety.  Follows 1 step commands with increased time. Denies pain. MD in room during session and requesting pt return to supine with soft wrist restraints re-donned.   Bed mobility: Min supine>sit. Mod Sit>supine. Assist with LE's and trunk. Task requiring increased time to complete 2* difficulty with sequencing. Sat EOB for 5 minutes requiring SBA to maintain sitting balance.   Transfers: Mod A x 1 with HHA and gait belt. Tolerated 2 minutes of static standing with Min A x 1 for balance. Mod A x 1 for balance with marching in place attempts.   Ambulation: Mod A x 1 side step toward HOB. HHA. Noted dyspnea with exertion.   *SpO2 on 3L/min with activity: 95%. HR: 106-116bpm     Patient supine in bed at end of session with needs in reach, soft wrist restraints redonned and bed alarm activated.       The patient's Approx Degree of Impairment: 64.91% has been calculated based on documentation in the  [facility] '6 clicks' Inpatient Basic Mobility Short Form.  Research supports that patients with this level of impairment may benefit from sub-acute rehab to optimize functional outcomes.    Patient will benefit from continued IP PT services to address these deficits in preparation for discharge.    DISCHARGE RECOMMENDATIONS  PT Discharge Recommendations: Sub-acute rehabilitation    PLAN  PT Treatment Plan: Bed mobility;Body mechanics;Endurance;Energy conservation;Patient education;Family education;Gait training;Strengthening;Range of motion;Balance training;Transfer training  Rehab Potential : Fair  Frequency (Obs): 3-5x/week       PHYSICAL THERAPY MEDICAL/SOCIAL HISTORY     Problem List  Principal Problem:    Syncope, near  Active Problems:    Hyponatremia    Dehydration    Shivering    Altered mental status      HOME SITUATION  Home Situation  Type of Home: House  Home Layout: Two level  Stairs to Bedroom: 8  Railing: Yes  Lives With: Spouse  Patient Owned Equipment: Cane  Patient Regularly Uses: Glasses     Prior Level of [place]: Patient is a poor historian- per family present in room, prior to admission, pt was Ind in ADL's and IADL's +driving. Ambulates with cane.     SUBJECTIVE  \"I want to get far away from here\"     PHYSICAL THERAPY EXAMINATION     OBJECTIVE  Precautions: Bed/chair alarm;Restraints  Fall Risk: High fall risk    WEIGHT BEARING RESTRICTION  Weight Bearing Restriction: None                PAIN ASSESSMENT  Ratin          COGNITION  Overall Cognitive Status:  Impaired  Perseveration: perseverates during conversation  Safety Judgement:  decreased awareness of need for safety  Awareness of Errors:  decreased awareness of errors   Awareness of Deficits:  decreased awareness of deficits    RANGE OF MOTION AND STRENGTH ASSESSMENT  Upper extremity ROM and strength are within functional limits   Lower extremity ROM is within functional limits   Lower extremity strength is impaired- unable to  formally test 2* impaired command following    BALANCE  Static Sitting: Good  Dynamic Sitting: Fair +  Static Standing: Fair -  Dynamic Standing: Poor +      ACTIVITY TOLERANCE  Pulse: 106 (116bpm w activity)  Heart Rate Source: Monitor     BP: 143/87 (145/89mmHg sitting EOB)  BP Location: Left arm  BP Method: Automatic  Patient Position: Lying    O2 WALK  Oxygen Therapy  SPO2% on Oxygen at Rest: 95  At rest oxygen flow (liters per minute): 3    AM-PAC '6-Clicks' INPATIENT SHORT FORM - BASIC MOBILITY  How much difficulty does the patient currently have...  Patient Difficulty: Turning over in bed (including adjusting bedclothes, sheets and blankets)?: A Little   Patient Difficulty: Sitting down on and standing up from a chair with arms (e.g., wheelchair, bedside commode, etc.): A Lot   Patient Difficulty: Moving from lying on back to sitting on the side of the bed?: A Lot   How much help from another person does the patient currently need...   Help from Another: Moving to and from a bed to a chair (including a wheelchair)?: A Lot   Help from Another: Need to walk in hospital room?: A Lot   Help from Another: Climbing 3-5 steps with a railing?: A Lot     AM-PAC Score:  Raw Score: 13   Approx Degree of Impairment: 64.91%   Standardized Score (AM-PAC Scale): 36.74   CMS Modifier (G-Code): CL    FUNCTIONAL ABILITY STATUS  Functional Mobility/Gait Assessment  Gait Assistance: Moderate assistance  Distance (ft): 3 side steps toward HOB  Assistive Device:  (HHA)  Pattern: Shuffle      Exercise/Education Provided:  Bed mobility  Body mechanics  Energy conservation  Functional activity tolerated  Gait training  Posture  ROM  Strengthening  Transfer training    Patient End of Session: In bed;Needs met;Call light within reach;RN aware of session/findings;Restraints;Alarm set;Family present    CURRENT GOALS    Goals to be met by: 1/14/24  Patient Goal Patient's self-stated goal is: did not describe   Goal #1 Patient is able to  demonstrate supine - sit EOB @ level: Supervision     Goal #1   Current Status    Goal #2 Patient is able to demonstrate transfers Sit to/from Stand at assistance level: supervision with walker - rolling     Goal #2  Current Status    Goal #3 Patient is able to ambulate 150 feet with assist device: walker - rolling at assistance level: CG   Goal #3   Current Status    Goal #4 Patient will negotiate 5 stairs/one curb w/ assistive device and supervision   Goal #4   Current Status    Goal #5 Patient to demonstrate independence with home activity/exercise instructions provided to patient in preparation for discharge.   Goal #5   Current Status      Patient Evaluation Complexity Level:  History Moderate - 1 or 2 personal factors and/or co-morbidities   Examination of body systems Moderate - addressing a total of 3 or more elements   Clinical Presentation Moderate - Evolving   Clinical Decision Making Moderate Complexity     Therapeutic Activity: 15 minutes

## 2023-12-31 NOTE — PROGRESS NOTES
[facility]    Progress Note    Ronn Blank Patient Status:  Inpatient    1950 MRN [id number]   Location [facility]5W Attending Oziel Sanders MD   Hosp Day # 3 PCP OZIEL SANDERS MD         Subjective:   Subjective:    Seen and examined  No significant cough  Overall poor historian reported episode of agitation  Comfortable on 2 L of oxygen    Objective:   Blood pressure 147/87, pulse 102, temperature 97.2 °F (36.2 °C), temperature source Axillary, resp. rate 20, height 5' 8\" (1.727 m), weight 179 lb (81.2 kg), SpO2 93%.  Physical Exam  Constitutional:       General: He is not in acute distress.  HENT:      Head: Atraumatic.      Mouth/Throat:      Mouth: Mucous membranes are moist.   Eyes:      General: No scleral icterus.  Cardiovascular:      Rate and Rhythm: Normal rate.   Pulmonary:      Comments: Good air exchange to both lungs  Mild basilar rales  No wheezes or rhonchi  Abdominal:      General: Abdomen is flat. Bowel sounds are normal.      Palpations: Abdomen is soft.   Musculoskeletal:      Right lower leg: No edema.      Left lower leg: No edema.   Skin:     General: Skin is dry.   Neurological:      Mental Status: Mental status is at baseline.         Results:   Lab Results   Component Value Date    WBC 6.3 2023    HGB 11.9 (L) 2023    HCT 35.8 (L) 2023    .0 2023    CREATSERUM 0.79 2023    BUN 16 2023     2023    K 3.4 (L) 2023     2023    CO2 24.0 2023     (H) 2023    CA 8.5 (L) 2023    ALB 4.3 2023    ALKPHO 60 2023    BILT 0.8 2023    TP 7.5 2023    AST 39 (H) 2023    ALT 33 2023    T4F 0.81 2018    TSH 1.842 2023    PSA 1.0 2018    DDIMER 0.94 (H) 2023    TROPHS 10 2023     10/28/2022    B12 709 2018       No results found.  EKG 12 Lead    Result Date: 2023  Supraventricular  tachycardia with Premature ventricular complexes or Fusion complexes Nonspecific intraventricular conduction delay Nonspecific ST abnormality Abnormal ECG When compared with ECG of 29-DEC-2023 08:32, Fusion complexes are now Present Premature ventricular complexes are now Present The axis Shifted right ST now depressed in Anterior-lateral leads Confirmed by SEAN COHEN STEVEN ([id number]) on 12/30/2023 1:46:14 PM     Assessment & Plan:      1-acute pneumonia / RLL    Positive procalcitonin  Negative Legionella and pneumococcal antigen  Continue course of antibiotics with Zosyn  Aspiration precaution     2-spiculated 2.1 cm lung nodule right upper lobe with mediastinal lymphadenopathy  Patient evaluated by Dr. Katz and recommended outpatient PET scan after treating now for pneumonia     3-acute respiratory failure with hypoxia  O2 therapy  Aspiration precautions    4-suspected AMAN  Outpatient sleep study  Will try empiric CPAP if patient comply .     5-tremors and ?  Seizure with altered mental status  Mental status improved significantly  Neurology following and now on EEG     6-DVT prophylaxis  Lovenox subcu              Krystal Cote MD  12/31/2023

## 2023-12-31 NOTE — PLAN OF CARE
Problem: SAFETY ADULT - FALL  Goal: Free from fall injury  Description: INTERVENTIONS:  - Assess pt frequently for physical needs  - Identify cognitive and physical deficits and behaviors that affect risk of falls.  - [place] fall precautions as indicated by assessment.  - Educate pt/family on patient safety including physical limitations  - Instruct pt to call for assistance with activity based on assessment  - Modify environment to reduce risk of injury  - Provide assistive devices as appropriate  - Consider OT/PT consult to assist with strengthening/mobility  - Encourage toileting schedule  Outcome: Progressing     Problem: NEUROLOGICAL - ADULT  Goal: Achieves stable or improved neurological status  Description: INTERVENTIONS  - Assess for and report changes in neurological status  - Initiate measures to prevent increased intracranial pressure  - Maintain blood pressure and fluid volume within ordered parameters to optimize cerebral perfusion and minimize risk of hemorrhage  - Monitor temperature, glucose, and sodium. Initiate appropriate interventions as ordered  Outcome: Progressing     Problem: PAIN - ADULT  Goal: Verbalizes/displays adequate comfort level or patient's stated pain goal  Description: INTERVENTIONS:  - Encourage pt to monitor pain and request assistance  - Assess pain using appropriate pain scale  - Administer analgesics based on type and severity of pain and evaluate response  - Implement non-pharmacological measures as appropriate and evaluate response  - Consider cultural and social influences on pain and pain management  - Manage/alleviate anxiety  - Utilize distraction and/or relaxation techniques  - Monitor for opioid side effects  - Notify MD/LIP if interventions unsuccessful or patient reports new pain  - Anticipate increased pain with activity and pre-medicate as appropriate  Outcome: Progressing     Problem: RISK FOR INFECTION - ADULT  Goal: Absence of fever/infection during  anticipated neutropenic period  Description: INTERVENTIONS  - Monitor WBC  - Administer growth factors as ordered  - Implement neutropenic guidelines  Outcome: Progressing     Problem: DISCHARGE PLANNING  Goal: Discharge to home or other facility with appropriate resources  Description: INTERVENTIONS:  - Identify barriers to discharge w/pt and caregiver  - Include patient/family/discharge partner in discharge planning  - Arrange for needed discharge resources and transportation as appropriate  - Identify discharge learning needs (meds, wound care, etc)  - Arrange for interpreters to assist at discharge as needed  - Consider post-discharge preferences of patient/family/discharge partner  - Complete POLST form as appropriate  - Assess patient's ability to be responsible for managing their own health  - Refer to Case Management Department for coordinating discharge planning if the patient needs post-hospital services based on physician/LIP order or complex needs related to functional status, cognitive ability or social support system  Outcome: Progressing     Problem: RESPIRATORY - ADULT  Goal: Achieves optimal ventilation and oxygenation  Description: INTERVENTIONS:  - Assess for changes in respiratory status  - Assess for changes in mentation and behavior  - Position to facilitate oxygenation and minimize respiratory effort  - Oxygen supplementation based on oxygen saturation or ABGs  - Provide Smoking Cessation handout, if applicable  - Encourage broncho-pulmonary hygiene including cough, deep breathe, Incentive Spirometry  - Assess the need for suctioning and perform as needed  - Assess and instruct to report SOB or any respiratory difficulty  - Respiratory Therapy support as indicated  - Manage/alleviate anxiety  - Monitor for signs/symptoms of CO2 retention  Outcome: Progressing     Problem: Patient Centered Care  Goal: Patient preferences are identified and integrated in the patient's plan of care  Description:  Interventions:  - What would you like us to know as we care for you?   - Provide timely, complete, and accurate information to patient/family  - Incorporate patient and family knowledge, values, beliefs, and cultural backgrounds into the planning and delivery of care  - Encourage patient/family to participate in care and decision-making at the level they choose  - Honor patient and family perspectives and choices  Outcome: Progressing     Problem: CARDIOVASCULAR - ADULT  Goal: Maintains optimal cardiac output and hemodynamic stability  Description: INTERVENTIONS:  - Monitor vital signs, rhythm, and trends  - Monitor for bleeding, hypotension and signs of decreased cardiac output  - Evaluate effectiveness of vasoactive medications to optimize hemodynamic stability  - Monitor arterial and/or venous puncture sites for bleeding and/or hematoma  - Assess quality of pulses, skin color and temperature  - Assess for signs of decreased coronary artery perfusion - ex. Angina  - Evaluate fluid balance, assess for edema, trend weights  Outcome: Progressing  Goal: Absence of cardiac arrhythmias or at baseline  Description: INTERVENTIONS:  - Continuous cardiac monitoring, monitor vital signs, obtain 12 lead EKG if indicated  - Evaluate effectiveness of antiarrhythmic and heart rate control medications as ordered  - Initiate emergency measures for life threatening arrhythmias  - Monitor electrolytes and administer replacement therapy as ordered  Outcome: Progressing     Problem: Safety Risk - Non-Violent Restraints  Goal: Patient will remain free from self-harm  Description: INTERVENTIONS:  - Apply the least restrictive restraint to prevent harm  - Notify patient and family of reasons restraints applied  - Assess for any contributing factors to confusion (electrolyte disturbances, delirium, medications)  - Discontinue any unnecessary medical devices as soon as possible  - Assess the patient's physical comfort, circulation, skin  condition, hydration, nutrition and elimination needs   - Reorient and redirection as needed  - Assess for the need to continue restraints  Outcome: Progressing

## 2023-12-31 NOTE — PROGRESS NOTES
[facility] [address], [address]  [facility] [address]  [phone]          INPATIENT NEUROLOGY   FOLLOW UP CONSULT NOTE       [facility]    Report of Consultation    Ronn Blank Patient Status:  Inpatient     1950 MRN [id number]    Location [facility]5W Attending Oziel Sanders MD    Hosp Day # 3 PCP OZIEL SANDERS MD      Date of Admission:  2023  Date of Consult Follow Up:  2023        INTERVAL HPI:   -Patient has been waxing and waning with agitation.         ?PHYSICAL EXAM:     /87 (BP Location: Left arm)   Pulse 102   Temp 97.2 °F (36.2 °C) (Axillary)   Resp 20   Ht 68\"   Wt 179 lb (81.2 kg)   SpO2 93%   BMI 27.22 kg/m²   General appearance: Well appearing, and in no acute distress  Skin: skin color, texture normal.  No rashes or lesions.    Head: Normocephalic, atraumatic.    Neurological exam:  Mental Status: Alert but not answering questions appropriately, not following commands, squeezes hands reflexively but does not let go to command or protrude tongue, says he is cold       LABS/DATA:    Lab Results   Component Value Date    WBC 6.3 2023    HGB 11.9 2023    HCT 35.8 2023    .0 2023    CREATSERUM 0.79 2023    BUN 16 2023     2023    K 3.4 2023     2023    CO2 24.0 2023     2023    CA 8.5 2023       HGBA1C:    Lab Results   Component Value Date    A1C 6.5 (H) 2023     (H) 2023              IMAGING:  XR CHEST AP PORTABLE  (CPT=71045)    Result Date: 2023  CONCLUSION:  1. Patchy right greater than left basilar opacities are new or more conspicuous since previous day chest radiograph.  Findings are concerning for pneumonia/aspiration and radiographic follow-up to resolution is advised. 2. Subtle spiculated right upper lobe nodule is suspected and better assessed on previous day chest CT.    Dictated by (CST): Mukesh Pierre MD on 12/28/2023 at 8:07 PM     Finalized by (CST): Mukesh Pierre MD on 12/28/2023 at 8:08 PM          CT CHEST PE AORTA (IV ONLY) (CPT=71260)    Result Date: 12/27/2023  CONCLUSION:   No PE  9 x 5.5 centimeter area of ground-glass opacity containing small internal areas of pulmonary consolidation in the periphery of the right lower lobe most likely representing pulmonary infection.  There are small foci of airways inflammation/infection in the apical RUL  Spiculated 2.1 x 1.8 centimeter pulmonary nodule in the anterior segment RUL is concerning for primary pulmonary malignancy.  This is immediately adjacent to the anterior segmental bronchus and should be amenable to bronchoscopy  Mild diffuse intrathoracic adenopathy most pronounced at the right hilum and subcarinal space, indeterminate    Dictated by (CST): Ravindra Cross MD on 12/27/2023 at 8:58 AM     Finalized by (CST): Ravindra Cross MD on 12/27/2023 at 9:11 AM          CT SPINE CERVICAL (CPT=72125)    Result Date: 12/27/2023  CONCLUSION:  1. No acute fracture or posttraumatic malalignment cervical spine.  2. Multilevel degenerative changes are seen throughout the cervical spine.  3. Left greater than right subtotal mastoid effusion; correlation for relevant symptomatology is advised.  4. Partially visualized at least moderate centrilobular emphysematous disease.  5. Lesser incidental findings as above.    Dictated by (CST): Yuri Kang MD on 12/27/2023 at 7:36 AM     Finalized by (CST): Yuri Kang MD on 12/27/2023 at 7:39 AM          CT BRAIN OR HEAD (66987)    Result Date: 12/27/2023  CONCLUSION:  1. Negative for depressed calvarial fracture, coup/contrecoup intraparenchymal contusion, intracranial hemorrhage, or further evidence of acute intracranial process by noncontrast CT technique.  2. Senescent changes of parenchymal volume loss with sequela of chronic microvascular ischemic disease.  3. There is large  vessel atherosclerosis involvement of the anterior and posterior intracranial circulations.  4. Lesser incidental findings as above.      Dictated by (CST): Yuri Kang MD on 12/27/2023 at 7:34 AM     Finalized by (CST): Yuri Kang MD on 12/27/2023 at 7:36 AM          XR CHEST AP PORTABLE  (CPT=71045)    Result Date: 12/27/2023  CONCLUSION:  Stable chest demonstrating borderline cardiomegaly without radiographically evident acute intrathoracic process.    Dictated by (CST): Yuri Kang MD on 12/27/2023 at 7:14 AM     Finalized by (CST): Yuri Kang MD on 12/27/2023 at 7:15 AM              ASSESSMENT:  The patient is a 73 year old  man with past medical history of hyperlipidemia and recent COVID infection continue with coughing, chronic fatigue and was found to have pneumonia.  Neurology was consulted for episodes of shivering due to concerns of for seizures.  Continuous EEG has been normal.  Likely these shivering episodes are nonepileptic and related to underlying medical illness (pneumonia).  He is much improved and likely his encephalopathy was toxic from his pneumonia.       Toxic encephalopathy   - MRI brain - change to stat     Delirium - waxing and waning with agitation  -Consult to psychiatry recommended       This note was prepared using Dragon Medical voice recognition dictation software and as a result, errors may occur. When identified, these errors have been corrected. While every attempt is made to correct errors during dictation, discrepancies may still exist    ANTONIETTA Mark DO   Staff Neurologist   12/31/2023  3:34 PM

## 2024-01-01 LAB
GLUCOSE BLDC GLUCOMTR-MCNC: 122 MG/DL (ref 70–99)
GLUCOSE BLDC GLUCOMTR-MCNC: 134 MG/DL (ref 70–99)
GLUCOSE BLDC GLUCOMTR-MCNC: 139 MG/DL (ref 70–99)
GLUCOSE BLDC GLUCOMTR-MCNC: 141 MG/DL (ref 70–99)
GLUCOSE BLDC GLUCOMTR-MCNC: 156 MG/DL (ref 70–99)
POTASSIUM SERPL-SCNC: 3.9 MMOL/L (ref 3.5–5.1)

## 2024-01-01 PROCEDURE — 99232 SBSQ HOSP IP/OBS MODERATE 35: CPT | Performed by: INTERNAL MEDICINE

## 2024-01-01 PROCEDURE — 99232 SBSQ HOSP IP/OBS MODERATE 35: CPT | Performed by: OTHER

## 2024-01-01 NOTE — PLAN OF CARE
Restraints reordered per MD. On CPAP throughout the night. On tele. Male purewick placed. No other acute changes noted. Safety plan in place with frequent rounding.     Problem: SAFETY ADULT - FALL  Goal: Free from fall injury  Description: INTERVENTIONS:  - Assess pt frequently for physical needs  - Identify cognitive and physical deficits and behaviors that affect risk of falls.  - Kegley fall precautions as indicated by assessment.  - Educate pt/family on patient safety including physical limitations  - Instruct pt to call for assistance with activity based on assessment  - Modify environment to reduce risk of injury  - Provide assistive devices as appropriate  - Consider OT/PT consult to assist with strengthening/mobility  - Encourage toileting schedule  Outcome: Progressing     Problem: NEUROLOGICAL - ADULT  Goal: Achieves stable or improved neurological status  Description: INTERVENTIONS  - Assess for and report changes in neurological status  - Initiate measures to prevent increased intracranial pressure  - Maintain blood pressure and fluid volume within ordered parameters to optimize cerebral perfusion and minimize risk of hemorrhage  - Monitor temperature, glucose, and sodium. Initiate appropriate interventions as ordered  Outcome: Progressing     Problem: PAIN - ADULT  Goal: Verbalizes/displays adequate comfort level or patient's stated pain goal  Description: INTERVENTIONS:  - Encourage pt to monitor pain and request assistance  - Assess pain using appropriate pain scale  - Administer analgesics based on type and severity of pain and evaluate response  - Implement non-pharmacological measures as appropriate and evaluate response  - Consider cultural and social influences on pain and pain management  - Manage/alleviate anxiety  - Utilize distraction and/or relaxation techniques  - Monitor for opioid side effects  - Notify MD/LIP if interventions unsuccessful or patient reports new pain  - Anticipate  increased pain with activity and pre-medicate as appropriate  Outcome: Progressing     Problem: RISK FOR INFECTION - ADULT  Goal: Absence of fever/infection during anticipated neutropenic period  Description: INTERVENTIONS  - Monitor WBC  - Administer growth factors as ordered  - Implement neutropenic guidelines  Outcome: Progressing     Problem: DISCHARGE PLANNING  Goal: Discharge to home or other facility with appropriate resources  Description: INTERVENTIONS:  - Identify barriers to discharge w/pt and caregiver  - Include patient/family/discharge partner in discharge planning  - Arrange for needed discharge resources and transportation as appropriate  - Identify discharge learning needs (meds, wound care, etc)  - Arrange for interpreters to assist at discharge as needed  - Consider post-discharge preferences of patient/family/discharge partner  - Complete POLST form as appropriate  - Assess patient's ability to be responsible for managing their own health  - Refer to Case Management Department for coordinating discharge planning if the patient needs post-hospital services based on physician/LIP order or complex needs related to functional status, cognitive ability or social support system  Outcome: Progressing     Problem: RESPIRATORY - ADULT  Goal: Achieves optimal ventilation and oxygenation  Description: INTERVENTIONS:  - Assess for changes in respiratory status  - Assess for changes in mentation and behavior  - Position to facilitate oxygenation and minimize respiratory effort  - Oxygen supplementation based on oxygen saturation or ABGs  - Provide Smoking Cessation handout, if applicable  - Encourage broncho-pulmonary hygiene including cough, deep breathe, Incentive Spirometry  - Assess the need for suctioning and perform as needed  - Assess and instruct to report SOB or any respiratory difficulty  - Respiratory Therapy support as indicated  - Manage/alleviate anxiety  - Monitor for signs/symptoms of CO2  retention  Outcome: Progressing     Problem: Patient Centered Care  Goal: Patient preferences are identified and integrated in the patient's plan of care  Description: Interventions:  - What would you like us to know as we care for you?   - Provide timely, complete, and accurate information to patient/family  - Incorporate patient and family knowledge, values, beliefs, and cultural backgrounds into the planning and delivery of care  - Encourage patient/family to participate in care and decision-making at the level they choose  - Honor patient and family perspectives and choices  Outcome: Progressing     Problem: CARDIOVASCULAR - ADULT  Goal: Maintains optimal cardiac output and hemodynamic stability  Description: INTERVENTIONS:  - Monitor vital signs, rhythm, and trends  - Monitor for bleeding, hypotension and signs of decreased cardiac output  - Evaluate effectiveness of vasoactive medications to optimize hemodynamic stability  - Monitor arterial and/or venous puncture sites for bleeding and/or hematoma  - Assess quality of pulses, skin color and temperature  - Assess for signs of decreased coronary artery perfusion - ex. Angina  - Evaluate fluid balance, assess for edema, trend weights  Outcome: Progressing  Goal: Absence of cardiac arrhythmias or at baseline  Description: INTERVENTIONS:  - Continuous cardiac monitoring, monitor vital signs, obtain 12 lead EKG if indicated  - Evaluate effectiveness of antiarrhythmic and heart rate control medications as ordered  - Initiate emergency measures for life threatening arrhythmias  - Monitor electrolytes and administer replacement therapy as ordered  Outcome: Progressing     Problem: Safety Risk - Non-Violent Restraints  Goal: Patient will remain free from self-harm  Description: INTERVENTIONS:  - Apply the least restrictive restraint to prevent harm  - Notify patient and family of reasons restraints applied  - Assess for any contributing factors to confusion  (electrolyte disturbances, delirium, medications)  - Discontinue any unnecessary medical devices as soon as possible  - Assess the patient's physical comfort, circulation, skin condition, hydration, nutrition and elimination needs   - Reorient and redirection as needed  - Assess for the need to continue restraints  Outcome: Progressing

## 2024-01-01 NOTE — PROGRESS NOTES
[facility]    Progress Note    Ronn Blank Patient Status:  Inpatient    1950 MRN [id number]   Location [facility] 2W/SW Attending Oziel Sanders MD   Hosp Day # 4 PCP OZIEL SANDERS MD     Chief Complaint:     Subjective:     Unable to perform ROS  Skin: Negative.        Objective:   Blood pressure (!) 149/97, pulse 98, temperature 97.4 °F (36.3 °C), temperature source Axillary, resp. rate 22, height 5' 8\" (1.727 m), weight 176 lb 11.2 oz (80.2 kg), SpO2 96%.  Physical Exam  Constitutional:       Appearance: Normal appearance.   HENT:      Head: Normocephalic and atraumatic.   Cardiovascular:      Rate and Rhythm: Normal rate and regular rhythm.      Pulses: Normal pulses.      Heart sounds: Normal heart sounds.   Pulmonary:      Effort: Pulmonary effort is normal.      Breath sounds: Normal breath sounds.   Abdominal:      Palpations: Abdomen is soft.   Musculoskeletal:      Cervical back: Normal range of motion and neck supple.   Skin:     General: Skin is warm.   Neurological:      Mental Status: He is alert.      Comments: Confused         Results:   Lab Results   Component Value Date    WBC 6.3 2023    HGB 11.9 (L) 2023    HCT 35.8 (L) 2023    .0 2023    CREATSERUM 0.79 2023    BUN 16 2023     2023    K 3.9 2024     2023    CO2 24.0 2023     (H) 2023    CA 8.5 (L) 2023    ALB 4.3 2023    ALKPHO 60 2023    BILT 0.8 2023    TP 7.5 2023    AST 39 (H) 2023    ALT 33 2023    T4F 0.81 2018    TSH 1.842 2023    PSA 1.0 2018    DDIMER 0.94 (H) 2023    TROPHS 10 2023     10/28/2022    B12 709 2018       MRI BRAIN (W+WO) (CPT=70553)    Result Date: 2023  CONCLUSION:   No acute ischemic stroke.  Background of moderate global parenchymal volume loss and mild-to-moderate small vessel ischemic disease.   No suspicious intracranial enhancing mass.    Dictated by (CST): Prachi Mazariegos MD on 12/31/2023 at 10:22 PM     Finalized by (CST): Prachi Mazariegos MD on 12/31/2023 at 10:30 PM               Assessment & Plan:         *AMS  Mri no acute findings , possible delirium, wife refused  psych consult   Neurology following   *Fever/Tremors  RRT called 12/28 for shivering/tremor like activities  Transferred to CCU  Continuous EEG did not show any seizure, pending MRI, he seems more confused they had to restrain him overnight    Syncope, near  CT spine and brain negative for acute process  Troponin negative   Fall precautions  Monitor           *Pulmonary consolidation   CT chest: ground glass opacity and pulmonary consolidation most likely representing pulmonary infection  No PE  D-dimer 0.94  Now febrile with TMAX 100.8F today   Procal 0.37  On 2L O2  Cont rocephin and Zithromax   Pulmonology  is following      *Pulmonary malignancy-suspicious per CT  No PE  CT chest: 2.1 x 1.8 cm pulmonary nodule concerning for primary pulmonary malignancy      *HLD  Cont rosuvastatin         HAMLET STEVENSON MD  01/01/2024    Discussed with his wife

## 2024-01-01 NOTE — SLP NOTE
ADULT SWALLOWING EVALUATION    ASSESSMENT    ASSESSMENT/OVERALL IMPRESSION:  PPE REQUIRED. THIS SLP WORE GLOVES AND DROPLET MASK. HANDS SANITIZED/WASHED UPON ENTRANCE/EXIT.     SLP BSSE orders received and acknowledged. A swallow evaluation warranted as pt presents with new onset of of reduced level of alertness, holding solid food in mouth not chewing, and coughing on thin liquids.  MD changed diet to NPO and speech consultation ordered.  Chart reviewed and collaborated with RN.  Swallowing evaluation approved and RN reports with increased breathing difficulties with a regular diet and thin liquids. Pt seen by speech therapy for BSSE on 12/29/23.  At this time the pt with stable alertness.  Per RN the pt has been become more lethargic with pocketing food.  Pt seen at bedside and RN removed autoPAP and placed pt on 7L HFNC. Pt alert, agreeable to participate in BSSE.  Pt denies any pain.  Pt is O x 1, afebrile with clear vocal quality, tolerating NC with oxygen saturation at 99% prior to the start of po trials. No family was present at the time of testing.       Pt positioned upright to 90 degrees in bed. Oral [facility] examination completed.  Face symmetrical at rest but the pt with increased confusion and was not able to complete oral motor movements.  Assessed pt with po trials of puree, soft solids, and mildly liquids via open cup. Pt with functional oral acceptance and bilabial seal across all trials. Oral phase of swallow appears delayed with reduced bolus control and propulsion. Pt with intact bite, significantly prolonged mastication of soft solids, and delayed A-P transit with soft solid bolus.  Incomplete mastication with holding the bolus at times.  Cues provided to chew and swallow soft solids. Mild oral stasis was cleared with a cued liquid wash.  Pharyngeal phase of the swallow appears delayed with adequate hyolaryngeal elevation/excursion.  Oxygen status remained >97% throughout the entire evaluation. No  overt clinical signs of aspiration observed with any consistency (no coughing, no throat clearing, and vocal quality remains dry).  Pt denies any globus sensation post the swallow.  Pt at increased risk for aspiration secondary to increased confusion and fluctuations in alertness. The pt was left at bedside resting in bed with call light in reach.    At this time, pt presents with moderate oral dysphagia and probable pharyngeal dysfunction. Recommend a puree diet and mildly thick liquids/no straw with strict adherence to safe swallowing compensatory strategies with 1:1 feeding and feeding only when alert. Pt unable to report preferred learning style.  Results and recommendations reviewed with pt and RN. Provided education via verbal, written, and demonstration. The pt acknowledged understanding but comprehension appears reduced secondary to increased confusion.  SLP collaborated with RN for diet orders. Diet recommendations and swallowing precautions/strategies posted on white board at bedside.   FCM SCORE: 4/7       PLAN: Will follow up to ensure safety with diet and educate pt on compensatory strategies/swallow precautions. Assess to upgrade diet as level of alertness improves and confusion decreases. Video swallow study to be completed if CXR declines, increase in clinical signs of aspiration, and/or MD desires.         RECOMMENDATIONS   Diet Recommendations - Solids: Puree  Diet Recommendations - Liquids: Nectar thick liquids/ Mildly thick                        Compensatory Strategies Recommended: No straws;Slow rate;Alternate consistencies;Small bites and sips (1:1 feeding; feed only when alert)  Aspiration Precautions: Upright position;Slow rate;Small bites and sips;No straw;Cueing to swallow  Medication Administration Recommendations: Whole in puree  Treatment Plan/Recommendations: Dysphagia therapy;Aspiration precautions  Discharge Recommendations/Plan: Undetermined    HISTORY   MEDICAL HISTORY  Reason for  Referral: R/O aspiration    Problem List  Principal Problem:    Syncope, near  Active Problems:    Hyponatremia    Dehydration    Shivering    Altered mental status    Toxic encephalopathy      Past Medical History  Past Medical History:   Diagnosis Date    Arthritis     Erectile dysfunction     Hearing impairment     [place] - bilateral hearing aids    Hypercholesterolemia     Osteoporosis     PONV (postoperative nausea and vomiting)        Prior Living Situation: Home with spouse  Diet Prior to Admission: Regular;Thin liquids  Precautions: Aspiration    Patient/Family Goal:  to eat    SWALLOWING HISTORY  Current Diet Consistency: NPO  Dysphagia History: The pt was on a regular diet and thin liquids.  Imaging Results:   MRI  CONCLUSION:      No acute ischemic stroke.      Background of moderate global parenchymal volume loss and mild-to-moderate small vessel ischemic disease.      No suspicious intracranial enhancing mass.            Dictated by (CST): Prachi Mazariegos MD on 12/31/2023 at 10:22 PM       Finalized by (CST): Prachi Mazariegos MD on 12/31/2023 at 10:30 PM     CXR  CONCLUSION:   1. Patchy right greater than left basilar opacities are new or more conspicuous since previous day chest radiograph.  Findings are concerning for pneumonia/aspiration and radiographic follow-up to resolution is advised.   2. Subtle spiculated right upper lobe nodule is suspected and better assessed on previous day chest CT.         Dictated by (CST): Mukesh Pierre MD on 12/28/2023 at 8:07 PM       Finalized by (CST): Mukesh Pierre MD on 12/28/2023 at 8:08 PM       OBJECTIVE   ORAL MOTOR EXAMINATION  Dentition: Functional  Symmetry: Within Functional Limits  Strength: Unable to assess (Increased confusion/uable to follow)  Tone: Within Functional Limits  Range of Motion: Unable to assess (Increased confusion/unable to follow commands)  Rate of Motion: Reduced    Voice Quality: Clear  Respiratory Status: Supplemental O2;Nasal  cannula (high flow)  Consistencies Trialed: Nectar thick liquids;Puree;Soft solid  Method of Presentation: Staff/Clinician assistance;Spoon;Cup;Single sips  Patient Positioning: Upright    Oral Phase of Swallow: Impaired  Bolus Retrieval: Intact  Bilabial Seal: Intact  Bolus Formation: Impaired  Bolus Propulsion: Impaired  Mastication: Impaired  Retention: Impaired    Pharyngeal Phase of Swallow: Impaired  Laryngeal Elevation Timing: Appears impaired  Laryngeal Elevation Strength: Appears intact  Laryngeal Elevation Coordination: Appears intact  (Please note: Silent aspiration cannot be evaluated clinically. Videofluoroscopic Swallow Study is required to rule-out silent aspiration.)    Esophageal Phase of Swallow: No complaints consistent with possible esophageal involvement                GOALS  Goal #1 The patient will tolerate puree consistency and mildly thick liquids without overt signs or symptoms of aspiration with 100 % accuracy over 2 session(s).  In Progress   Goal #2 The patient/family/caregiver will demonstrate understanding and implementation of aspiration precautions and swallow strategies independently over 2 session(s).    In Progress   Goal #3 The patient will tolerate trial upgrade of soft/regular consistency and thin liquids without overt signs or symptoms of aspiration with 100 % accuracy over 2 session(s).  In Progress     FOLLOW UP  Treatment Plan/Recommendations: Dysphagia therapy;Aspiration precautions  Number of Visits to Meet Established Goals: 2  Follow Up Needed (Documentation Required): Yes  SLP Follow-up Date: 01/02/24    Thank you for your referral.   If you have any questions, please contact     Virginie Willard MS/CCC-SLP  Speech Language Pathologist  [facility]  EXT. [phone]

## 2024-01-01 NOTE — PROGRESS NOTES
[facility]     Progress Note    Ronn Blank Patient Status:  Inpatient    1950 MRN [id number]   Location [facility] 2W/SW Attending Oziel Sanders MD   Hosp Day # 4 PCP OZIEL SANDERS MD       Subjective:   Patient seen and examined.  Resting in bed.  Restless this morning.    Objective:   Blood pressure (!) 149/97, pulse 98, temperature 97.4 °F (36.3 °C), temperature source Axillary, resp. rate 22, height 5' 8\" (1.727 m), weight 176 lb 11.2 oz (80.2 kg), SpO2 96%.  Intake/Output:   Last 3 shifts: I/O last 3 completed shifts:  In: 240 [P.O.:240]  Out: 150 [Urine:150]   This shift: I/O this shift:  In: 366 [I.V.:366]  Out: -      Vent Settings:      Hemodynamic parameters (last 24 hours):      Scheduled Meds:         Continuous Infusions:    dextrose 75 mL/hr at 23       Physical Exam  Constitutional: Somnolent but arousable  Eyes: PERRL  ENT: nares pateint  Neck: supple, no JVD  Cardio: RRR, S1 S2  Respiratory: Basilar crackles  GI: abdomen soft, non tender, active bowel sounds, no organomegaly  Extremities: no clubbing, cyanosis, edema  Neurologic: no gross motor deficits  Skin: warm, dry      Results:     Lab Results   Component Value Date    K 3.9 2024       MRI BRAIN (W+WO) (CPT=70553)    Result Date: 2023  CONCLUSION:   No acute ischemic stroke.  Background of moderate global parenchymal volume loss and mild-to-moderate small vessel ischemic disease.  No suspicious intracranial enhancing mass.    Dictated by (CST): Prachi Mazariegos MD on 2023 at 10:22 PM     Finalized by (CST): Prachi Mazariegos MD on 2023 at 10:30 PM                 Assessment   1.  Status post fall  2.  Groundglass lung opacities  3.  Right upper lobe lung nodule  4.  Mediastinal/hilar lymphadenopathy  5.  Toxic metabolic encephalopathy  6.  Acute hypoxemic respiratory failure  7.  Delirium     Plan   -Patient presents status post fall.  -CT head with no acute  intracranial abnormality seen  -CT chest with area of groundglass opacities most pronounced right lower lobe concerning for underlying infectious process.  Spiculated 2.1 cm right upper lobe nodularity concerning for malignancy.  Some evidence of mediastinal and hilar lymphadenopathy also noted.  Discussed findings with wife.  Recommend outpatient PET/CT within the next month if patient stable given concern for malignancy.  -Empiric IV antibiotic therapy at this time with Rocephin and azithromycin for presumed pneumonia.    -Further recommendations per neurology.  EEG without significant epileptiform discharges seen.  Unlikely seizures.  -DVT prophylaxis: SCDs  -Discussed CODE STATUS with wife earlier.  Patient DNR but agreeable with short-term intubation.      Chester Katz, DO  Pulmonary Critical Care Medicine  [facility]

## 2024-01-01 NOTE — PLAN OF CARE
Patient A/0x1, soft restraints monitored Q2, cpap, depends because the patient removes male purewick, MRI of the head performed today, results pending, 6L of O2 via nasal canula, patient too confused for oral intake, NPO, call light in place.     Problem: SAFETY ADULT - FALL  Goal: Free from fall injury  Description: INTERVENTIONS:  - Assess pt frequently for physical needs  - Identify cognitive and physical deficits and behaviors that affect risk of falls.  - [place] fall precautions as indicated by assessment.  - Educate pt/family on patient safety including physical limitations  - Instruct pt to call for assistance with activity based on assessment  - Modify environment to reduce risk of injury  - Provide assistive devices as appropriate  - Consider OT/PT consult to assist with strengthening/mobility  - Encourage toileting schedule  Outcome: Progressing     Problem: NEUROLOGICAL - ADULT  Goal: Achieves stable or improved neurological status  Description: INTERVENTIONS  - Assess for and report changes in neurological status  - Initiate measures to prevent increased intracranial pressure  - Maintain blood pressure and fluid volume within ordered parameters to optimize cerebral perfusion and minimize risk of hemorrhage  - Monitor temperature, glucose, and sodium. Initiate appropriate interventions as ordered  Outcome: Progressing     Problem: PAIN - ADULT  Goal: Verbalizes/displays adequate comfort level or patient's stated pain goal  Description: INTERVENTIONS:  - Encourage pt to monitor pain and request assistance  - Assess pain using appropriate pain scale  - Administer analgesics based on type and severity of pain and evaluate response  - Implement non-pharmacological measures as appropriate and evaluate response  - Consider cultural and social influences on pain and pain management  - Manage/alleviate anxiety  - Utilize distraction and/or relaxation techniques  - Monitor for opioid side effects  - Notify  MD/LIP if interventions unsuccessful or patient reports new pain  - Anticipate increased pain with activity and pre-medicate as appropriate  Outcome: Progressing     Problem: RISK FOR INFECTION - ADULT  Goal: Absence of fever/infection during anticipated neutropenic period  Description: INTERVENTIONS  - Monitor WBC  - Administer growth factors as ordered  - Implement neutropenic guidelines  Outcome: Progressing     Problem: DISCHARGE PLANNING  Goal: Discharge to home or other facility with appropriate resources  Description: INTERVENTIONS:  - Identify barriers to discharge w/pt and caregiver  - Include patient/family/discharge partner in discharge planning  - Arrange for needed discharge resources and transportation as appropriate  - Identify discharge learning needs (meds, wound care, etc)  - Arrange for interpreters to assist at discharge as needed  - Consider post-discharge preferences of patient/family/discharge partner  - Complete POLST form as appropriate  - Assess patient's ability to be responsible for managing their own health  - Refer to Case Management Department for coordinating discharge planning if the patient needs post-hospital services based on physician/LIP order or complex needs related to functional status, cognitive ability or social support system  Outcome: Progressing     Problem: RESPIRATORY - ADULT  Goal: Achieves optimal ventilation and oxygenation  Description: INTERVENTIONS:  - Assess for changes in respiratory status  - Assess for changes in mentation and behavior  - Position to facilitate oxygenation and minimize respiratory effort  - Oxygen supplementation based on oxygen saturation or ABGs  - Provide Smoking Cessation handout, if applicable  - Encourage broncho-pulmonary hygiene including cough, deep breathe, Incentive Spirometry  - Assess the need for suctioning and perform as needed  - Assess and instruct to report SOB or any respiratory difficulty  - Respiratory Therapy support as  indicated  - Manage/alleviate anxiety  - Monitor for signs/symptoms of CO2 retention  Outcome: Progressing     Problem: Patient Centered Care  Goal: Patient preferences are identified and integrated in the patient's plan of care  Description: Interventions:  - What would you like us to know as we care for you? From home with wife   - Provide timely, complete, and accurate information to patient/family  - Incorporate patient and family knowledge, values, beliefs, and cultural backgrounds into the planning and delivery of care  - Encourage patient/family to participate in care and decision-making at the level they choose  - Honor patient and family perspectives and choices  Outcome: Progressing     Problem: CARDIOVASCULAR - ADULT  Goal: Maintains optimal cardiac output and hemodynamic stability  Description: INTERVENTIONS:  - Monitor vital signs, rhythm, and trends  - Monitor for bleeding, hypotension and signs of decreased cardiac output  - Evaluate effectiveness of vasoactive medications to optimize hemodynamic stability  - Monitor arterial and/or venous puncture sites for bleeding and/or hematoma  - Assess quality of pulses, skin color and temperature  - Assess for signs of decreased coronary artery perfusion - ex. Angina  - Evaluate fluid balance, assess for edema, trend weights  Outcome: Progressing  Goal: Absence of cardiac arrhythmias or at baseline  Description: INTERVENTIONS:  - Continuous cardiac monitoring, monitor vital signs, obtain 12 lead EKG if indicated  - Evaluate effectiveness of antiarrhythmic and heart rate control medications as ordered  - Initiate emergency measures for life threatening arrhythmias  - Monitor electrolytes and administer replacement therapy as ordered  Outcome: Progressing     Problem: Safety Risk - Non-Violent Restraints  Goal: Patient will remain free from self-harm  Description: INTERVENTIONS:  - Apply the least restrictive restraint to prevent harm  - Notify patient and  family of reasons restraints applied  - Assess for any contributing factors to confusion (electrolyte disturbances, delirium, medications)  - Discontinue any unnecessary medical devices as soon as possible  - Assess the patient's physical comfort, circulation, skin condition, hydration, nutrition and elimination needs   - Reorient and redirection as needed  - Assess for the need to continue restraints  Outcome: Progressing

## 2024-01-01 NOTE — PROGRESS NOTES
[facility]  [address], [address]  [facility] [address]  [phone]          INPATIENT NEUROLOGY   FOLLOW UP CONSULT NOTE       [facility]    Report of Consultation    Ronn Blank Patient Status:  Inpatient     1950 MRN [id number]    Location [facility]5W Attending Oziel Sanders MD    Hosp Day # 4 PCP OZIEL SANDERS MD      Date of Admission:  2023  Date of Consult Follow Up:  2024        INTERVAL HPI:   -MRI brain was done.  Doing well.  Feels \"locked up\" due to restraints.  Joking about \"having a brain\" when discussing MRI results with him.  Says he has a dog at home with his wife.          ?PHYSICAL EXAM:   BP (!) 149/97 (BP Location: Right arm)   Pulse 99   Temp 98 °F (36.7 °C) (Axillary)   Resp 10   Ht 68\"   Wt 176 lb 11.2 oz (80.2 kg)   SpO2 99%   BMI 26.87 kg/m²   General appearance: Well appearing, and in no acute distress  Skin: skin color, texture normal.  No rashes or lesions.    Head: Normocephalic, atraumatic.    Neurological exam:  Mental Status: Alert, oriented to situation/normal fund of knowledge, and Speech fluent and appropriate.      LABS/DATA:    Lab Results   Component Value Date    K 3.9 2024       HGBA1C:    Lab Results   Component Value Date    A1C 6.5 (H) 2023     (H) 2023              IMAGING:  MRI BRAIN (W+WO) (CPT=70553)    Result Date: 2023  CONCLUSION:   No acute ischemic stroke.  Background of moderate global parenchymal volume loss and mild-to-moderate small vessel ischemic disease.  No suspicious intracranial enhancing mass.    Dictated by (CST): Prachi Mazariegos MD on 2023 at 10:22 PM     Finalized by (CST): Prachi Mazarigeos MD on 2023 at 10:30 PM        I PERSONALLY REVIEWED THESE IMAGES.     ASSESSMENT:  The patient is a 73 year old   man with past medical history of hyperlipidemia and recent COVID infection continue with coughing, chronic fatigue  and was found to have pneumonia.  Neurology was consulted for episodes of shivering due to concerns of for seizures.  Continuous EEG has been normal.  Likely these shivering episodes are nonepileptic and related to underlying medical illness (pneumonia).  He is much improved and likely his encephalopathy was toxic from his pneumonia.    MRI brain with no acute findings background generalized atrophy and chronic microvascular ischemic change     Toxic encephalopathy slowly improving  - Continue to monitor    Delirium - waxing and waning with agitation  -Improving well, continue to monitor     No further inpatient neurological testing needed.  Follow up as needed.  Please call w/ further questions.        This note was prepared using Dragon Medical voice recognition dictation software and as a result, errors may occur. When identified, these errors have been corrected. While every attempt is made to correct errors during dictation, discrepancies may still exist    ANTONIETTA Mark DO   Staff Neurologist   1/1/2024  2:47 PM

## 2024-01-02 LAB
ANION GAP SERPL CALC-SCNC: 6 MMOL/L (ref 0–18)
BASOPHILS # BLD AUTO: 0.05 X10(3) UL (ref 0–0.2)
BASOPHILS NFR BLD AUTO: 0.6 %
BUN BLD-MCNC: 15 MG/DL (ref 9–23)
BUN/CREAT SERPL: 18.5 (ref 10–20)
CALCIUM BLD-MCNC: 8.8 MG/DL (ref 8.7–10.4)
CHLORIDE SERPL-SCNC: 106 MMOL/L (ref 98–112)
CO2 SERPL-SCNC: 25 MMOL/L (ref 21–32)
CREAT BLD-MCNC: 0.81 MG/DL
DEPRECATED RDW RBC AUTO: 40 FL (ref 35.1–46.3)
EGFRCR SERPLBLD CKD-EPI 2021: 93 ML/MIN/1.73M2 (ref 60–?)
EOSINOPHIL # BLD AUTO: 0.18 X10(3) UL (ref 0–0.7)
EOSINOPHIL NFR BLD AUTO: 2 %
ERYTHROCYTE [DISTWIDTH] IN BLOOD BY AUTOMATED COUNT: 12.6 % (ref 11–15)
GLUCOSE BLD-MCNC: 139 MG/DL (ref 70–99)
GLUCOSE BLDC GLUCOMTR-MCNC: 109 MG/DL (ref 70–99)
GLUCOSE BLDC GLUCOMTR-MCNC: 130 MG/DL (ref 70–99)
GLUCOSE BLDC GLUCOMTR-MCNC: 137 MG/DL (ref 70–99)
GLUCOSE BLDC GLUCOMTR-MCNC: 175 MG/DL (ref 70–99)
HCT VFR BLD AUTO: 37.6 %
HGB BLD-MCNC: 12.5 G/DL
IMM GRANULOCYTES # BLD AUTO: 0.1 X10(3) UL (ref 0–1)
IMM GRANULOCYTES NFR BLD: 1.1 %
LYMPHOCYTES # BLD AUTO: 1.61 X10(3) UL (ref 1–4)
LYMPHOCYTES NFR BLD AUTO: 18.1 %
MCH RBC QN AUTO: 28.7 PG (ref 26–34)
MCHC RBC AUTO-ENTMCNC: 33.2 G/DL (ref 31–37)
MCV RBC AUTO: 86.4 FL
MONOCYTES # BLD AUTO: 0.71 X10(3) UL (ref 0.1–1)
MONOCYTES NFR BLD AUTO: 8 %
NEUTROPHILS # BLD AUTO: 6.26 X10 (3) UL (ref 1.5–7.7)
NEUTROPHILS # BLD AUTO: 6.26 X10(3) UL (ref 1.5–7.7)
NEUTROPHILS NFR BLD AUTO: 70.2 %
OSMOLALITY SERPL CALC.SUM OF ELEC: 287 MOSM/KG (ref 275–295)
PLATELET # BLD AUTO: 393 10(3)UL (ref 150–450)
POTASSIUM SERPL-SCNC: 3.8 MMOL/L (ref 3.5–5.1)
RBC # BLD AUTO: 4.35 X10(6)UL
SODIUM SERPL-SCNC: 137 MMOL/L (ref 136–145)
WBC # BLD AUTO: 8.9 X10(3) UL (ref 4–11)

## 2024-01-02 PROCEDURE — 99232 SBSQ HOSP IP/OBS MODERATE 35: CPT | Performed by: INTERNAL MEDICINE

## 2024-01-02 RX ORDER — VANCOMYCIN HYDROCHLORIDE 125 MG/1
125 CAPSULE ORAL DAILY
Status: DISCONTINUED | OUTPATIENT
Start: 2024-01-02 | End: 2024-01-04

## 2024-01-02 NOTE — PLAN OF CARE
Problem: SAFETY ADULT - FALL  Goal: Free from fall injury  Description: INTERVENTIONS:  - Assess pt frequently for physical needs  - Identify cognitive and physical deficits and behaviors that affect risk of falls.  - [place] fall precautions as indicated by assessment.  - Educate pt/family on patient safety including physical limitations  - Instruct pt to call for assistance with activity based on assessment  - Modify environment to reduce risk of injury  - Provide assistive devices as appropriate  - Consider OT/PT consult to assist with strengthening/mobility  - Encourage toileting schedule  Outcome: Progressing     Problem: NEUROLOGICAL - ADULT  Goal: Achieves stable or improved neurological status  Description: INTERVENTIONS  - Assess for and report changes in neurological status  - Initiate measures to prevent increased intracranial pressure  - Maintain blood pressure and fluid volume within ordered parameters to optimize cerebral perfusion and minimize risk of hemorrhage  - Monitor temperature, glucose, and sodium. Initiate appropriate interventions as ordered  Outcome: Progressing     Problem: PAIN - ADULT  Goal: Verbalizes/displays adequate comfort level or patient's stated pain goal  Description: INTERVENTIONS:  - Encourage pt to monitor pain and request assistance  - Assess pain using appropriate pain scale  - Administer analgesics based on type and severity of pain and evaluate response  - Implement non-pharmacological measures as appropriate and evaluate response  - Consider cultural and social influences on pain and pain management  - Manage/alleviate anxiety  - Utilize distraction and/or relaxation techniques  - Monitor for opioid side effects  - Notify MD/LIP if interventions unsuccessful or patient reports new pain  - Anticipate increased pain with activity and pre-medicate as appropriate  Outcome: Progressing     Problem: RISK FOR INFECTION - ADULT  Goal: Absence of fever/infection during  anticipated neutropenic period  Description: INTERVENTIONS  - Monitor WBC  - Administer growth factors as ordered  - Implement neutropenic guidelines  Outcome: Progressing     Problem: DISCHARGE PLANNING  Goal: Discharge to home or other facility with appropriate resources  Description: INTERVENTIONS:  - Identify barriers to discharge w/pt and caregiver  - Include patient/family/discharge partner in discharge planning  - Arrange for needed discharge resources and transportation as appropriate  - Identify discharge learning needs (meds, wound care, etc)  - Arrange for interpreters to assist at discharge as needed  - Consider post-discharge preferences of patient/family/discharge partner  - Complete POLST form as appropriate  - Assess patient's ability to be responsible for managing their own health  - Refer to Case Management Department for coordinating discharge planning if the patient needs post-hospital services based on physician/LIP order or complex needs related to functional status, cognitive ability or social support system  Outcome: Progressing     Problem: RESPIRATORY - ADULT  Goal: Achieves optimal ventilation and oxygenation  Description: INTERVENTIONS:  - Assess for changes in respiratory status  - Assess for changes in mentation and behavior  - Position to facilitate oxygenation and minimize respiratory effort  - Oxygen supplementation based on oxygen saturation or ABGs  - Provide Smoking Cessation handout, if applicable  - Encourage broncho-pulmonary hygiene including cough, deep breathe, Incentive Spirometry  - Assess the need for suctioning and perform as needed  - Assess and instruct to report SOB or any respiratory difficulty  - Respiratory Therapy support as indicated  - Manage/alleviate anxiety  - Monitor for signs/symptoms of CO2 retention  Outcome: Progressing     Problem: Patient Centered Care  Goal: Patient preferences are identified and integrated in the patient's plan of care  Description:  Interventions:  - What would you like us to know as we care for you? From home with wife   - Provide timely, complete, and accurate information to patient/family  - Incorporate patient and family knowledge, values, beliefs, and cultural backgrounds into the planning and delivery of care  - Encourage patient/family to participate in care and decision-making at the level they choose  - Honor patient and family perspectives and choices  Outcome: Progressing     Problem: CARDIOVASCULAR - ADULT  Goal: Maintains optimal cardiac output and hemodynamic stability  Description: INTERVENTIONS:  - Monitor vital signs, rhythm, and trends  - Monitor for bleeding, hypotension and signs of decreased cardiac output  - Evaluate effectiveness of vasoactive medications to optimize hemodynamic stability  - Monitor arterial and/or venous puncture sites for bleeding and/or hematoma  - Assess quality of pulses, skin color and temperature  - Assess for signs of decreased coronary artery perfusion - ex. Angina  - Evaluate fluid balance, assess for edema, trend weights  Outcome: Progressing  Goal: Absence of cardiac arrhythmias or at baseline  Description: INTERVENTIONS:  - Continuous cardiac monitoring, monitor vital signs, obtain 12 lead EKG if indicated  - Evaluate effectiveness of antiarrhythmic and heart rate control medications as ordered  - Initiate emergency measures for life threatening arrhythmias  - Monitor electrolytes and administer replacement therapy as ordered  Outcome: Progressing     Problem: Safety Risk - Non-Violent Restraints  Goal: Patient will remain free from self-harm  Description: INTERVENTIONS:  - Apply the least restrictive restraint to prevent harm  - Notify patient and family of reasons restraints applied  - Assess for any contributing factors to confusion (electrolyte disturbances, delirium, medications)  - Discontinue any unnecessary medical devices as soon as possible  - Assess the patient's physical comfort,  circulation, skin condition, hydration, nutrition and elimination needs   - Reorient and redirection as needed  - Assess for the need to continue restraints  Outcome: Progressing     Problem: Diabetes/Glucose Control  Goal: Glucose maintained within prescribed range  Description: INTERVENTIONS:  - Monitor Blood Glucose as ordered  - Assess for signs and symptoms of hyperglycemia and hypoglycemia  - Administer ordered medications to maintain glucose within target range  - Assess barriers to adequate nutritional intake and initiate nutrition consult as needed  - Instruct patient on self management of diabetes  Outcome: Progressing     Problem: Patient/Family Goals  Goal: Patient/Family Long Term Goal  Description: Patient's Long Term Goal: To discharge from hospital     Interventions:  -  Monitor vital signs   - Monitor appropriate labs   -  Monitor blood glucose levels   - Pain management   - Administer medications per order   - Follow MD orders   - Diagnostics per order   - Update/inform patient and family on plan of care   - Discharge planning    - See additional Care Plan goals for specific interventions  Outcome: Progressing  Goal: Patient/Family Short Term Goal  Description: Patient's Short Term Goal: To improve strength     Interventions:   - -  Monitor vital signs   - Monitor appropriate labs   -  Monitor blood glucose levels   - Pain management   - Administer medications per order   - Follow MD orders   - Diagnostics per order   - Update/inform patient and family on plan of care   - See additional Care Plan goals for specific interventions  Outcome: Progressing     Monitoring vital signs, stable at this time. No acute changes at this moment. Monitoring blood glucose levels. Fall precautions in place- bed alarm on, bed locked in lowest position, call light within reach. Frequent rounding by nursing staff.

## 2024-01-02 NOTE — PROGRESS NOTES
[facility]     Progress Note    Ronn Blank Patient Status:  Inpatient    1950 MRN [id number]   Location [facility] 2W/SW Attending Oziel Sanders MD   Hosp Day # 5 PCP OZIEL SANDERS MD       Subjective:   Patient seen and examined.  Resting in bed.  Arousable this morning.  Some occasional cough present.  Denies significant dyspnea    Objective:   Blood pressure (!) 124/99, pulse 103, temperature 98.2 °F (36.8 °C), temperature source Oral, resp. rate 17, height 5' 8\" (1.727 m), weight 178 lb 9.6 oz (81 kg), SpO2 96%.  Intake/Output:   Last 3 shifts: I/O last 3 completed shifts:  In: 586 [P.O.:110; I.V.:376; IV PIGGYBACK:100]  Out: 700 [Urine:700]   This shift: No intake/output data recorded.     Vent Settings:      Hemodynamic parameters (last 24 hours):      Scheduled Meds:         Continuous Infusions:         Physical Exam  Constitutional: Somnolent but arousable  Eyes: PERRL  ENT: nares pateint  Neck: supple, no JVD  Cardio: RRR, S1 S2  Respiratory: Basilar crackles  GI: abdomen soft, non tender, active bowel sounds, no organomegaly  Extremities: no clubbing, cyanosis, edema  Neurologic: no gross motor deficits  Skin: warm, dry      Results:     Lab Results   Component Value Date    WBC 8.9 2024    HGB 12.5 2024    HCT 37.6 2024    .0 2024    CREATSERUM 0.81 2024    BUN 15 2024     2024    K 3.8 2024     2024    CO2 25.0 2024     2024    CA 8.8 2024       MRI BRAIN (W+WO) (CPT=70553)    Result Date: 2023  CONCLUSION:   No acute ischemic stroke.  Background of moderate global parenchymal volume loss and mild-to-moderate small vessel ischemic disease.  No suspicious intracranial enhancing mass.    Dictated by (CST): Prachi Mazariegos MD on 2023 at 10:22 PM     Finalized by (CST): Prachi Mazariegos MD on 2023 at 10:30 PM                 Assessment   1.  Status  post fall  2.  Groundglass lung opacities  3.  Right upper lobe lung nodule  4.  Mediastinal/hilar lymphadenopathy  5.  Toxic metabolic encephalopathy  6.  Acute hypoxemic respiratory failure  7.  Delirium     Plan   -Patient presents status post fall.  -CT head with no acute intracranial abnormality seen  -CT chest with area of groundglass opacities most pronounced right lower lobe concerning for underlying infectious process.  Spiculated 2.1 cm right upper lobe nodularity concerning for malignancy.  Some evidence of mediastinal and hilar lymphadenopathy also noted.  Discussed findings with wife.  Recommend outpatient PET/CT within the next month if patient stable given concern for malignancy.  -Empiric IV antibiotic therapy at this time with Rocephin and azithromycin for presumed pneumonia.    -Wean oxygen as tolerated.  -Further recommendations per neurology.  EEG without significant epileptiform discharges seen.  Unlikely seizures.  -DVT prophylaxis: SCDs  -Discussed CODE STATUS with wife earlier.  Patient DNR but agreeable with short-term intubation.      Chester Katz, DO  Pulmonary Critical Care Medicine  [facility]

## 2024-01-02 NOTE — PROGRESS NOTES
[facility]    Progress Note    Ronn Blank Patient Status:  Inpatient    1950 MRN [id number]   Location [facility]5W Attending Oziel Sanders MD   Hosp Day # 5 PCP OZIEL SANDERS MD     Chief Complaint: fall    Subjective:     Constitutional:  Positive for activity change, appetite change and fatigue.   Respiratory:  Positive for cough, chest tightness and shortness of breath.    Cardiovascular:  Negative for chest pain and palpitations.   Gastrointestinal:  Negative for abdominal pain.   Allergic/Immunologic: Negative.    Neurological: Negative.    Hematological: Negative.    Psychiatric/Behavioral: Negative.     Pt more alert today, wife at bedside. Restraints removed.     Objective:   Blood pressure (!) 124/99, pulse 103, temperature 98.2 °F (36.8 °C), temperature source Oral, resp. rate 17, height 5' 8\" (1.727 m), weight 178 lb 9.6 oz (81 kg), SpO2 96%.  Physical Exam  Constitutional:       General: He is not in acute distress.     Appearance: He is ill-appearing.   Cardiovascular:      Rate and Rhythm: Normal rate and regular rhythm.   Pulmonary:      Breath sounds: Rales present.   Abdominal:      General: Bowel sounds are normal.   Skin:     General: Skin is warm.   Neurological:      General: No focal deficit present.      Mental Status: He is alert.   Psychiatric:         Mood and Affect: Mood normal.         Thought Content: Thought content normal.         Results:   Lab Results   Component Value Date    WBC 8.9 2024    HGB 12.5 (L) 2024    HCT 37.6 (L) 2024    .0 2024    CREATSERUM 0.81 2024    BUN 15 2024     2024    K 3.8 2024     2024    CO2 25.0 2024     (H) 2024    CA 8.8 2024    ALB 4.3 2023    ALKPHO 60 2023    BILT 0.8 2023    TP 7.5 2023    AST 39 (H) 2023    ALT 33 2023    T4F 0.81 2018    TSH 1.842 2023    PSA  1.0 06/08/2018    DDIMER 0.94 (H) 12/27/2023    TROPHS 10 12/27/2023     10/28/2022    B12 709 06/08/2018       MRI BRAIN (W+WO) (CPT=70553)    Result Date: 12/31/2023  CONCLUSION:   No acute ischemic stroke.  Background of moderate global parenchymal volume loss and mild-to-moderate small vessel ischemic disease.  No suspicious intracranial enhancing mass.    Dictated by (CST): Prachi Mazariegos MD on 12/31/2023 at 10:22 PM     Finalized by (CST): Prachi Mazariegos MD on 12/31/2023 at 10:30 PM               Assessment & Plan:   *AMS  MRI with no acute findings , possible delirium, wife refused psych consult   Doing better today  Neurology following     *Fever/Tremors  resolved  RRT called 12/28 for shivering/tremor like activities  Transferred to CCU, now stable  Continuous EEG did not show any seizure, pending MRI, he seems more confused they had to restrain him overnight     Syncope, near  CT spine and brain negative for acute process  Troponin negative   Fall precautions  Monitor     *Pulmonary consolidation   CT chest: ground glass opacity and pulmonary consolidation most likely representing pulmonary infection  No PE  D-dimer 0.94  Now afebrile   Procal 0.37  On 2L O2  Cont rocephin and Zithromax   Pulmonology  is following      *Pulmonary nodule, suspicious per CT  No PE  CT chest: 2.1 x 1.8 cm pulmonary nodule concerning for primary pulmonary malignancy      *HLD  Cont rosuvastatin       DNR/full  DVT px: lovenox      **Certification      PHYSICIAN Certification of Need for Inpatient Hospitalization - Initial Certification    Patient will require inpatient services that will reasonably be expected to span two midnight's based on the clinical documentation in H+P.   Based on patients current state of illness, I anticipate that, after discharge, patient will require TBD.      Robert Velasquez MD  1/2/2024

## 2024-01-02 NOTE — CDS QUERY
CLINICAL DOCUMENTATION CLARIFICATION FORM    Dear Dr Varela:  Clinical information (provided below) suggests Potential Risk for Infection.  PLEASE (X) ALL DIAGNOSES THAT APPLY.  SELECTION BY PROVIDER ONLY  ( x ) Sepsis  ( ) Severe Sepsis  ( ) Other (please specify): ___________________________________________    _________________________________________________________________________  Clinical:  Fever  Tachycardia  Tachypnea  LA 2.1  TME  AHRF    Risk Factors:  73yom with pneumonia, syncope/fall at home    Treatments:  IV ATB  IVF  Blood Culture  Tele  Restraints  Imaging        If you have any questions, please contact Clinical :  [email or web address]    Thank You!

## 2024-01-02 NOTE — PLAN OF CARE
The patient is A/0x2, soft restraints maintained, cleared by neurology, diet updated to full liquid and nectar thick fluids, IV antibiotics, wife updated on plan of care, CPAP while asleep, male purewick in place, call light in place with frequent rounding.     Problem: SAFETY ADULT - FALL  Goal: Free from fall injury  Description: INTERVENTIONS:  - Assess pt frequently for physical needs  - Identify cognitive and physical deficits and behaviors that affect risk of falls.  - [place] fall precautions as indicated by assessment.  - Educate pt/family on patient safety including physical limitations  - Instruct pt to call for assistance with activity based on assessment  - Modify environment to reduce risk of injury  - Provide assistive devices as appropriate  - Consider OT/PT consult to assist with strengthening/mobility  - Encourage toileting schedule  Outcome: Progressing     Problem: NEUROLOGICAL - ADULT  Goal: Achieves stable or improved neurological status  Description: INTERVENTIONS  - Assess for and report changes in neurological status  - Initiate measures to prevent increased intracranial pressure  - Maintain blood pressure and fluid volume within ordered parameters to optimize cerebral perfusion and minimize risk of hemorrhage  - Monitor temperature, glucose, and sodium. Initiate appropriate interventions as ordered  Outcome: Progressing     Problem: PAIN - ADULT  Goal: Verbalizes/displays adequate comfort level or patient's stated pain goal  Description: INTERVENTIONS:  - Encourage pt to monitor pain and request assistance  - Assess pain using appropriate pain scale  - Administer analgesics based on type and severity of pain and evaluate response  - Implement non-pharmacological measures as appropriate and evaluate response  - Consider cultural and social influences on pain and pain management  - Manage/alleviate anxiety  - Utilize distraction and/or relaxation techniques  - Monitor for opioid side  effects  - Notify MD/LIP if interventions unsuccessful or patient reports new pain  - Anticipate increased pain with activity and pre-medicate as appropriate  Outcome: Progressing     Problem: RISK FOR INFECTION - ADULT  Goal: Absence of fever/infection during anticipated neutropenic period  Description: INTERVENTIONS  - Monitor WBC  - Administer growth factors as ordered  - Implement neutropenic guidelines  Outcome: Progressing     Problem: DISCHARGE PLANNING  Goal: Discharge to home or other facility with appropriate resources  Description: INTERVENTIONS:  - Identify barriers to discharge w/pt and caregiver  - Include patient/family/discharge partner in discharge planning  - Arrange for needed discharge resources and transportation as appropriate  - Identify discharge learning needs (meds, wound care, etc)  - Arrange for interpreters to assist at discharge as needed  - Consider post-discharge preferences of patient/family/discharge partner  - Complete POLST form as appropriate  - Assess patient's ability to be responsible for managing their own health  - Refer to Case Management Department for coordinating discharge planning if the patient needs post-hospital services based on physician/LIP order or complex needs related to functional status, cognitive ability or social support system  Outcome: Progressing     Problem: RESPIRATORY - ADULT  Goal: Achieves optimal ventilation and oxygenation  Description: INTERVENTIONS:  - Assess for changes in respiratory status  - Assess for changes in mentation and behavior  - Position to facilitate oxygenation and minimize respiratory effort  - Oxygen supplementation based on oxygen saturation or ABGs  - Provide Smoking Cessation handout, if applicable  - Encourage broncho-pulmonary hygiene including cough, deep breathe, Incentive Spirometry  - Assess the need for suctioning and perform as needed  - Assess and instruct to report SOB or any respiratory difficulty  - Respiratory  Therapy support as indicated  - Manage/alleviate anxiety  - Monitor for signs/symptoms of CO2 retention  Outcome: Progressing     Problem: Patient Centered Care  Goal: Patient preferences are identified and integrated in the patient's plan of care  Description: Interventions:  - What would you like us to know as we care for you? The patient is from home with his wife   - Provide timely, complete, and accurate information to patient/family  - Incorporate patient and family knowledge, values, beliefs, and cultural backgrounds into the planning and delivery of care  - Encourage patient/family to participate in care and decision-making at the level they choose  - Honor patient and family perspectives and choices  Outcome: Progressing     Problem: CARDIOVASCULAR - ADULT  Goal: Maintains optimal cardiac output and hemodynamic stability  Description: INTERVENTIONS:  - Monitor vital signs, rhythm, and trends  - Monitor for bleeding, hypotension and signs of decreased cardiac output  - Evaluate effectiveness of vasoactive medications to optimize hemodynamic stability  - Monitor arterial and/or venous puncture sites for bleeding and/or hematoma  - Assess quality of pulses, skin color and temperature  - Assess for signs of decreased coronary artery perfusion - ex. Angina  - Evaluate fluid balance, assess for edema, trend weights  Outcome: Progressing  Goal: Absence of cardiac arrhythmias or at baseline  Description: INTERVENTIONS:  - Continuous cardiac monitoring, monitor vital signs, obtain 12 lead EKG if indicated  - Evaluate effectiveness of antiarrhythmic and heart rate control medications as ordered  - Initiate emergency measures for life threatening arrhythmias  - Monitor electrolytes and administer replacement therapy as ordered  Outcome: Progressing     Problem: Safety Risk - Non-Violent Restraints  Goal: Patient will remain free from self-harm  Description: INTERVENTIONS:  - Apply the least restrictive restraint to  prevent harm  - Notify patient and family of reasons restraints applied  - Assess for any contributing factors to confusion (electrolyte disturbances, delirium, medications)  - Discontinue any unnecessary medical devices as soon as possible  - Assess the patient's physical comfort, circulation, skin condition, hydration, nutrition and elimination needs   - Reorient and redirection as needed  - Assess for the need to continue restraints  Outcome: Progressing

## 2024-01-03 LAB
GLUCOSE BLDC GLUCOMTR-MCNC: 116 MG/DL (ref 70–99)
GLUCOSE BLDC GLUCOMTR-MCNC: 133 MG/DL (ref 70–99)
GLUCOSE BLDC GLUCOMTR-MCNC: 154 MG/DL (ref 70–99)
GLUCOSE BLDC GLUCOMTR-MCNC: 159 MG/DL (ref 70–99)

## 2024-01-03 PROCEDURE — 99232 SBSQ HOSP IP/OBS MODERATE 35: CPT | Performed by: INTERNAL MEDICINE

## 2024-01-03 RX ORDER — CARVEDILOL 6.25 MG/1
6.25 TABLET ORAL 2 TIMES DAILY WITH MEALS
Status: DISCONTINUED | OUTPATIENT
Start: 2024-01-03 | End: 2024-01-04

## 2024-01-03 NOTE — DIETARY NOTE
Brief Nutrition Note:    Pt admitted for syncope. Pt screened at no nutrition risk at admission by RN. Pt re-screened r/t length of stay (LOS). Chart reviewed, RRT called 12/28 for shivering/tremor like activities. CXR with evidence of RLL pneumonia. Pt visited, spouse at bedside. Pt reports ate a great breakfast and lunch today. Variable intake noted per EMR review however minimal documentation noted. Pt made NPO r/t confusion on 12/31, diet advanced to full liquid with mildly thick liquids on 1/1 and back to Regular on 1/3 which pt was happy about. Pt reports appetite/intake is good. Pt denies weight loss, usual body weight about 180#. Current weight 178# 9.6oz. EMR weight review, weight appears relatively stable. Pt appears well-nourished. Discussed adding high protein snack or oral nutritional supplement (ONS), pt declined. Monitor intakes now that pt back on regular diet. Pt remains at no nutrition risk at this time. A1c 6.5% on 11/2/23 - POC Glucose reviewed, liberalized diet. F/u at length of stay (LOS) per protocol. Please consult nutrition services if earlier intervention is indicated.    Wt Readings from Last 6 Encounters:   01/02/24 81 kg (178 lb 9.6 oz)   12/29/23 78 kg (171 lb 15.3 oz)   12/14/23 82.6 kg (182 lb)   11/26/23 83 kg (183 lb)   11/02/23 83 kg (183 lb)   11/02/23 83 kg (183 lb)     Patient Weight(s) for the past 336 hrs:   Weight   01/02/24 0533 81 kg (178 lb 9.6 oz)   01/01/24 0405 80.2 kg (176 lb 11.2 oz)   12/31/23 0525 81.2 kg (179 lb)   12/30/23 1733 82.3 kg (181 lb 8 oz)   12/27/23 0529 78 kg (172 lb)     Percent Meals Eaten (last 6 days)       Date/Time Percent Meals Eaten (%)    12/29/23 1200 40 %    01/01/24 1608 100 %     Percent Meals Eaten (%): one container of apple sauce at 01/01/24 1608    01/01/24 1723 100 %     Percent Meals Eaten (%): one cup of cream of chicken soup at 01/01/24 1723    01/02/24 0548 15 %     Percent Meals Eaten (%): Apple sauce with meds at 01/02/24 0548           Dietary Orders (From admission, onward)       Start     Ordered    01/03/24 1430  Regular/General diet Fluid Consistency: Thin Liquids ; Texture Consistency: Regular; Is Patient on Accuchecks? Yes; Misc Restriction: No Straw  Diet effective now        Question Answer Comment   Fluid Consistency Thin Liquids     Texture Consistency Regular    Is Patient on Accuchecks? Yes    Misc Restriction No Straw        01/03/24 1429                  Clarice Poe MS, JANINE, RDN, LDN  Clinical Dietitian  P: [phone]

## 2024-01-03 NOTE — SLP NOTE
SPEECH DAILY NOTE - INPATIENT    ASSESSMENT & PLAN   ASSESSMENT  Pt seen to monitor tolerance of PO diet, train compensatory strategies and assess candidacy for diet upgrade.  Pt afebrile and on 5L HF O2 NC. He was alert and cooperative sitting upright in bed, talking easily and in good spirits.  Presented trials of current diet as well as hard solid and thin liquids.      Oral phase was within normal limits with adequate bilabial seal and bolus containment, timely mastication and transit and no oral retention.  The pharyngeal response appeared timely with adequate laryngeal excursion.  Cough x1 with thin liquids in consecutive drinks by straw.  No cough with single drinks by straw or by cup.      Recommend upgrade to general diet with thin liquids, single sips, no straw.  Will follow x1 to monitor tolerance.  D/W RN.        Diet Recommendations - Solids: Regular  Diet Recommendations - Liquids: Thin Liquids    Compensatory Strategies Recommended: No straws;Slow rate  Aspiration Precautions: Upright position;Slow rate;Small bites and sips;No straw;Cueing to swallow  Medication Administration Recommendations: Whole in puree    Patient Experiencing Pain: No                Discharge Recommendations  Discharge Recommendations/Plan: Undetermined    Treatment Plan  Treatment Plan/Recommendations: Dysphagia therapy;Aspiration precautions    Interdisciplinary Communication: Discussed with RN            GOALS  Goal #1 The patient will tolerate puree consistency and mildly thick liquids without overt signs or symptoms of aspiration with 100 % accuracy over 2 session(s).    Met.  New goal:  Pt will tolerate general diet with thin liquids without overt signs of aspiration with 100% accuracy.  Goal met and upgraded   Goal #2 The patient/family/caregiver will demonstrate understanding and implementation of aspiration precautions and swallow strategies independently over 2 session(s).    Reviewed no straw with patient.  He  indicated understanding.  In Progress   Goal #3 The patient will tolerate trial upgrade of soft/regular consistency and thin liquids without overt signs or symptoms of aspiration with 100 % accuracy over 2 session(s).    Goal met.  met       FOLLOW UP  Follow Up Needed (Documentation Required): Yes  SLP Follow-up Date: 01/04/24  Number of Visits to Meet Established Goals: 2    Session: 1    If you have any questions, please contact BRUNO Riley MA/[facility]-SLP  Speech Language Pathologist  [facility]  Ext. [phone]

## 2024-01-03 NOTE — PROGRESS NOTES
[facility]     Progress Note    Ronn Blank Patient Status:  Inpatient    1950 MRN [id number]   Location [facility] 2W/SW Attending Oziel Sanders MD   Hosp Day # 6 PCP OZIEL SANDERS MD       Subjective:   Patient seen and examined.  Resting in bed.  Arousable this morning.  Some occasional cough present.  Denies significant dyspnea    Objective:   Blood pressure 126/86, pulse 93, temperature 98 °F (36.7 °C), temperature source Oral, resp. rate 14, height 5' 8\" (1.727 m), weight 178 lb 9.6 oz (81 kg), SpO2 94%.  Intake/Output:   Last 3 shifts: I/O last 3 completed shifts:  In: 340 [P.O.:130; I.V.:10; IV PIGGYBACK:200]  Out: 1050 [Urine:1050]   This shift: No intake/output data recorded.     Vent Settings:      Hemodynamic parameters (last 24 hours):      Scheduled Meds:         Continuous Infusions:         Physical Exam  Constitutional: Somnolent but arousable  Eyes: PERRL  ENT: nares pateint  Neck: supple, no JVD  Cardio: RRR, S1 S2  Respiratory: Basilar crackles  GI: abdomen soft, non tender, active bowel sounds, no organomegaly  Extremities: no clubbing, cyanosis, edema  Neurologic: no gross motor deficits  Skin: warm, dry      Results:            No results found.          Assessment   1.  Status post fall  2.  Groundglass lung opacities  3.  Right upper lobe lung nodule  4.  Mediastinal/hilar lymphadenopathy  5.  Toxic metabolic encephalopathy  6.  Acute hypoxemic respiratory failure       Plan   -Patient presents status post fall.  -CT head with no acute intracranial abnormality seen  -CT chest with area of groundglass opacities most pronounced right lower lobe concerning for underlying infectious process.  Spiculated 2.1 cm right upper lobe nodularity concerning for malignancy.  Some evidence of mediastinal and hilar lymphadenopathy also noted.  Discussed findings with wife.  Recommend outpatient PET/CT within the next month if patient stable given concern for  malignancy.  -Empiric IV antibiotic therapy at this time with Rocephin and azithromycin for presumed pneumonia.  Complete 7-day course of Zosyn.-Wean oxygen as tolerated.  -DVT prophylaxis: SCDs  -Discussed CODE STATUS with wife earlier.  Patient DNR but agreeable with short-term intubation.      Chester Katz, DO  Pulmonary Critical Care Medicine  [facility]

## 2024-01-03 NOTE — CM/SW NOTE
Pt and spouse are requesting [facility] for [facility] at [place] since pt was employed there for many years. [employer] in admissions at [facility] confirmed pt has been accepted. Facility reserved in [facility]. Pt currently on 4L O2 (no home O2).    Plan: [facility] for LTC pending medical clearance.    Dung Stone, CATIEN    [phone]

## 2024-01-03 NOTE — PLAN OF CARE
Problem: SAFETY ADULT - FALL  Goal: Free from fall injury  Description: INTERVENTIONS:  - Assess pt frequently for physical needs  - Identify cognitive and physical deficits and behaviors that affect risk of falls.  - [place] fall precautions as indicated by assessment.  - Educate pt/family on patient safety including physical limitations  - Instruct pt to call for assistance with activity based on assessment  - Modify environment to reduce risk of injury  - Provide assistive devices as appropriate  - Consider OT/PT consult to assist with strengthening/mobility  - Encourage toileting schedule  Outcome: Progressing     Problem: NEUROLOGICAL - ADULT  Goal: Achieves stable or improved neurological status  Description: INTERVENTIONS  - Assess for and report changes in neurological status  - Initiate measures to prevent increased intracranial pressure  - Maintain blood pressure and fluid volume within ordered parameters to optimize cerebral perfusion and minimize risk of hemorrhage  - Monitor temperature, glucose, and sodium. Initiate appropriate interventions as ordered  Outcome: Progressing     Problem: PAIN - ADULT  Goal: Verbalizes/displays adequate comfort level or patient's stated pain goal  Description: INTERVENTIONS:  - Encourage pt to monitor pain and request assistance  - Assess pain using appropriate pain scale  - Administer analgesics based on type and severity of pain and evaluate response  - Implement non-pharmacological measures as appropriate and evaluate response  - Consider cultural and social influences on pain and pain management  - Manage/alleviate anxiety  - Utilize distraction and/or relaxation techniques  - Monitor for opioid side effects  - Notify MD/LIP if interventions unsuccessful or patient reports new pain  - Anticipate increased pain with activity and pre-medicate as appropriate  Outcome: Progressing     Problem: RISK FOR INFECTION - ADULT  Goal: Absence of fever/infection during  anticipated neutropenic period  Description: INTERVENTIONS  - Monitor WBC  - Administer growth factors as ordered  - Implement neutropenic guidelines  Outcome: Progressing     Problem: RESPIRATORY - ADULT  Goal: Achieves optimal ventilation and oxygenation  Description: INTERVENTIONS:  - Assess for changes in respiratory status  - Assess for changes in mentation and behavior  - Position to facilitate oxygenation and minimize respiratory effort  - Oxygen supplementation based on oxygen saturation or ABGs  - Provide Smoking Cessation handout, if applicable  - Encourage broncho-pulmonary hygiene including cough, deep breathe, Incentive Spirometry  - Assess the need for suctioning and perform as needed  - Assess and instruct to report SOB or any respiratory difficulty  - Respiratory Therapy support as indicated  - Manage/alleviate anxiety  - Monitor for signs/symptoms of CO2 retention  Outcome: Progressing     Problem: CARDIOVASCULAR - ADULT  Goal: Maintains optimal cardiac output and hemodynamic stability  Description: INTERVENTIONS:  - Monitor vital signs, rhythm, and trends  - Monitor for bleeding, hypotension and signs of decreased cardiac output  - Evaluate effectiveness of vasoactive medications to optimize hemodynamic stability  - Monitor arterial and/or venous puncture sites for bleeding and/or hematoma  - Assess quality of pulses, skin color and temperature  - Assess for signs of decreased coronary artery perfusion - ex. Angina  - Evaluate fluid balance, assess for edema, trend weights  Outcome: Progressing  Goal: Absence of cardiac arrhythmias or at baseline  Description: INTERVENTIONS:  - Continuous cardiac monitoring, monitor vital signs, obtain 12 lead EKG if indicated  - Evaluate effectiveness of antiarrhythmic and heart rate control medications as ordered  - Initiate emergency measures for life threatening arrhythmias  - Monitor electrolytes and administer replacement therapy as ordered  Outcome:  Progressing     Problem: Safety Risk - Non-Violent Restraints  Goal: Patient will remain free from self-harm  Description: INTERVENTIONS:  - Apply the least restrictive restraint to prevent harm  - Notify patient and family of reasons restraints applied  - Assess for any contributing factors to confusion (electrolyte disturbances, delirium, medications)  - Discontinue any unnecessary medical devices as soon as possible  - Assess the patient's physical comfort, circulation, skin condition, hydration, nutrition and elimination needs   - Reorient and redirection as needed  - Assess for the need to continue restraints  Outcome: Progressing     Problem: Diabetes/Glucose Control  Goal: Glucose maintained within prescribed range  Description: INTERVENTIONS:  - Monitor Blood Glucose as ordered  - Assess for signs and symptoms of hyperglycemia and hypoglycemia  - Administer ordered medications to maintain glucose within target range  - Assess barriers to adequate nutritional intake and initiate nutrition consult as needed  - Instruct patient on self management of diabetes  Outcome: Progressing     Patient alert and following commands. Weaned to 3 L High Flow nasal cannula. Up to chair for meals today. Tolerating regular diet with thin liquids and no straws. Safety precautions in place, frequent nursing rounding completed, call light within reach. All questions answered and needs met.

## 2024-01-03 NOTE — PLAN OF CARE
Alert x3. Denies pain or discomfort. IV zosyn. Restraints dc'd. Patient not attempting to take off any equipment. Dc to claudine when cleared. Patient agrees to claudine  Problem: SAFETY ADULT - FALL  Goal: Free from fall injury  Description: INTERVENTIONS:  - Assess pt frequently for physical needs  - Identify cognitive and physical deficits and behaviors that affect risk of falls.  - Reform fall precautions as indicated by assessment.  - Educate pt/family on patient safety including physical limitations  - Instruct pt to call for assistance with activity based on assessment  - Modify environment to reduce risk of injury  - Provide assistive devices as appropriate  - Consider OT/PT consult to assist with strengthening/mobility  - Encourage toileting schedule  Outcome: Progressing     Problem: NEUROLOGICAL - ADULT  Goal: Achieves stable or improved neurological status  Description: INTERVENTIONS  - Assess for and report changes in neurological status  - Initiate measures to prevent increased intracranial pressure  - Maintain blood pressure and fluid volume within ordered parameters to optimize cerebral perfusion and minimize risk of hemorrhage  - Monitor temperature, glucose, and sodium. Initiate appropriate interventions as ordered  Outcome: Progressing     Problem: PAIN - ADULT  Goal: Verbalizes/displays adequate comfort level or patient's stated pain goal  Description: INTERVENTIONS:  - Encourage pt to monitor pain and request assistance  - Assess pain using appropriate pain scale  - Administer analgesics based on type and severity of pain and evaluate response  - Implement non-pharmacological measures as appropriate and evaluate response  - Consider cultural and social influences on pain and pain management  - Manage/alleviate anxiety  - Utilize distraction and/or relaxation techniques  - Monitor for opioid side effects  - Notify MD/LIP if interventions unsuccessful or patient reports new pain  - Anticipate increased  pain with activity and pre-medicate as appropriate  Outcome: Progressing     Problem: RISK FOR INFECTION - ADULT  Goal: Absence of fever/infection during anticipated neutropenic period  Description: INTERVENTIONS  - Monitor WBC  - Administer growth factors as ordered  - Implement neutropenic guidelines  Outcome: Progressing     Problem: DISCHARGE PLANNING  Goal: Discharge to home or other facility with appropriate resources  Description: INTERVENTIONS:  - Identify barriers to discharge w/pt and caregiver  - Include patient/family/discharge partner in discharge planning  - Arrange for needed discharge resources and transportation as appropriate  - Identify discharge learning needs (meds, wound care, etc)  - Arrange for interpreters to assist at discharge as needed  - Consider post-discharge preferences of patient/family/discharge partner  - Complete POLST form as appropriate  - Assess patient's ability to be responsible for managing their own health  - Refer to Case Management Department for coordinating discharge planning if the patient needs post-hospital services based on physician/LIP order or complex needs related to functional status, cognitive ability or social support system  Outcome: Progressing     Problem: RESPIRATORY - ADULT  Goal: Achieves optimal ventilation and oxygenation  Description: INTERVENTIONS:  - Assess for changes in respiratory status  - Assess for changes in mentation and behavior  - Position to facilitate oxygenation and minimize respiratory effort  - Oxygen supplementation based on oxygen saturation or ABGs  - Provide Smoking Cessation handout, if applicable  - Encourage broncho-pulmonary hygiene including cough, deep breathe, Incentive Spirometry  - Assess the need for suctioning and perform as needed  - Assess and instruct to report SOB or any respiratory difficulty  - Respiratory Therapy support as indicated  - Manage/alleviate anxiety  - Monitor for signs/symptoms of CO2  retention  Outcome: Progressing     Problem: Patient Centered Care  Goal: Patient preferences are identified and integrated in the patient's plan of care  Description: Interventions:  - What would you like us to know as we care for you? From home with wife   - Provide timely, complete, and accurate information to patient/family  - Incorporate patient and family knowledge, values, beliefs, and cultural backgrounds into the planning and delivery of care  - Encourage patient/family to participate in care and decision-making at the level they choose  - Honor patient and family perspectives and choices  Outcome: Progressing     Problem: CARDIOVASCULAR - ADULT  Goal: Maintains optimal cardiac output and hemodynamic stability  Description: INTERVENTIONS:  - Monitor vital signs, rhythm, and trends  - Monitor for bleeding, hypotension and signs of decreased cardiac output  - Evaluate effectiveness of vasoactive medications to optimize hemodynamic stability  - Monitor arterial and/or venous puncture sites for bleeding and/or hematoma  - Assess quality of pulses, skin color and temperature  - Assess for signs of decreased coronary artery perfusion - ex. Angina  - Evaluate fluid balance, assess for edema, trend weights  Outcome: Progressing  Goal: Absence of cardiac arrhythmias or at baseline  Description: INTERVENTIONS:  - Continuous cardiac monitoring, monitor vital signs, obtain 12 lead EKG if indicated  - Evaluate effectiveness of antiarrhythmic and heart rate control medications as ordered  - Initiate emergency measures for life threatening arrhythmias  - Monitor electrolytes and administer replacement therapy as ordered  Outcome: Progressing     Problem: Safety Risk - Non-Violent Restraints  Goal: Patient will remain free from self-harm  Description: INTERVENTIONS:  - Apply the least restrictive restraint to prevent harm  - Notify patient and family of reasons restraints applied  - Assess for any contributing factors to  confusion (electrolyte disturbances, delirium, medications)  - Discontinue any unnecessary medical devices as soon as possible  - Assess the patient's physical comfort, circulation, skin condition, hydration, nutrition and elimination needs   - Reorient and redirection as needed  - Assess for the need to continue restraints  Outcome: Progressing     Problem: Diabetes/Glucose Control  Goal: Glucose maintained within prescribed range  Description: INTERVENTIONS:  - Monitor Blood Glucose as ordered  - Assess for signs and symptoms of hyperglycemia and hypoglycemia  - Administer ordered medications to maintain glucose within target range  - Assess barriers to adequate nutritional intake and initiate nutrition consult as needed  - Instruct patient on self management of diabetes  Outcome: Progressing     Problem: Patient/Family Goals  Goal: Patient/Family Long Term Goal  Description: Patient's Long Term Goal: To discharge from hospital     Interventions:  -  Monitor vital signs   - Monitor appropriate labs   -  Monitor blood glucose levels   - Pain management   - Administer medications per order   - Follow MD orders   - Diagnostics per order   - Update/inform patient and family on plan of care   - Discharge planning    - See additional Care Plan goals for specific interventions  Outcome: Progressing  Goal: Patient/Family Short Term Goal  Description: Patient's Short Term Goal: To improve strength     Interventions:   - -  Monitor vital signs   - Monitor appropriate labs   -  Monitor blood glucose levels   - Pain management   - Administer medications per order   - Follow MD orders   - Diagnostics per order   - Update/inform patient and family on plan of care   - See additional Care Plan goals for specific interventions  Outcome: Progressing

## 2024-01-03 NOTE — CM/SW NOTE
PT/OT recommending [facility] at [facility]. [facility] referrals sent by FLORENTIN 12/28. ANURAG search reopened in [facility], updates sent. List of accepting facilities will be needed for choice.    [facility] level [facility]    Plan: [facility] pending facility choice and medical clearance.    Dung Stone, CATIEN    [phone]

## 2024-01-03 NOTE — PROGRESS NOTES
[facility]    Progress Note    Ronn Blank Patient Status:  Inpatient    1950 MRN [id number]   Location [facility]5W Attending Oziel Sanders MD   Hosp Day # 6 PCP OZIEL SANDERS MD     Chief Complaint: fall    Subjective:     Constitutional:  Positive for activity change, appetite change and fatigue.   Respiratory:  Positive for cough, chest tightness and shortness of breath.    Cardiovascular:  Negative for chest pain and palpitations.   Gastrointestinal:  Negative for abdominal pain.   Allergic/Immunologic: Negative.    Neurological: Negative.    Hematological: Negative.    Psychiatric/Behavioral: Negative.     Pt more alert today, wife at bedside. Restraints removed.     Objective:   Blood pressure 133/87, pulse 104, temperature 98.3 °F (36.8 °C), temperature source Oral, resp. rate 20, height 5' 8\" (1.727 m), weight 178 lb 9.6 oz (81 kg), SpO2 94%.  Physical Exam  Constitutional:       General: He is not in acute distress.     Appearance: He is ill-appearing.   Cardiovascular:      Rate and Rhythm: Normal rate and regular rhythm.   Pulmonary:      Breath sounds: Rales present.   Abdominal:      General: Bowel sounds are normal.   Skin:     General: Skin is warm.   Neurological:      General: No focal deficit present.      Mental Status: He is alert.   Psychiatric:         Mood and Affect: Mood normal.         Thought Content: Thought content normal.         Results:   Lab Results   Component Value Date    WBC 8.9 2024    HGB 12.5 (L) 2024    HCT 37.6 (L) 2024    .0 2024    CREATSERUM 0.81 2024    BUN 15 2024     2024    K 3.8 2024     2024    CO2 25.0 2024     (H) 2024    CA 8.8 2024    ALB 4.3 2023    ALKPHO 60 2023    BILT 0.8 2023    TP 7.5 2023    AST 39 (H) 2023    ALT 33 2023    T4F 0.81 2018    TSH 1.842 2023    PSA 1.0  06/08/2018    DDIMER 0.94 (H) 12/27/2023    TROPHS 10 12/27/2023     10/28/2022    B12 709 06/08/2018       No results found.        Assessment & Plan:   *AMS  improving  MRI with no acute findings  Neurology following     *Fever/Tremors  resolved  RRT called 12/28 for shivering/tremor like activities  Transferred to CCU, now stable  Continuous EEG did not show any seizure     *Syncope, near  CT spine and brain negative for acute process  Troponin negative   Fall precautions  Monitor     *Pulmonary consolidation   CT chest: ground glass opacity and pulmonary consolidation most likely representing pulmonary infection  No PE  D-dimer 0.94  Now afebrile   Procal 0.37  On 2L O2  Cont rocephin and Zithromax   Pulmonology following      *Pulmonary nodule, suspicious per CT  No PE  CT chest: 2.1 x 1.8 cm pulmonary nodule concerning for primary pulmonary malignancy, f/u outpatient     *HLD  Cont rosuvastatin       DNR/full  DVT px: lovenox      **Certification      PHYSICIAN Certification of Need for Inpatient Hospitalization - Initial Certification    Patient will require inpatient services that will reasonably be expected to span two midnight's based on the clinical documentation in H+P.   Based on patients current state of illness, I anticipate that, after discharge, patient will require TBD.      Robert Velasquez MD  1/3/2024

## 2024-01-03 NOTE — PLAN OF CARE
Pt alert and vitals signs stable. Pt denies being short of breath, intermittent cough noted. Antibiotics infused. Pt repostioned. Fall precautions in place. Frequent rounding done .   Problem: SAFETY ADULT - FALL  Goal: Free from fall injury  Description: INTERVENTIONS:  - Assess pt frequently for physical needs  - Identify cognitive and physical deficits and behaviors that affect risk of falls.  - [place] fall precautions as indicated by assessment.  - Educate pt/family on patient safety including physical limitations  - Instruct pt to call for assistance with activity based on assessment  - Modify environment to reduce risk of injury  - Provide assistive devices as appropriate  - Consider OT/PT consult to assist with strengthening/mobility  - Encourage toileting schedule  Outcome: Progressing     Problem: NEUROLOGICAL - ADULT  Goal: Achieves stable or improved neurological status  Description: INTERVENTIONS  - Assess for and report changes in neurological status  - Initiate measures to prevent increased intracranial pressure  - Maintain blood pressure and fluid volume within ordered parameters to optimize cerebral perfusion and minimize risk of hemorrhage  - Monitor temperature, glucose, and sodium. Initiate appropriate interventions as ordered  Outcome: Progressing     Problem: PAIN - ADULT  Goal: Verbalizes/displays adequate comfort level or patient's stated pain goal  Description: INTERVENTIONS:  - Encourage pt to monitor pain and request assistance  - Assess pain using appropriate pain scale  - Administer analgesics based on type and severity of pain and evaluate response  - Implement non-pharmacological measures as appropriate and evaluate response  - Consider cultural and social influences on pain and pain management  - Manage/alleviate anxiety  - Utilize distraction and/or relaxation techniques  - Monitor for opioid side effects  - Notify MD/LIP if interventions unsuccessful or patient reports new pain  -  Anticipate increased pain with activity and pre-medicate as appropriate  Outcome: Progressing     Problem: RISK FOR INFECTION - ADULT  Goal: Absence of fever/infection during anticipated neutropenic period  Description: INTERVENTIONS  - Monitor WBC  - Administer growth factors as ordered  - Implement neutropenic guidelines  Outcome: Progressing     Problem: DISCHARGE PLANNING  Goal: Discharge to home or other facility with appropriate resources  Description: INTERVENTIONS:  - Identify barriers to discharge w/pt and caregiver  - Include patient/family/discharge partner in discharge planning  - Arrange for needed discharge resources and transportation as appropriate  - Identify discharge learning needs (meds, wound care, etc)  - Arrange for interpreters to assist at discharge as needed  - Consider post-discharge preferences of patient/family/discharge partner  - Complete POLST form as appropriate  - Assess patient's ability to be responsible for managing their own health  - Refer to Case Management Department for coordinating discharge planning if the patient needs post-hospital services based on physician/LIP order or complex needs related to functional status, cognitive ability or social support system  Outcome: Progressing     Problem: RESPIRATORY - ADULT  Goal: Achieves optimal ventilation and oxygenation  Description: INTERVENTIONS:  - Assess for changes in respiratory status  - Assess for changes in mentation and behavior  - Position to facilitate oxygenation and minimize respiratory effort  - Oxygen supplementation based on oxygen saturation or ABGs  - Provide Smoking Cessation handout, if applicable  - Encourage broncho-pulmonary hygiene including cough, deep breathe, Incentive Spirometry  - Assess the need for suctioning and perform as needed  - Assess and instruct to report SOB or any respiratory difficulty  - Respiratory Therapy support as indicated  - Manage/alleviate anxiety  - Monitor for signs/symptoms  of CO2 retention  Outcome: Progressing     Problem: Patient Centered Care  Goal: Patient preferences are identified and integrated in the patient's plan of care  Description: Interventions:  - What would you like us to know as we care for you? From home with wife   - Provide timely, complete, and accurate information to patient/family  - Incorporate patient and family knowledge, values, beliefs, and cultural backgrounds into the planning and delivery of care  - Encourage patient/family to participate in care and decision-making at the level they choose  - Honor patient and family perspectives and choices  Outcome: Progressing     Problem: CARDIOVASCULAR - ADULT  Goal: Maintains optimal cardiac output and hemodynamic stability  Description: INTERVENTIONS:  - Monitor vital signs, rhythm, and trends  - Monitor for bleeding, hypotension and signs of decreased cardiac output  - Evaluate effectiveness of vasoactive medications to optimize hemodynamic stability  - Monitor arterial and/or venous puncture sites for bleeding and/or hematoma  - Assess quality of pulses, skin color and temperature  - Assess for signs of decreased coronary artery perfusion - ex. Angina  - Evaluate fluid balance, assess for edema, trend weights  Outcome: Progressing  Goal: Absence of cardiac arrhythmias or at baseline  Description: INTERVENTIONS:  - Continuous cardiac monitoring, monitor vital signs, obtain 12 lead EKG if indicated  - Evaluate effectiveness of antiarrhythmic and heart rate control medications as ordered  - Initiate emergency measures for life threatening arrhythmias  - Monitor electrolytes and administer replacement therapy as ordered  Outcome: Progressing     Problem: Safety Risk - Non-Violent Restraints  Goal: Patient will remain free from self-harm  Description: INTERVENTIONS:  - Apply the least restrictive restraint to prevent harm  - Notify patient and family of reasons restraints applied  - Assess for any contributing  factors to confusion (electrolyte disturbances, delirium, medications)  - Discontinue any unnecessary medical devices as soon as possible  - Assess the patient's physical comfort, circulation, skin condition, hydration, nutrition and elimination needs   - Reorient and redirection as needed  - Assess for the need to continue restraints  Outcome: Progressing     Problem: Diabetes/Glucose Control  Goal: Glucose maintained within prescribed range  Description: INTERVENTIONS:  - Monitor Blood Glucose as ordered  - Assess for signs and symptoms of hyperglycemia and hypoglycemia  - Administer ordered medications to maintain glucose within target range  - Assess barriers to adequate nutritional intake and initiate nutrition consult as needed  - Instruct patient on self management of diabetes  Outcome: Progressing     Problem: Patient/Family Goals  Goal: Patient/Family Long Term Goal  Description: Patient's Long Term Goal: To discharge from hospital     Interventions:  -  Monitor vital signs   - Monitor appropriate labs   -  Monitor blood glucose levels   - Pain management   - Administer medications per order   - Follow MD orders   - Diagnostics per order   - Update/inform patient and family on plan of care   - Discharge planning    - See additional Care Plan goals for specific interventions  Outcome: Progressing  Goal: Patient/Family Short Term Goal  Description: Patient's Short Term Goal: To improve strength     Interventions:   - -  Monitor vital signs   - Monitor appropriate labs   -  Monitor blood glucose levels   - Pain management   - Administer medications per order   - Follow MD orders   - Diagnostics per order   - Update/inform patient and family on plan of care   - See additional Care Plan goals for specific interventions  Outcome: Progressing

## 2024-01-03 NOTE — PHYSICAL THERAPY NOTE
PHYSICAL THERAPY TREATMENT NOTE - INPATIENT     Room Number: 511/511-A       Presenting Problem: syncope-fall out of bed, increased confusion    Problem List  Principal Problem:    Syncope, near  Active Problems:    Hyponatremia    Dehydration    Shivering    Altered mental status    Toxic encephalopathy      PHYSICAL THERAPY ASSESSMENT   Chart reviewed. RN  approved participation in physical therapy.  PPE worn by therapist: mask, gloves, and goggles. Patient was wearing a mask during session. Patient presented in bed with 6/10 pain. Patient with good  progress towards goals during this session. Education provided on Physical therapy plan of care and physiological benefits of out of bed mobility. Patient with good carryover.     Bed mobility: Mod assist  Transfers: Mod assist  Gait Assistance: Moderate assistance  Distance (ft): 5-6 small side steps  Assistive Device:  (holding on PTA)  Pattern: Shuffle          Pt seen daily.Mod a for bed mobility and transfer.EOB sitting balance activity with emphasis on core stabilization .Pt educated on deep breathing and relaxation technique.Family present;family education;all questions  and concerns addressed.Pt amb 5 -6 small side steps bed to chair with mod a holding on PTA.Cuing for technique provided.There ex.Patient was left in bedside chair at end of session with all needs in reach. The patient's Approx Degree of Impairment: 64.91% has been calculated based on documentation in the [facility] '6 clicks' Inpatient Basic Mobility Short Form.  Research supports that patients with this level of impairment may benefit from Subacute Rehab. Sub-acute Rehabilitation.. RN aware of patient status post session.    DISCHARGE RECOMMENDATIONS  PT Discharge Recommendations: Sub-acute rehabilitation     PLAN  PT Treatment Plan: Bed mobility;Body mechanics;Endurance;Energy conservation;Patient education;Family education;Gait training;Strengthening;Range of motion;Balance training;Transfer  training    SUBJECTIVE  Pt reports being ready for PT RX    OBJECTIVE  Precautions: Bed/chair alarm    WEIGHT BEARING RESTRICTION  Weight Bearing Restriction: None                PAIN ASSESSMENT   Ratin          BALANCE                                                                                                                       Static Sitting: Good  Dynamic Sitting: Fair +           Static Standing: Fair -  Dynamic Standing: Poor +    ACTIVITY TOLERANCE                         O2 WALK       AM-PAC '6-Clicks' INPATIENT SHORT FORM - BASIC MOBILITY  How much difficulty does the patient currently have...  Patient Difficulty: Turning over in bed (including adjusting bedclothes, sheets and blankets)?: A Little   Patient Difficulty: Sitting down on and standing up from a chair with arms (e.g., wheelchair, bedside commode, etc.): A Lot   Patient Difficulty: Moving from lying on back to sitting on the side of the bed?: A Lot   How much help from another person does the patient currently need...   Help from Another: Moving to and from a bed to a chair (including a wheelchair)?: A Lot   Help from Another: Need to walk in hospital room?: A Lot   Help from Another: Climbing 3-5 steps with a railing?: A Lot     AM-PAC Score:  Raw Score: 13   Approx Degree of Impairment: 64.91%   Standardized Score (AM-PAC Scale): 36.74   CMS Modifier (G-Code): CL      Additional information:     THERAPEUTIC EXERCISES  Lower Extremity Ankle pumps  Glut sets  Quad sets     Position Supine       Patient End of Session: In bed;Needs met;Call light within reach;RN aware of session/findings;Restraints;Alarm set;Family present    CURRENT GOALS     Patient Goal Patient's self-stated goal is: did not describe   Goal #1 Patient is able to demonstrate supine - sit EOB @ level: Supervision     Goal #1   Current Status Mod a   Goal #2 Patient is able to demonstrate transfers Sit to/from Stand at assistance level: supervision with walker -  rolling     Goal #2  Current Status Mod a   Goal #3 Patient is able to ambulate 150 feet with assist device: walker - rolling at assistance level: CG   Goal #3   Current Status Pt amb 5-6 small side steps bed to chair mod a   Goal #4 Patient will negotiate 5 stairs/one curb w/ assistive device and supervision   Goal #4   Current Status NT   Goal #5 Patient to demonstrate independence with home activity/exercise instructions provided to patient in preparation for discharge.   Goal #5   Current Status In progress   There ex-10 minutes; There activity-20 minutes.

## 2024-01-04 VITALS
TEMPERATURE: 98 F | SYSTOLIC BLOOD PRESSURE: 105 MMHG | DIASTOLIC BLOOD PRESSURE: 76 MMHG | OXYGEN SATURATION: 94 % | HEIGHT: 68 IN | BODY MASS INDEX: 27.07 KG/M2 | WEIGHT: 178.63 LBS | RESPIRATION RATE: 20 BRPM | HEART RATE: 95 BPM

## 2024-01-04 LAB
GLUCOSE BLDC GLUCOMTR-MCNC: 129 MG/DL (ref 70–99)
GLUCOSE BLDC GLUCOMTR-MCNC: 202 MG/DL (ref 70–99)

## 2024-01-04 PROCEDURE — 99232 SBSQ HOSP IP/OBS MODERATE 35: CPT | Performed by: INTERNAL MEDICINE

## 2024-01-04 RX ORDER — CARVEDILOL 6.25 MG/1
6.25 TABLET ORAL 2 TIMES DAILY WITH MEALS
Status: SHIPPED | COMMUNITY
Start: 2024-01-04

## 2024-01-04 RX ORDER — LORAZEPAM 1 MG/1
1 TABLET ORAL EVERY 6 HOURS PRN
Qty: 30 TABLET | Refills: 0 | Status: SHIPPED | OUTPATIENT
Start: 2024-01-04

## 2024-01-04 NOTE — PROGRESS NOTES
[facility]     Progress Note    Ronn Blank Patient Status:  Inpatient    1950 MRN [id number]   Location [facility] 2W/SW Attending Oziel Sanders MD   Hosp Day # 7 PCP OZIEL SANDERS MD       Subjective:   Patient seen and examined.  Resting in bed.  Arousable this morning.  Some occasional cough present.  Denies significant dyspnea    Objective:   Blood pressure 112/79, pulse 100, temperature 98.1 °F (36.7 °C), temperature source Oral, resp. rate 20, height 5' 8\" (1.727 m), weight 178 lb 9.6 oz (81 kg), SpO2 94%.  Intake/Output:   Last 3 shifts: I/O last 3 completed shifts:  In: 540 [P.O.:340; IV PIGGYBACK:200]  Out: 1350 [Urine:1350]   This shift: No intake/output data recorded.     Vent Settings:      Hemodynamic parameters (last 24 hours):      Scheduled Meds:         Continuous Infusions:         Physical Exam  Constitutional: Somnolent but arousable  Eyes: PERRL  ENT: nares pateint  Neck: supple, no JVD  Cardio: RRR, S1 S2  Respiratory: Basilar crackles  GI: abdomen soft, non tender, active bowel sounds, no organomegaly  Extremities: no clubbing, cyanosis, edema  Neurologic: no gross motor deficits  Skin: warm, dry      Results:            No results found.          Assessment   1.  Status post fall  2.  Groundglass lung opacities  3.  Right upper lobe lung nodule  4.  Mediastinal/hilar lymphadenopathy  5.  Toxic metabolic encephalopathy  6.  Acute hypoxemic respiratory failure       Plan   -Patient presents status post fall.  -CT head with no acute intracranial abnormality seen  -CT chest with area of groundglass opacities most pronounced right lower lobe concerning for underlying infectious process.  Spiculated 2.1 cm right upper lobe nodularity concerning for malignancy.  Some evidence of mediastinal and hilar lymphadenopathy also noted.  Discussed findings with wife.  Recommend outpatient PET/CT within the next month if patient stable given concern for  malignancy.  -Empiric IV antibiotic therapy at this time with Rocephin and azithromycin for presumed pneumonia.  Complete 7-day course of Zosyn.-Wean oxygen as tolerated.  -DVT prophylaxis: SCDs  -Okay for discharge from pulmonary perspective.  Okay to discontinue antibiotic therapy upon discharge.  Follow-up in pulmonary clinic in 2 to 3 weeks to follow-up with lung nodule    Chester Katz,   Pulmonary Critical Care Medicine  [facility]

## 2024-01-04 NOTE — CM/SW NOTE
Patient discussed in rounds, cleared to [place] today. Message sent to [facility] requested bed, awaiting review and response.       1151am   Bed avail per Luh at [facility]. SW called and notified spouse, Yohana of 4p  time, Yohana in agreement.       PLAN: [facility] ETA is 4pm. PCS DONE. Rm 2401, call for report is [phone].    Khushbu Stone, MSW, LSW    [id number]

## 2024-01-04 NOTE — PHYSICAL THERAPY NOTE
PHYSICAL THERAPY TREATMENT NOTE - INPATIENT     Room Number: 511/511-A       Presenting Problem: syncope-fall out of bed, increased confusion    Problem List  Principal Problem:    Syncope, near  Active Problems:    Hyponatremia    Dehydration    Shivering    Altered mental status    Toxic encephalopathy      PHYSICAL THERAPY ASSESSMENT   Chart reviewed. RN  approved participation in physical therapy.  PPE worn by therapist: mask, gloves, and goggles. Patient was wearing a mask during session. Patient presented in bed with 6/10 pain. Patient with good  progress towards goals during this session. Education provided on Physical therapy plan of care and physiological benefits of out of bed mobility. Patient with good carryover.     Bed mobility: Mod assist  Transfers: Mod assist  Gait Assistance: Minimum assistance  Distance (ft): 2 x 30  Assistive Device: Rolling walker  Pattern: Shuffle          Pt seen daily.Mod a for bed mobility and transfer.EOB sitting balance activity with emphasis on core stabilization .Pt educated on deep breathing and relaxation technique.Family present;family education;all questions  and concerns addressed.Pt amb 2 x 30 ft with RW and Min a.Cuing for gait pattern as well as for postural awareness..There ex.Patient was left in bedside chair at end of session with all needs in reach. The patient's Approx Degree of Impairment: 64.91% has been calculated based on documentation in the [facility] '6 clicks' Inpatient Basic Mobility Short Form.  Research supports that patients with this level of impairment may benefit from Subacute Rehab. Sub-acute Rehabilitation.. RN aware of patient status post session.    DISCHARGE RECOMMENDATIONS  PT Discharge Recommendations: Sub-acute rehabilitation     PLAN  PT Treatment Plan: Bed mobility;Body mechanics;Endurance;Gait training    SUBJECTIVE  Pt reports being ready for PT RX    OBJECTIVE  Precautions: Bed/chair alarm    WEIGHT BEARING RESTRICTION  Weight Bearing  Restriction: None                PAIN ASSESSMENT   Ratin          BALANCE                                                                                                                       Static Sitting: Good  Dynamic Sitting: Fair +           Static Standing: Fair -  Dynamic Standing: Poor +    ACTIVITY TOLERANCE                         O2 WALK       AM-PAC '6-Clicks' INPATIENT SHORT FORM - BASIC MOBILITY  How much difficulty does the patient currently have...  Patient Difficulty: Turning over in bed (including adjusting bedclothes, sheets and blankets)?: A Little   Patient Difficulty: Sitting down on and standing up from a chair with arms (e.g., wheelchair, bedside commode, etc.): A Lot   Patient Difficulty: Moving from lying on back to sitting on the side of the bed?: A Lot   How much help from another person does the patient currently need...   Help from Another: Moving to and from a bed to a chair (including a wheelchair)?: A Lot   Help from Another: Need to walk in hospital room?: A Lot   Help from Another: Climbing 3-5 steps with a railing?: A Lot     AM-PAC Score:  Raw Score: 13   Approx Degree of Impairment: 64.91%   Standardized Score (AM-PAC Scale): 36.74   CMS Modifier (G-Code): CL      Additional information:     THERAPEUTIC EXERCISES  Lower Extremity Ankle pumps  Glut sets  Quad sets     Position Supine       Patient End of Session: In bed;Call light within reach;RN aware of session/findings;All patient questions and concerns addressed    CURRENT GOALS     Patient Goal Patient's self-stated goal is: did not describe   Goal #1 Patient is able to demonstrate supine - sit EOB @ level: Supervision     Goal #1   Current Status Mod a   Goal #2 Patient is able to demonstrate transfers Sit to/from Stand at assistance level: supervision with walker - rolling     Goal #2  Current Status Mod a   Goal #3 Patient is able to ambulate 150 feet with assist device: walker - rolling at assistance level: CG    Goal #3   Current Status Pt amb 2 x 30 ft with RW and Min a   Goal #4 Patient will negotiate 5 stairs/one curb w/ assistive device and supervision   Goal #4   Current Status NT   Goal #5 Patient to demonstrate independence with home activity/exercise instructions provided to patient in preparation for discharge.   Goal #5   Current Status In progress   Gait -10 minutes; There activity-20 minutes.

## 2024-01-04 NOTE — OCCUPATIONAL THERAPY NOTE
OCCUPATIONAL THERAPY TREATMENT NOTE - INPATIENT    Room Number: 511/511-A     Presenting Problem: Syncope; weakness     Problem List  Principal Problem:    Syncope, near  Active Problems:    Hyponatremia    Dehydration    Shivering    Altered mental status    Toxic encephalopathy      OCCUPATIONAL THERAPY ASSESSMENT   RN provided consent to proceed; pt up in chair with spouse present; pt much more alert and oriented compared to initial evaluation; pt motivated to work with OT; pt stood to RW with Min A; pt ambulatory bathroom distances with RW with Min A at slow shuffled pace; intermittent standing rest breaks and education provided for energy conservation and activity pacing; pt benefits from standing rest breaks and cues for safe management with RW; pt returned back to chair and left with all needs in reach; declined ADL tasks; pt stable at exit; Continue skilled occupational therapy while IP to maximize patient function and increase patient participation, safety, and independence with basic ADL and everyday activities. The patient's Approx Degree of Impairment: 59.67% has been calculated based on documentation in the [facility] '6 clicks' Inpatient Daily Activity Short Form.  Research supports that patients with this level of impairment may benefit from ANURAG.    DISCHARGE RECOMMENDATIONS  OT Discharge Recommendations: Sub-acute rehabilitation  OT Device Recommendations: TBD    PLAN  OT Treatment Plan: Balance activities;Energy conservation/work simplification techniques;ADL training;Functional transfer training;Endurance training;Patient/Family education;Equipment eval/education;Patient/Family training;Compensatory technique education    SUBJECTIVE  This is my wife; we have been together for 10 years     OBJECTIVE  Precautions: Bed/chair alarm    WEIGHT BEARING RESTRICTION     PAIN ASSESSMENT  Ratin           ACTIVITIES OF DAILY LIVING ASSESSMENT  AM-PAC ‘6-Clicks’ Inpatient Daily Activity Short Form  How much help  from another person does the patient currently need…  -   Putting on and taking off regular lower body clothing?: A Lot  -   Bathing (including washing, rinsing, drying)?: A Lot  -   Toileting, which includes using toilet, bedpan or urinal? : A Lot  -   Putting on and taking off regular upper body clothing?: A Lot  -   Taking care of personal grooming such as brushing teeth?: A Little  -   Eating meals?: A Little    AM-PAC Score:  Score: 14  Approx Degree of Impairment: 59.67%  Standardized Score (AM-PAC Scale): 33.39  CMS Modifier (G-Code): CK    EDUCATION PROVIDED  Patient : Role of Occupational Therapy; Discharge Recommendations; Plan of Care; Functional Transfer Techniques; Posture/Positioning; Compensatory ADL Techniques  Patient's Response to Education: Verbalized Understanding; Requires Further Education    Patient End of Session: Up in chair          OT Goals  Patient self-stated goal is: to return home     Patient will complete LE dressing with SBA  Comment: ongoing    Patient will complete toilet transfer with SBA   Comment: ongoing    Patient will complete self care task at sink level with SBA    Comment:ongoing     Comment:         Goals  on: 1/15  Frequency:3-5x week       OT Session Time: 23 minutes  Therapeutic Activity: 23 minutes      Suhail Means, Occupational Therapist, OTR/L ext [phone]

## 2024-01-04 NOTE — DISCHARGE SUMMARY
[facility]  Discharge Summary    Ronn Blank Patient Status:  Inpatient    1950 MRN [id number]   Location [facility]5W Attending Oziel Sanders MD   Hosp Day # 7 PCP OZIEL SANDERS MD     Date of Admission: 2023    Date of Discharge: 24      Admitting Diagnosis: Dehydration [E86.0]  Syncope, near [R55]    Discharge Diagnosis:   Patient Active Problem List   Diagnosis    BPH without obstruction/lower urinary tract symptoms    Hypogonadism male    Osteoporosis    Hyperglycemia    Hypercholesterolemia    Overweight    Heart murmur    Hyperlipidemia    Idiopathic osteoporosis    Pain of right hip joint    Lumbar disc disease with radiculopathy    Closed displaced transverse fracture of shaft of right femur (HCC)    Closed displaced transverse fracture of shaft of right femur with routine healing    Drug-induced constipation    Difficulty walking    Bilateral wrist pain    Arthritis of right wrist    Arthritis of wrist, left    Sensorineural hearing loss, bilateral    Anemia, unspecified    Essential (primary) hypertension    Repeated falls    Unspecified hearing loss, bilateral    Unspecified osteoarthritis, unspecified site    Chronic pain of both knees    Internal derangement of both knees    Primary osteoarthritis of both knees    Hyponatremia    Syncope, near    Dehydration    Shivering    Altered mental status    Toxic encephalopathy       Reason for Admission: ***    Physical Exam: {exam; complete normal and system select:47480}    History of Present Illness: ***    Hospital Course: ***    Consultations: ***    Procedures: ***    Complications: ***    Disposition: Home or Self Care    Discharge Condition: {DISCHARGE CONDITION:}    Discharge Medications:   Current Discharge Medication List        START taking these medications    Details   carvedilol 6.25 MG Oral Tab Take 1 tablet (6.25 mg total) by mouth 2 (two) times daily with meals.      LORazepam 1 MG Oral Tab  Take 1 tablet (1 mg total) by mouth every 6 (six) hours as needed for Anxiety.  Qty: 30 tablet, Refills: 0    Associated Diagnoses: Seizure-like activity (HCC)           CONTINUE these medications which have NOT CHANGED    Details   cholecalciferol (VITAMIN D3) 125 MCG (5000 UT) Oral Cap Take 1 capsule (5,000 Units total) by mouth daily.      Vitamin C 500 MG Oral Tab Take 1 tablet (500 mg total) by mouth daily.      cyanocobalamin 1000 MCG Oral Tab Take 1 tablet (1,000 mcg total) by mouth daily.      Omega-3-acid Ethyl Esters 1 g Oral Cap Take 1 capsule (1 g total) by mouth daily.      Multiple Vitamins-Minerals (QC MENS DAILY MULTIVITAMIN) Oral Tab Take by mouth.      niacin 500 MG Oral Tab Take 1 tablet (500 mg total) by mouth daily with breakfast.      rosuvastatin 10 MG Oral Tab Take 1 tablet (10 mg total) by mouth nightly.  Qty: 30 tablet, Refills: 1      fluticasone propionate 50 MCG/ACT Nasal Suspension 2 sprays by Nasal route daily.  Qty: 16 g, Refills: 5      acetaminophen 500mg Take 500 mg by mouth every 6 (six) hours as needed.               Follow up Visits: Follow-up with *** in {0-10:15300} {units:19995}    Follow up Labs: *** in {0-10:05039} {units:19995}    Other Discharge Instructions: ***    Robert Velasquez MD  1/4/2024  10:13 AM     (two) times daily with meals.      LORazepam 1 MG Oral Tab Take 1 tablet (1 mg total) by mouth every 6 (six) hours as needed for Anxiety.  Qty: 30 tablet, Refills: 0    Associated Diagnoses: Seizure-like activity (HCC)           CONTINUE these medications which have NOT CHANGED    Details   cholecalciferol (VITAMIN D3) 125 MCG (5000 UT) Oral Cap Take 1 capsule (5,000 Units total) by mouth daily.      Vitamin C 500 MG Oral Tab Take 1 tablet (500 mg total) by mouth daily.      cyanocobalamin 1000 MCG Oral Tab Take 1 tablet (1,000 mcg total) by mouth daily.      Omega-3-acid Ethyl Esters 1 g Oral Cap Take 1 capsule (1 g total) by mouth daily.      Multiple Vitamins-Minerals (QC MENS DAILY MULTIVITAMIN) Oral Tab Take by mouth.      niacin 500 MG Oral Tab Take 1 tablet (500 mg total) by mouth daily with breakfast.      rosuvastatin 10 MG Oral Tab Take 1 tablet (10 mg total) by mouth nightly.  Qty: 30 tablet, Refills: 1      fluticasone propionate 50 MCG/ACT Nasal Suspension 2 sprays by Nasal route daily.  Qty: 16 g, Refills: 5      acetaminophen 500mg Take 500 mg by mouth every 6 (six) hours as needed.               Follow up Visits: Follow-up with PCP in 1 week     Robert Velasquez MD  1/4/2024  10:13 AM

## 2024-01-04 NOTE — PLAN OF CARE
Problem: SAFETY ADULT - FALL  Goal: Free from fall injury  Description: INTERVENTIONS:  - Assess pt frequently for physical needs  - Identify cognitive and physical deficits and behaviors that affect risk of falls.  - [place]kinson fall precautions as indicated by assessment.  - Educate pt/family on patient safety including physical limitations  - Instruct pt to call for assistance with activity based on assessment  - Modify environment to reduce risk of injury  - Provide assistive devices as appropriate  - Consider OT/PT consult to assist with strengthening/mobility  - Encourage toileting schedule  Outcome: Completed     Problem: NEUROLOGICAL - ADULT  Goal: Achieves stable or improved neurological status  Description: INTERVENTIONS  - Assess for and report changes in neurological status  - Initiate measures to prevent increased intracranial pressure  - Maintain blood pressure and fluid volume within ordered parameters to optimize cerebral perfusion and minimize risk of hemorrhage  - Monitor temperature, glucose, and sodium. Initiate appropriate interventions as ordered  Outcome: Completed     Problem: PAIN - ADULT  Goal: Verbalizes/displays adequate comfort level or patient's stated pain goal  Description: INTERVENTIONS:  - Encourage pt to monitor pain and request assistance  - Assess pain using appropriate pain scale  - Administer analgesics based on type and severity of pain and evaluate response  - Implement non-pharmacological measures as appropriate and evaluate response  - Consider cultural and social influences on pain and pain management  - Manage/alleviate anxiety  - Utilize distraction and/or relaxation techniques  - Monitor for opioid side effects  - Notify MD/LIP if interventions unsuccessful or patient reports new pain  - Anticipate increased pain with activity and pre-medicate as appropriate  Outcome: Completed     Problem: RISK FOR INFECTION - ADULT  Goal: Absence of fever/infection during anticipated  neutropenic period  Description: INTERVENTIONS  - Monitor WBC  - Administer growth factors as ordered  - Implement neutropenic guidelines  Outcome: Completed     Problem: DISCHARGE PLANNING  Goal: Discharge to home or other facility with appropriate resources  Description: INTERVENTIONS:  - Identify barriers to discharge w/pt and caregiver  - Include patient/family/discharge partner in discharge planning  - Arrange for needed discharge resources and transportation as appropriate  - Identify discharge learning needs (meds, wound care, etc)  - Arrange for interpreters to assist at discharge as needed  - Consider post-discharge preferences of patient/family/discharge partner  - Complete POLST form as appropriate  - Assess patient's ability to be responsible for managing their own health  - Refer to Case Management Department for coordinating discharge planning if the patient needs post-hospital services based on physician/LIP order or complex needs related to functional status, cognitive ability or social support system  Outcome: Completed     Problem: RESPIRATORY - ADULT  Goal: Achieves optimal ventilation and oxygenation  Description: INTERVENTIONS:  - Assess for changes in respiratory status  - Assess for changes in mentation and behavior  - Position to facilitate oxygenation and minimize respiratory effort  - Oxygen supplementation based on oxygen saturation or ABGs  - Provide Smoking Cessation handout, if applicable  - Encourage broncho-pulmonary hygiene including cough, deep breathe, Incentive Spirometry  - Assess the need for suctioning and perform as needed  - Assess and instruct to report SOB or any respiratory difficulty  - Respiratory Therapy support as indicated  - Manage/alleviate anxiety  - Monitor for signs/symptoms of CO2 retention  Outcome: Completed     Problem: Patient Centered Care  Goal: Patient preferences are identified and integrated in the patient's plan of care  Description: Interventions:  -  What would you like us to know as we care for you? From home with wife   - Provide timely, complete, and accurate information to patient/family  - Incorporate patient and family knowledge, values, beliefs, and cultural backgrounds into the planning and delivery of care  - Encourage patient/family to participate in care and decision-making at the level they choose  - Honor patient and family perspectives and choices  Outcome: Completed     Problem: CARDIOVASCULAR - ADULT  Goal: Maintains optimal cardiac output and hemodynamic stability  Description: INTERVENTIONS:  - Monitor vital signs, rhythm, and trends  - Monitor for bleeding, hypotension and signs of decreased cardiac output  - Evaluate effectiveness of vasoactive medications to optimize hemodynamic stability  - Monitor arterial and/or venous puncture sites for bleeding and/or hematoma  - Assess quality of pulses, skin color and temperature  - Assess for signs of decreased coronary artery perfusion - ex. Angina  - Evaluate fluid balance, assess for edema, trend weights  Outcome: Completed  Goal: Absence of cardiac arrhythmias or at baseline  Description: INTERVENTIONS:  - Continuous cardiac monitoring, monitor vital signs, obtain 12 lead EKG if indicated  - Evaluate effectiveness of antiarrhythmic and heart rate control medications as ordered  - Initiate emergency measures for life threatening arrhythmias  - Monitor electrolytes and administer replacement therapy as ordered  Outcome: Completed     Problem: Safety Risk - Non-Violent Restraints  Goal: Patient will remain free from self-harm  Description: INTERVENTIONS:  - Apply the least restrictive restraint to prevent harm  - Notify patient and family of reasons restraints applied  - Assess for any contributing factors to confusion (electrolyte disturbances, delirium, medications)  - Discontinue any unnecessary medical devices as soon as possible  - Assess the patient's physical comfort, circulation, skin  condition, hydration, nutrition and elimination needs   - Reorient and redirection as needed  - Assess for the need to continue restraints  Outcome: Completed     Problem: Diabetes/Glucose Control  Goal: Glucose maintained within prescribed range  Description: INTERVENTIONS:  - Monitor Blood Glucose as ordered  - Assess for signs and symptoms of hyperglycemia and hypoglycemia  - Administer ordered medications to maintain glucose within target range  - Assess barriers to adequate nutritional intake and initiate nutrition consult as needed  - Instruct patient on self management of diabetes  Outcome: Completed     Problem: Patient/Family Goals  Goal: Patient/Family Long Term Goal  Description: Patient's Long Term Goal: To discharge from hospital     Interventions:  -  Monitor vital signs   - Monitor appropriate labs   -  Monitor blood glucose levels   - Pain management   - Administer medications per order   - Follow MD orders   - Diagnostics per order   - Update/inform patient and family on plan of care   - Discharge planning    - See additional Care Plan goals for specific interventions  Outcome: Completed  Goal: Patient/Family Short Term Goal  Description: Patient's Short Term Goal: To improve strength     Interventions:   - -  Monitor vital signs   - Monitor appropriate labs   -  Monitor blood glucose levels   - Pain management   - Administer medications per order   - Follow MD orders   - Diagnostics per order   - Update/inform patient and family on plan of care   - See additional Care Plan goals for specific interventions  Outcome: Completed     Weaned to room air. Patient denies pain at this time.   Per MD, patient cleared for discharge today.   Report given and care endorsed to SURAJ Brody at [facility].   Remote telemetry monitor returned.   Patient discharged to [facility]. Transport via [facility].

## 2024-01-04 NOTE — PLAN OF CARE
Received pt in stable condition. Continuous telemetry & pulse oximetry monitoring in place. Pt tolerated all scheduled medications, no complications noted. Pt on IV Zosyn Q 8 hrs. No c/o pain from pt. VSS, on 3 L O2 High flow nasal cannula, but O2 increased while pt sleeping d/t pt refusing his CPAP, and O2 dropping while pt sleeping. All fall & safety precautions in place for pt. Call light placed within pt.'s reach. Will continue to monitor for any new, acute changes.          Problem: SAFETY ADULT - FALL  Goal: Free from fall injury  Description: INTERVENTIONS:  - Assess pt frequently for physical needs  - Identify cognitive and physical deficits and behaviors that affect risk of falls.  - [place] fall precautions as indicated by assessment.  - Educate pt/family on patient safety including physical limitations  - Instruct pt to call for assistance with activity based on assessment  - Modify environment to reduce risk of injury  - Provide assistive devices as appropriate  - Consider OT/PT consult to assist with strengthening/mobility  - Encourage toileting schedule  Outcome: Progressing     Problem: NEUROLOGICAL - ADULT  Goal: Achieves stable or improved neurological status  Description: INTERVENTIONS  - Assess for and report changes in neurological status  - Initiate measures to prevent increased intracranial pressure  - Maintain blood pressure and fluid volume within ordered parameters to optimize cerebral perfusion and minimize risk of hemorrhage  - Monitor temperature, glucose, and sodium. Initiate appropriate interventions as ordered  Outcome: Progressing     Problem: PAIN - ADULT  Goal: Verbalizes/displays adequate comfort level or patient's stated pain goal  Description: INTERVENTIONS:  - Encourage pt to monitor pain and request assistance  - Assess pain using appropriate pain scale  - Administer analgesics based on type and severity of pain and evaluate response  - Implement non-pharmacological  measures as appropriate and evaluate response  - Consider cultural and social influences on pain and pain management  - Manage/alleviate anxiety  - Utilize distraction and/or relaxation techniques  - Monitor for opioid side effects  - Notify MD/LIP if interventions unsuccessful or patient reports new pain  - Anticipate increased pain with activity and pre-medicate as appropriate  Outcome: Progressing     Problem: RISK FOR INFECTION - ADULT  Goal: Absence of fever/infection during anticipated neutropenic period  Description: INTERVENTIONS  - Monitor WBC  - Administer growth factors as ordered  - Implement neutropenic guidelines  Outcome: Progressing     Problem: DISCHARGE PLANNING  Goal: Discharge to home or other facility with appropriate resources  Description: INTERVENTIONS:  - Identify barriers to discharge w/pt and caregiver  - Include patient/family/discharge partner in discharge planning  - Arrange for needed discharge resources and transportation as appropriate  - Identify discharge learning needs (meds, wound care, etc)  - Arrange for interpreters to assist at discharge as needed  - Consider post-discharge preferences of patient/family/discharge partner  - Complete POLST form as appropriate  - Assess patient's ability to be responsible for managing their own health  - Refer to Case Management Department for coordinating discharge planning if the patient needs post-hospital services based on physician/LIP order or complex needs related to functional status, cognitive ability or social support system  Outcome: Progressing     Problem: RESPIRATORY - ADULT  Goal: Achieves optimal ventilation and oxygenation  Description: INTERVENTIONS:  - Assess for changes in respiratory status  - Assess for changes in mentation and behavior  - Position to facilitate oxygenation and minimize respiratory effort  - Oxygen supplementation based on oxygen saturation or ABGs  - Provide Smoking Cessation handout, if applicable  -  Encourage broncho-pulmonary hygiene including cough, deep breathe, Incentive Spirometry  - Assess the need for suctioning and perform as needed  - Assess and instruct to report SOB or any respiratory difficulty  - Respiratory Therapy support as indicated  - Manage/alleviate anxiety  - Monitor for signs/symptoms of CO2 retention  Outcome: Progressing     Problem: Patient Centered Care  Goal: Patient preferences are identified and integrated in the patient's plan of care  Description: Interventions:  - What would you like us to know as we care for you? From home with wife   - Provide timely, complete, and accurate information to patient/family  - Incorporate patient and family knowledge, values, beliefs, and cultural backgrounds into the planning and delivery of care  - Encourage patient/family to participate in care and decision-making at the level they choose  - Honor patient and family perspectives and choices  Outcome: Progressing     Problem: CARDIOVASCULAR - ADULT  Goal: Maintains optimal cardiac output and hemodynamic stability  Description: INTERVENTIONS:  - Monitor vital signs, rhythm, and trends  - Monitor for bleeding, hypotension and signs of decreased cardiac output  - Evaluate effectiveness of vasoactive medications to optimize hemodynamic stability  - Monitor arterial and/or venous puncture sites for bleeding and/or hematoma  - Assess quality of pulses, skin color and temperature  - Assess for signs of decreased coronary artery perfusion - ex. Angina  - Evaluate fluid balance, assess for edema, trend weights  Outcome: Progressing  Goal: Absence of cardiac arrhythmias or at baseline  Description: INTERVENTIONS:  - Continuous cardiac monitoring, monitor vital signs, obtain 12 lead EKG if indicated  - Evaluate effectiveness of antiarrhythmic and heart rate control medications as ordered  - Initiate emergency measures for life threatening arrhythmias  - Monitor electrolytes and administer replacement  therapy as ordered  Outcome: Progressing     Problem: Diabetes/Glucose Control  Goal: Glucose maintained within prescribed range  Description: INTERVENTIONS:  - Monitor Blood Glucose as ordered  - Assess for signs and symptoms of hyperglycemia and hypoglycemia  - Administer ordered medications to maintain glucose within target range  - Assess barriers to adequate nutritional intake and initiate nutrition consult as needed  - Instruct patient on self management of diabetes  Outcome: Progressing     Problem: Patient/Family Goals  Goal: Patient/Family Long Term Goal  Description: Patient's Long Term Goal: To discharge from hospital     Interventions:  -  Monitor vital signs   - Monitor appropriate labs   -  Monitor blood glucose levels   - Pain management   - Administer medications per order   - Follow MD orders   - Diagnostics per order   - Update/inform patient and family on plan of care   - Discharge planning    - See additional Care Plan goals for specific interventions  Outcome: Progressing  Goal: Patient/Family Short Term Goal  Description: Patient's Short Term Goal: To improve strength     Interventions:   - -  Monitor vital signs   - Monitor appropriate labs   -  Monitor blood glucose levels   - Pain management   - Administer medications per order   - Follow MD orders   - Diagnostics per order   - Update/inform patient and family on plan of care   - See additional Care Plan goals for specific interventions  Outcome: Progressing

## 2024-01-08 ENCOUNTER — INITIAL APN SNF VISIT (OUTPATIENT)
Dept: INTERNAL MEDICINE CLINIC | Facility: SKILLED NURSING FACILITY | Age: 74
End: 2024-01-08
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
## 2024-01-08 DIAGNOSIS — R53.1 WEAKNESS: ICD-10-CM

## 2024-01-08 DIAGNOSIS — Z79.899 ENCOUNTER FOR MEDICATION REVIEW: ICD-10-CM

## 2024-01-08 DIAGNOSIS — R91.1 PULMONARY NODULE: ICD-10-CM

## 2024-01-08 DIAGNOSIS — J18.9 PNEUMONIA DUE TO INFECTIOUS ORGANISM, UNSPECIFIED LATERALITY, UNSPECIFIED PART OF LUNG: Primary | ICD-10-CM

## 2024-01-08 NOTE — PROGRESS NOTES
HPI: Ronn Blank  : 1950  Age 73 year old  male patient is admitted to [facility] for ANURAG    Reason for visit: Initial APRN assessment and f/u fall, pneumonia, recent Covid-19, weakness    [facility]: -  [facility]: -present     Dispo: Home w/ spouse post ANURAG     This is a 72 yo male w/ past medical hx significant for arthritis, HLD, OP; who presented to [facility] after a fall w/ confusion and reports of weakness.  Per spouse pt and her had Covid-19 around giving and since pt has been weak.  Imaging in ED negative for acute injury. CT chest significant for groundglass opacities and pulmonary nodule concerning for malignancy.  IV abx were started and pt was seen by pulmonology, Dr. Katz.  Plan for outpatoent PET scan post pneumonia resolution. During hospital stay RRT was callled due to seizure like activity and tachycardia.  Pt was seen by neurology, Dr. Chavez and continuous EEG was started,  Results were negative.  MRI brain also negative.  Once medically stable, pt was transferred to [facility] for ANURAG on .     Pt seen in , in no acute distress.  Sts he is feeling well and just had a shower for the first time in 2 weeks.  Denies pain.  Denies SOB or cough.  Sts he is participating w/ therapies and able to ambulate w/ a walker.  Tolerating diet.    Past Medical History:   Diagnosis Date    Arthritis     Erectile dysfunction     Hearing impairment     [place] - bilateral hearing aids    Hypercholesterolemia     Osteoporosis     PONV (postoperative nausea and vomiting)      Past Surgical History:   Procedure Laterality Date    COLONOSCOPY      DENTAL SURGERY PROCEDURE      extensive tental surgery - implants and bridges     FRACTURE SURGERY Right 2019    Right femur fracture percutaneous reduction with interlocking intramedullary long femoral nailing    HERNIA SURGERY      hernia repair after spinal surgery     SPINE SURGERY PROCEDURE UNLISTED       traumatic injury to L3 spinal surgery with metal     Family History   Problem Relation Age of Onset    Cancer Mother         breast    Cancer Sister         colon    Heart Disorder Brother     Other (Other) Other         osteoarthritis, osteoporosis     Social History     Socioeconomic History    Marital status:    Tobacco Use    Smoking status: Former     Packs/day: 1.00     Years: 20.00     Additional pack years: 0.00     Total pack years: 20.00     Types: Cigarettes     Quit date: 1996     Years since quittin.0    Smokeless tobacco: Never   Substance and Sexual Activity    Alcohol use: No     Alcohol/week: 0.0 standard drinks of alcohol    Drug use: No   Other Topics Concern    Caffeine Concern Yes     Comment: 2 cups coffee    Exercise Yes     Comment: aerobic, walking 3-4 times a week    Seat Belt No    Special Diet No    Stress Concern No    Weight Concern No     Social Determinants of Health     Food Insecurity: No Food Insecurity (2023)    Food Insecurity     Food Insecurity: Never true   Transportation Needs: No Transportation Needs (2023)    Transportation Needs     Lack of Transportation: No   Housing Stability: Low Risk  (2023)    Housing Stability     Housing Instability: No       ALLERGIES:  Allergies   Allergen Reactions    Sulfa Antibiotics RASH       CODE STATUS:  Full Code    CURRENT MEDICATIONS: Reviewed on SNF EMR     SUBJECTIVE/ROS:  GENERAL HEALTH:feels well otherwise  HEENT:no visual complaints or deficits  denies nasal congestion, sinus pain or sore throat;  RESPIRATORY: denies shortness of breath, wheezing or cough   CARDIOVASCULAR:denies chest pain, no palpitations   GI: denies nausea, vomiting, constipation, diarrhea; no rectal bleeding; no heartburn  MUSCULOSKELETAL:no joint complaints upper or lower extremities  NEURO:denies seizures  PSYCHE: no symptoms of depression or anxiety  HEMATOLOGY:denies excessive bleeding  ENDOCRINE: denies excessive  thirst or urination; denies unexpected wt gain or wt loss  SKIN: denies any unusual skin lesions or rashes      OBJECTIVE:  VITALS: Reviewed   LABS/Imaging: Drawn today: results pending   PHYSICAL EXAM:  GENERAL HEALTH: well developed, well nourished, in no apparent distress  HEENT: atraumatic/normocephalic, mucous membranes pink and moist, EOMI, sclera anicteric, conjunctiva normal.   RESPIRATORY: clear  CARDIOVASCULAR: regular 2  ABDOMEN:  normal active BS+, soft, non distended, nontender   LYMPHATIC:no lymphedema  MUSCULOSKELETAL: no acute synovitis upper or lower extremity  EXTREMITIES/VASCULAR:no cyanosis, clubbing or edema  LINES, TUBES, DRAINS:  none  SKIN: no rashes, no suspicious lesions  NEUROLOGIC: follows commands  PSYCHIATRIC: alert and oriented x 3; affect appropriate          ASSESSMENT/PLAN    AMS/toxic encephalopathy  - improving  - MRI with no acute findings  - seen by Neurology, Dr. Chavez     2. Fever/Tremors  - resolved  - RRT called 12/28 for shivering/tremor like activities  - Continuous EEG did not show any seizure     3. Fall/ Syncope, near  - CT spine and brain negative for acute process  - Troponin negative   - Fall precautions  - Cont therapies   - Monitor     4. Pulmonary consolidation   - CT chest: ground glass opacity and pulmonary consolidation most likely representing pulmonary infection  - No PE  - Completed rocephin and Zithromax   - supplemental O2 prn, wean as able   - seen by Pulmonology, Dr. Katz      5. Pulmonary nodule, suspicious per CT  -    CT chest: 2.1 x 1.8 cm pulmonary nodule concerning for primary pulmonary malignancy, f/u outpatient  - outpatient PET scan post pneumonia resolution  - in house pulmonology consult   - f/u Dr. Katz W in 2 weeks     6. HLD  - Cont rosuvastatin     7. Tachycardia/HTN  - started on coreg, cont  - in house cardiology consult   - monitor     8. Anxiety  - cont lorazepam prn  - monitor     9. Hx Covid-19  - per spouse around  [place]  - s/p Paxlovid  - supportive care     10. GOC  - Full code  - return home post [facility]  -  assisting w/ discharge planning           Lynette Mccollum, APRN    60 minutes spent w/ patient and staff, including but not limited to/ reviewing present status, needs, abilities with disciplines, reviewing medical records, vital signs, labs, completing medication reconciliation and entering orders for continued care in [facility].    01/08/24   11:23 AM

## 2024-01-09 ENCOUNTER — TELEPHONE (OUTPATIENT)
Dept: PULMONOLOGY | Facility: CLINIC | Age: 74
End: 2024-01-09

## 2024-01-09 NOTE — TELEPHONE ENCOUNTER
Consult 12/27/23 w/ Dr. Katz. Per progress notes from Lynette Mccollum, APRN dated 1/8/24 pulmonary nodule suspicious (CT) & f/u w/ Dr. Katz in 2 wks. Dr. Katz- when do you want to add pt to your schedule?

## 2024-01-09 NOTE — TELEPHONE ENCOUNTER
[facility] calling to schedule Hospital follow up, Lung nodules.  Ananth can be reached at ext  [phone].   Please call

## 2024-01-11 ENCOUNTER — SNF VISIT (OUTPATIENT)
Dept: INTERNAL MEDICINE CLINIC | Facility: SKILLED NURSING FACILITY | Age: 74
End: 2024-01-11

## 2024-01-11 DIAGNOSIS — R53.81 PHYSICAL DECONDITIONING: ICD-10-CM

## 2024-01-11 DIAGNOSIS — Z09 ENCOUNTER FOR FOLLOW-UP: ICD-10-CM

## 2024-01-11 DIAGNOSIS — J18.9 PNEUMONIA DUE TO INFECTIOUS ORGANISM, UNSPECIFIED LATERALITY, UNSPECIFIED PART OF LUNG: Primary | ICD-10-CM

## 2024-01-11 PROCEDURE — 1111F DSCHRG MED/CURRENT MED MERGE: CPT | Performed by: CLINICAL NURSE SPECIALIST

## 2024-01-11 PROCEDURE — 99309 SBSQ NF CARE MODERATE MDM 30: CPT | Performed by: CLINICAL NURSE SPECIALIST

## 2024-01-11 NOTE — PROGRESS NOTES
HPI: Ronn Blank : 1950 Age 73 year old male patient is admitted to [facility] for ANURAG    Reason for visit: f/u fall, pneumonia, recent Covid-19, weakness    [facility]: -  [facility]: -present    Dispo: Home w/ spouse post ANURAG    This is a 74 yo male w/ past medical hx significant for arthritis, HLD, OP; who presented to [facility] after a fall w/ confusion and reports of weakness. Per spouse pt and her had Covid-19 around gi and since pt has been weak. Imaging in ED negative for acute injury. CT chest significant for groundglass opacities and pulmonary nodule concerning for malignancy. IV abx were started and pt was seen by pulmonology, Dr. Katz. Plan for outpatoent PET scan post pneumonia resolution. During hospital stay RRT was callled due to seizure like activity and tachycardia. Pt was seen by neurology, Dr. Chavez and continuous EEG was started, Results were negative. MRI brain also negative. Oncemedically stable, pt was transferred to [facility] for ANURAG on .    Pt seen in , in no acute distress. Spouse present. Sts he is feeling well. Denies pain. Denies SOB or cough. Sts he is participating w/ therapies and able to ambulate w/ a walker. Tolerating diet. Seen by pulmonology, per note plan to repeat CXR.  will monitor.  Continue ANURAG.   Past Medical History:  Diagnosis Date   Arthritis   Erectile dysfunction   Hearing impairment  [place] - bilateral hearing aids   Hypercholesterolemia   Osteoporosis   PONV (postoperative nausea and vomiting)  Past Surgical History:  Procedure Laterality Date   COLONOSCOPY   DENTAL SURGERY PROCEDURE  extensive tental surgery - implants and bridges   FRACTURE SURGERY Right 2019  Right femur fracture percutaneous reduction with interlocking intramedullary long femoral nailing   HERNIA SURGERY   hernia repair after spinal surgery   SPINE SURGERY PROCEDURE UNLISTED   traumatic injury to L3 spinal surgery  with metal  Family History  Problem Relation Age of Onset   Cancer Mother  breast   Cancer Sister  colon   Heart Disorder Brother   Other (Other) Other  osteoarthritis, osteoporosis  Social History  Socioeconomic History   Marital status:   Tobacco Use   Smoking status: Former  Packs/day: 1.00  Years: 20.00  Additional pack years: 0.00  Total pack years: 20.00  Types: Cigarettes  Quit date: 1996  Years since quittin.0   Smokeless tobacco: Never  Substance and Sexual Activity   Alcohol use: No  Alcohol/week: 0.0 standard drinks of alcohol   Drug use: No  Other Topics Concern   Caffeine Concern Yes  Comment: 2 cups coffee   Exercise Yes  Comment: aerobic, walking 3-4 times a week   Seat Belt No   Special Diet No   Stress Concern No   Weight Concern No  Social Determinants of Health  Food Insecurity: No Food Insecurity (2023)  Food Insecurity   Food Insecurity: Never true  Transportation Needs: No Transportation Needs (2023)  Transportation Needs   Lack of Transportation: No  Housing Stability: Low Risk (2023)  Housing Stability   Housing Instability: No  ALLERGIES:  Allergies  Allergen Reactions   Sulfa Antibiotics RASH  CODE STATUS: Full Code  CURRENT MEDICATIONS: Reviewed on SNF EMR  SUBJECTIVE/ROS:  GENERAL HEALTH:feels well otherwise  HEENT:no visual complaints or deficits denies sinus pain or sore throat;  RESPIRATORY: denies shortness of breath, wheezing or cough  CARDIOVASCULAR:denies chest pain, no palpitations  GI: denies nausea, vomiting, constipation, diarrhea; no rectal bleeding; no heartburn  MUSCULOSKELETAL:nojoint complaints upper or lower extremities  NEURO:denies seizures  PSYCHE: no symptoms of depression or anxiety  HEMATOLOGY:denies excessive bleeding  ENDOCRINE: denies excessive thirst or urination; denies unexpected wt gain or wt loss  SKIN: denies any unusual skin lesions or rashes  OBJECTIVE:  VITALS: Reviewed  LABS/Imaging: Drawn today: results  pending  PHYSICAL EXAM:  GENERAL HEALTH: well developed, well nourished, in no apparent distress  HEENT: atraumatic/normocephalic, mucous membranes pink and moist, EOMI, sclera anicteric, conjunctiva normal.  RESPIRATORY: clear  CARDIOVASCULAR: regular 2  ABDOMEN: normal active BS+, soft, non distended, nontender  LYMPHATIC:no lymphedema  MUSCULOSKELETAL: no acute synovitis upper or lower extremity  EXTREMITIES/VASCULAR:no cyanosis, clubbing or edema  LINES, TUBES, DRAINS: none  SKIN: no rashes, no suspicious lesions  NEUROLOGIC: follows commands  PSYCHIATRIC: alert and oriented x 3; affect appropriate    ASSESSMENT/PLAN  Continue therapies  ? Repeat CXR per pulmonology    1. AMS/toxic encephalopathy  - improving  - MRI with no acute findings  - seen by Neurology, Dr. Chavez     2. Fever/Tremors  - resolved  - RRT called 12/28 for shivering/tremor like activities  - Continuous EEG did not show any seizure     3. Fall/ Syncope, near  - CT spine and brain negative for acute process  - Troponin negative  - Fall precautions  - Cont therapies  - Monitor     4. Pulmonary consolidation  - CT chest: ground glass opacity and pulmonary consolidation most likely representing pulmonary infection  - No PE  - Completed rocephin and Zithromax   - supplemental O2 prn, wean as able  - seen by Pulmonology, Dr. Katz     5. Pulmonary nodule, suspicious per CT  - CT chest: 2.1 x 1.8 cm pulmonary nodule concerning for primary pulmonary malignancy, f/u outpatient  - outpatient PET scan post pneumonia resolution  - in house pulmonology consult  - f/u WILLIAM Grant in 2 weeks     6. HLD  - Cont rosuvastatin  7. Tachycardia/HTN  - started on coreg, cont  - in house cardiology consult  - monitor  8. Anxiety  - cont lorazepam prn  - monitor  9. Hx Covid-19  - per spouse around Thanksgiving  - s/p Paxlovid  - supportive care  10. GOC  - Full code  - return home post ANURAG  -  assisting w/ discharge planning  Lynette Mccollum, APRN  30  minutes spent w/ patient and staff, including but not limited to/ reviewing present status, needs, abilities with disciplines, reviewing medical records, vital signs, labs, completing medication reconciliation and entering orders for continued care in [facility].

## 2024-01-16 NOTE — TELEPHONE ENCOUNTER
Ananth @ [facility] accepted appt w/ Dr. Katz on 1/23 10:15 am [facility]. Appt info given. She voiced understanding.

## 2024-01-18 ENCOUNTER — SNF VISIT (OUTPATIENT)
Dept: INTERNAL MEDICINE CLINIC | Facility: SKILLED NURSING FACILITY | Age: 74
End: 2024-01-18

## 2024-01-18 DIAGNOSIS — R53.81 PHYSICAL DECONDITIONING: ICD-10-CM

## 2024-01-18 DIAGNOSIS — J18.9 PNEUMONIA OF RIGHT LUNG DUE TO INFECTIOUS ORGANISM, UNSPECIFIED PART OF LUNG: Primary | ICD-10-CM

## 2024-01-18 DIAGNOSIS — Z09 ENCOUNTER FOR FOLLOW-UP: ICD-10-CM

## 2024-01-18 DIAGNOSIS — U09.9 COVID-19 LONG HAULER: ICD-10-CM

## 2024-01-18 PROCEDURE — 99309 SBSQ NF CARE MODERATE MDM 30: CPT | Performed by: CLINICAL NURSE SPECIALIST

## 2024-01-18 PROCEDURE — 1111F DSCHRG MED/CURRENT MED MERGE: CPT | Performed by: CLINICAL NURSE SPECIALIST

## 2024-01-18 NOTE — PROGRESS NOTES
HPI: Ronn Blank : 1950 Age 73 year old male patient is admitted to [facility] for ANURAG    Reason for visit: f/u fall, pneumonia, recent Covid-19, weakness    [facility]: -  [facility]: -present  Dispo: Home w/ spouse post ANURAG    This is a 74 yo male w/ past medical hx significant for arthritis, HLD, OP; who presented to [facility] after a fall w/ confusion and reports of weakness. Per spouse pt and her had Covid-19 around giving and since pt has been weak. Imaging in ED negative for acute injury. CT chest significant for groundglass opacities and pulmonary nodule concerning for malignancy. IV abx were started and pt was seen by pulmonology, Dr. Katz. Plan for outpatient PET scan post pneumonia resolution. During hospital stay RRT was called due to seizure like activity and tachycardia. Pt was seen by neurology, Dr. Chavez and continuous EEG was started, Results were negative. MRI brain also negative. Once medically stable, pt was transferred to [facility] for ANURAG on .  Pt seen in , in no acute distress. Sts he is feeling well. Denies pain. Denies SOB or cough. Sts he is participating w/ therapies and able to ambulate w/ a cane. Tolerating diet. Repeat CXR done today, results pending.  discharge planning in place, likely home next week.    Past Medical History:  Diagnosis Date   Arthritis   Erectile dysfunction   Hearing impairment  [place] - bilateral hearing aids   Hypercholesterolemia   Osteoporosis   PONV (postoperative nausea and vomiting)  Past Surgical History:  Procedure Laterality Date   COLONOSCOPY   DENTAL SURGERY PROCEDURE  extensive tental surgery - implants and bridges   FRACTURE SURGERY Right 2019  Right femur fracture percutaneous reduction with interlocking intramedullary long femoral nailing   HERNIA SURGERY   hernia repair after spinal surgery   SPINE SURGERY PROCEDURE UNLISTED   traumatic injury to L3 spinal surgery with  metal  Family History  Problem Relation Age of Onset   Cancer Mother  breast   Cancer Sister  colon   Heart Disorder Brother   Other (Other) Other  osteoarthritis, osteoporosis  Social History  Socioeconomic History   Marital status:   Tobacco Use   Smoking status: Former  Packs/day: 1.00  Years: 20.00  Additional pack years: 0.00  Total pack years: 20.00  Types: Cigarettes  Quit date: 1996  Years since quittin.0   Smokeless tobacco: Never  Substance and Sexual Activity   Alcohol use: No  Alcohol/week: 0.0 standard drinks of alcohol   Drug use: No  Other Topics Concern   Caffeine Concern Yes  Comment: 2 cups coffee   Exercise Yes  Comment: aerobic, walking 3-4 times a week   Seat Belt No   Special Diet No   Stress Concern No   Weight Concern No  Social Determinants of Health  Food Insecurity: No Food Insecurity (2023)  Food Insecurity   Food Insecurity: Never true  Transportation Needs: No Transportation Needs (2023)  Transportation Needs   Lack of Transportation: No  Housing Stability: Low Risk (2023)  Housing Stability   Housing Instability: No  ALLERGIES:  Allergies  Allergen Reactions   Sulfa Antibiotics RASH  CODE STATUS: FullCode  CURRENT MEDICATIONS: Reviewed on SNF EMR  SUBJECTIVE/ROS:  GENERAL HEALTH:feels well otherwise  HEENT:no visual complaints or deficits denies sinus pain or sore throat;  RESPIRATORY: denies shortness of breath, wheezing or cough  CARDIOVASCULAR:denies chest pain, no palpitations  GI: denies nausea, vomiting, constipation, diarrhea; no rectal bleeding; no heartburn  MUSCULOSKELETAL:no joint complaints upper or lower extremities  NEURO:denies seizures  PSYCHE: no symptoms of depression or anxiety  HEMATOLOGY:denies excessive bleeding  ENDOCRINE: denies excessive thirst or urination; denies unexpected wt gain or wt loss  SKIN: denies any unusual skin lesions or rashes  OBJECTIVE:  VITALS: Reviewed  LABS/Imaging: CBC, CMP   PHYSICAL EXAM:  GENERAL  HEALTH: well developed, well nourished, in no apparent distress  HEENT: atraumatic/normocephalic, mucous membranes pink and moist, EOMI, sclera anicteric, conjunctiva normal.  RESPIRATORY: clear  CARDIOVASCULAR: regular 2  ABDOMEN: normal active BS+, soft, non distended, nontender  LYMPHATIC:no lymphedema  MUSCULOSKELETAL: no acute synovitis upper or lower extremity  EXTREMITIES/VASCULAR:no cyanosis, clubbing or edema  LINES, TUBES, DRAINS: none  SKIN: no rashes, no suspicious lesions  NEUROLOGIC: follows commands  PSYCHIATRIC: alert and oriented x 3; affect appropriate  ASSESSMENT/PLAN  Continue therapies  Repeat CXR per pulmonology: R patchy opacities compatible w/ pneumonia. Comparison CXR 12/28 w/ patchy opacities R>L. Defer to pulmonology if treatment needed, pt asymptomatic on RA. Had repeat CXR today, results pending.     1. AMS/toxic encephalopathy  - improving  - MRI with no acute findings  - seen by Neurology, Dr. Chavez  2. Fever/Tremors  - resolved  - RRT called 12/28 for shivering/tremor like activities  - Continuous EEG did not show any seizure  3. Fall/ Syncope, near  - CT spine and brain negative for acute process  - Troponin negative  - Fall precautions  - Cont therapies  - Monitor  4. Pulmonary consolidation  - CT chest: ground glass opacity and pulmonary consolidation most likely representing pulmonary infection  - No PE  - Completed rocephin and Zithromax  - supplemental O2 prn, wean as able  - seen by Pulmonology, Dr. Katz  5. Pulmonary nodule, suspicious per CT  - CT chest: 2.1 x 1.8 cm pulmonary nodule concerning for primary pulmonary malignancy, f/u outpatient  - outpatient PET scan post pneumonia resolution  - in house pulmonology consult  - f/u Dr. Katz W in 2 weeks  6. HLD  - Cont rosuvastatin  7. Tachycardia/HTN  - started on coreg, cont  - in house cardiology consult  - monitor  8. Anxiety  - cont lorazepam prn  - monitor  9. Hx Covid-19  - per spouse around Thanksgiving  - s/p  Paxlovid  - supportive care  10. GOC  - Full code  - return home post [facility]  -  assisting w/ discharge planning  Lynette Mccollum, APRN  30 minutes spent w/ patient and staff, including but not limited to/ reviewing present status, needs, abilities with disciplines, reviewing medical records, vital signs, labs, completing medication reconciliation and entering orders for continued care in [facility].

## 2024-02-21 PROBLEM — M62.81 MUSCLE WEAKNESS (GENERALIZED): Status: ACTIVE | Noted: 2024-01-04

## 2024-02-21 PROBLEM — N52.9 MALE ERECTILE DYSFUNCTION, UNSPECIFIED: Status: ACTIVE | Noted: 2024-01-04

## 2024-02-21 PROBLEM — D72.829 ELEVATED WHITE BLOOD CELL COUNT, UNSPECIFIED: Status: ACTIVE | Noted: 2024-01-04

## 2024-02-21 PROBLEM — R55 SYNCOPE AND COLLAPSE: Status: ACTIVE | Noted: 2024-01-04

## 2024-02-21 PROBLEM — H91.90 UNSPECIFIED HEARING LOSS, UNSPECIFIED EAR: Status: ACTIVE | Noted: 2024-01-04

## 2024-06-20 PROBLEM — E11.9 TYPE 2 DIABETES MELLITUS WITHOUT COMPLICATION, WITHOUT LONG-TERM CURRENT USE OF INSULIN (HCC): Status: ACTIVE | Noted: 2024-06-20

## 2024-06-20 PROBLEM — I70.213 ATHEROSCLEROSIS OF NATIVE ARTERY OF BOTH LOWER EXTREMITIES WITH INTERMITTENT CLAUDICATION: Status: ACTIVE | Noted: 2024-06-20

## 2024-06-20 PROBLEM — I70.213 ATHEROSCLEROSIS OF NATIVE ARTERY OF BOTH LOWER EXTREMITIES WITH INTERMITTENT CLAUDICATION (HCC): Status: ACTIVE | Noted: 2024-06-20

## 2024-12-03 PROCEDURE — 82306 VITAMIN D 25 HYDROXY: CPT

## 2024-12-03 PROCEDURE — 80061 LIPID PANEL: CPT

## 2024-12-03 PROCEDURE — 85025 COMPLETE CBC W/AUTO DIFF WBC: CPT

## 2024-12-03 PROCEDURE — 83036 HEMOGLOBIN GLYCOSYLATED A1C: CPT

## 2024-12-03 PROCEDURE — 84443 ASSAY THYROID STIM HORMONE: CPT

## 2024-12-03 PROCEDURE — 80053 COMPREHEN METABOLIC PANEL: CPT

## 2025-05-21 ENCOUNTER — HOSPITAL ENCOUNTER (EMERGENCY)
Facility: HOSPITAL | Age: 75
Discharge: HOME | End: 2025-05-21
Attending: EMERGENCY MEDICINE
Payer: MEDICARE

## 2025-05-21 ENCOUNTER — APPOINTMENT (OUTPATIENT)
Dept: ULTRASOUND IMAGING | Facility: HOSPITAL | Age: 75
End: 2025-05-21
Attending: EMERGENCY MEDICINE
Payer: MEDICARE

## 2025-05-21 VITALS
DIASTOLIC BLOOD PRESSURE: 86 MMHG | RESPIRATION RATE: 20 BRPM | HEIGHT: 68 IN | SYSTOLIC BLOOD PRESSURE: 132 MMHG | WEIGHT: 174 LBS | BODY MASS INDEX: 26.37 KG/M2 | TEMPERATURE: 98 F | OXYGEN SATURATION: 96 % | HEART RATE: 85 BPM

## 2025-05-21 DIAGNOSIS — N50.89 SCROTAL ERYTHEMA: Primary | ICD-10-CM

## 2025-05-21 PROCEDURE — 76870 US EXAM SCROTUM: CPT | Performed by: EMERGENCY MEDICINE

## 2025-05-21 PROCEDURE — 99284 EMERGENCY DEPT VISIT MOD MDM: CPT

## 2025-05-21 PROCEDURE — 93975 VASCULAR STUDY: CPT | Performed by: EMERGENCY MEDICINE

## 2025-05-21 RX ORDER — CLINDAMYCIN HYDROCHLORIDE 300 MG/1
300 CAPSULE ORAL 3 TIMES DAILY
Qty: 21 CAPSULE | Refills: 0 | Status: SHIPPED | OUTPATIENT
Start: 2025-05-21 | End: 2025-05-28

## 2025-05-21 NOTE — ED INITIAL ASSESSMENT (HPI)
Patient arrives with reports of nodule on L testicle. Noticed it 2 nights ago. Denies dysuria. Denies fevers. Denies abd pain.

## 2025-05-21 NOTE — ED QUICK NOTES
Patient safe to discharge home per ED Provider. Discharge education provided, including follow up instructions. Patient verbalizes understanding. Family at bedside.

## 2025-05-21 NOTE — DISCHARGE INSTRUCTIONS
Take the antibiotic prescribed to you today as directed.  Make sure to finish the entire course even if you start to feel better.    Use a warm compress at the area of redness 2-3 times a day.    See urology early next week for follow-up appointment.    Return to the ER if you develop worsening symptoms, fever, vomiting, or any emergent concerns.

## 2025-05-22 NOTE — ED PROVIDER NOTES
Patient Seen in: [facility] Emergency Department       The following individual(s) verbally consented to be recorded using ambient AI listening technology and understand that they can each withdraw their consent to this listening technology at any point by asking the clinician to turn off or pause the recording:    Patient name: Ronn Blank        History  Chief Complaint   Patient presents with    Eval-G     Stated Complaint: Eval-G    Subjective:   HPI     Ronn Blank is a 74 year old male who presents with a new lump and discomfort in the left inguinal region.    Approximately two days ago, he noticed a swelling and irritation in the left inguinal region, located between his left leg and penis. The lump appeared quickly, similar to a bruise from twisting around. Initially, it was very painful, especially when he woke up at 2 AM, but now it is more of an annoyance, though still noticeable. He has a high tolerance for pain and describes the current sensation as discomfort rather than pain.    He reports frequent urination, needing to get up two to three times a night. He has had trouble urinating since his late fifties, which he managed by sitting down to urinate, making it easier for him. No pain during urination beyond his usual experience, and describes his urination as 'start, stop'.    No vomiting, diarrhea, constipation, fever, itching, or burning.      Objective:     Past Medical History:    Arthritis    Erectile dysfunction    Hearing impairment    [place] - bilateral hearing aids    Hypercholesterolemia    Osteoporosis    PONV (postoperative nausea and vomiting)              Past Surgical History:   Procedure Laterality Date    Colonoscopy      Dental surgery procedure      extensive tental surgery - implants and bridges     Fracture surgery Right 04/17/2019    Right femur fracture percutaneous reduction with interlocking intramedullary long femoral nailing    Hernia surgery  1997     hernia repair after spinal surgery     Spine surgery procedure unlisted      traumatic injury to L3 spinal surgery with metal                Social History     Socioeconomic History    Marital status:    Tobacco Use    Smoking status: Former     Current packs/day: 0.00     Average packs/day: 1 pack/day for 20.0 years (20.0 ttl pk-yrs)     Types: Cigarettes     Start date: 1976     Quit date: 1996     Years since quittin.4    Smokeless tobacco: Never   Vaping Use    Vaping status: Never Used   Substance and Sexual Activity    Alcohol use: No     Alcohol/week: 0.0 standard drinks of alcohol    Drug use: No   Other Topics Concern    Caffeine Concern Yes     Comment: 2 cups coffee    Exercise Yes     Comment: aerobic, walking 3-4 times a week    Seat Belt No    Special Diet No    Stress Concern No    Weight Concern No     Social Drivers of Health     Food Insecurity: No Food Insecurity (2023)    Food Insecurity     Food Insecurity: Never true   Transportation Needs: No Transportation Needs (2023)    Transportation Needs     Lack of Transportation: No   Housing Stability: Low Risk  (2023)    Housing Stability     Housing Instability: No                                Physical Exam    ED Triage Vitals [25 1334]   /90   Pulse 99   Resp 22   Temp 98.4 °F (36.9 °C)   Temp src Temporal   SpO2 96 %   O2 Device None (Room air)       Current Vitals:   Vital Signs  BP: 132/86  Pulse: 85  Resp: 20  Temp: 98.4 °F (36.9 °C)  Temp src: Temporal    Oxygen Therapy  SpO2: 96 %  O2 Device: None (Room air)            Physical Exam  Vitals and nursing note reviewed.   Constitutional:       General: He is not in acute distress.     Appearance: Normal appearance. He is not toxic-appearing.   HENT:      Head: Normocephalic and atraumatic.   Eyes:      Conjunctiva/sclera: Conjunctivae normal.   Cardiovascular:      Rate and Rhythm: Normal rate and regular rhythm.   Pulmonary:      Effort:  Pulmonary effort is normal. No respiratory distress.   Abdominal:      Palpations: Abdomen is soft.      Tenderness: There is no abdominal tenderness.   Genitourinary:     Comments: No testicular tenderness or swelling bilaterally, at the left inguinal fold just proximal to the left testicle on the left scrotum there is a 1 x 2 cm area of induration and erythema, tenderness palpation, no obvious drainage  Musculoskeletal:         General: Normal range of motion.      Cervical back: Normal range of motion and neck supple. No rigidity.   Neurological:      General: No focal deficit present.      Mental Status: He is alert.   Psychiatric:         Mood and Affect: Mood normal.                 ED Course  Labs Reviewed - No data to display       US SCROTUM W/ DOPPLER (CPT=93975/71540)  Result Date: 5/21/2025  CONCLUSION:  1.  No intratesticular mass lesion, epididymo-orchitis, or torsion. 2.  Small hydrocele in the left hemiscrotum. 3.  Within the superior lateral aspect of the left hemiscrotum an area of discomfort as indicated by the patient there is a 12 x 10 x 12 mm lobulated hypoechoic focus likely representing a small band of fluid or a short segment of bowel/hernia.    Dictated by (CST): Vishnu Gilmore MD on 5/21/2025 at 4:43 PM     Finalized by (CST): Vishnu Gilmore MD on 5/21/2025 at 4:46 PM                            MDM             Medical Decision Making  Differential diagnosis includes but is not limited to malignancy, abscess, epididymitis, folliculitis, cellulitis    Well-appearing patient, imaging as above, suspect folliculitis, may have small developing abscess.  Advised warm compress, will cover with clindamycin.  Encouraged outpatient follow-up as well as return precautions.  Patient and wife at the bedside verbalized understanding of and agreement with this plan.    Problems Addressed:  Scrotal erythema: acute illness or injury     Details: Differential diagnoses encompassed high-risk conditions with  potential threats to life, limb, or major bodily functions, warranting comprehensive evaluation and high-complexity medical decision-making.      Amount and/or Complexity of Data Reviewed  Independent Historian: spouse     Details: Wife providing additional history  Radiology: ordered.    Risk  Prescription drug management.        Disposition and Plan     Clinical Impression:  1. Scrotal erythema         Disposition:  Discharge  5/21/2025  5:15 pm    Follow-up:  Yang Rodríguez MD  [address] [address]  [phone]    Schedule an appointment as soon as possible for a visit on 5/27/2025  For follow up    We recommend that you schedule follow up care with a primary care provider within the next three months to obtain basic health screening including reassessment of your blood pressure.      Medications Prescribed:  Discharge Medication List as of 5/21/2025  5:15 PM        START taking these medications    Details   clindamycin 300 MG Oral Cap Take 1 capsule (300 mg total) by mouth 3 (three) times daily for 7 days., Normal, Disp-21 capsule, R-0                   Supplementary Documentation:

## (undated) DEVICE — GAMMEX® NON-LATEX PI ORTHO SIZE 9, STERILE POLYISOPRENE POWDER-FREE SURGICAL GLOVE: Brand: GAMMEX

## (undated) DEVICE — GUIDEWIRE SYNT 3.2X400 357.399

## (undated) DEVICE — GAUZE SPONGES,12 PLY: Brand: CURITY

## (undated) DEVICE — 3M™ STERI-STRIP™ REINFORCED ADHESIVE SKIN CLOSURES, R1547, 1/2 IN X 4 IN (12 MM X 100 MM), 6 STRIPS/ENVELOPE: Brand: 3M™ STERI-STRIP™

## (undated) DEVICE — SUTURE PDS II 1 CT-1

## (undated) DEVICE — SUCTION CANISTER, 3000CC,SAFELINER: Brand: DEROYAL

## (undated) DEVICE — 450 ML BOTTLE OF 0.05% CHLORHEXIDINE GLUCONATE IN 99.95% STERILE WATER FOR IRRIGATION, USP AND APPLICATOR.: Brand: IRRISEPT ANTIMICROBIAL WOUND LAVAGE

## (undated) DEVICE — 3M™ TEGADERM™ TRANSPARENT FILM DRESSING, 1626W, 4 IN X 4-3/4 IN (10 CM X 12 CM), 50 EACH/CARTON, 4 CARTON/CASE: Brand: 3M™ TEGADERM™

## (undated) DEVICE — KIT DRN 3/16IN PVC DRN 3 SPRG

## (undated) DEVICE — Device

## (undated) DEVICE — SOL  .9 1000ML BTL

## (undated) DEVICE — PEN: MARKING STD PT 100/CS: Brand: MEDICAL ACTION INDUSTRIES

## (undated) DEVICE — BATTERY

## (undated) DEVICE — BANDAGE ROLL,100% COTTON, 6 PLY, LARGE: Brand: KERLIX

## (undated) DEVICE — DRESSING BIOPATCH 1X4 CNTR

## (undated) DEVICE — TRAY SKIN PREP PVP-1

## (undated) DEVICE — GAMMEX® PI HYBRID SIZE 9, STERILE POWDER-FREE SURGICAL GLOVE, POLYISOPRENE AND NEOPRENE BLEND: Brand: GAMMEX

## (undated) DEVICE — OCCLUSIVE GAUZE STRIP OVERWRAP,3% BISMUTH TRIBROMOPHENATE IN PETROLATUM BLEND: Brand: XEROFORM

## (undated) DEVICE — HIP PINNING: Brand: MEDLINE INDUSTRIES, INC.

## (undated) DEVICE — 3M™ IOBAN™ 2 ANTIMICROBIAL INCISE DRAPE 6650EZ: Brand: IOBAN™ 2

## (undated) DEVICE — PROXIMATE SKIN STAPLERS (35 WIDE) CONTAINS 35 STAINLESS STEEL STAPLES (FIXED HEAD): Brand: PROXIMATE

## (undated) DEVICE — 3M™ STERI-DRAPE™ INSTRUMENT POUCH 1018: Brand: STERI-DRAPE™

## (undated) DEVICE — UNDYED BRAIDED (POLYGLACTIN 910), SYNTHETIC ABSORBABLE SUTURE: Brand: COATED VICRYL

## (undated) DEVICE — INTENDED FOR TISSUE SEPARATION, AND OTHER PROCEDURES THAT REQUIRE A SHARP SURGICAL BLADE TO PUNCTURE OR CUT.: Brand: BARD-PARKER ® STAINLESS STEEL BLADES

## (undated) DEVICE — SPONGE LAP 18X18 XRAY STRL

## (undated) DEVICE — 6617 IOBAN II PATIENT ISOLATION DRAPE 5/BX,4BX/CS: Brand: STERI-DRAPE™ IOBAN™ 2

## (undated) DEVICE — SUTURE VICRYL 0 CP-1

## (undated) DEVICE — DRILL BIT 4.2/3-FLTD CALIB

## (undated) DEVICE — SUTURE VICRYL 2-0 FS-1

## (undated) DEVICE — ABDOMINAL PAD: Brand: CURITY

## (undated) DEVICE — STERILE TETRA-FLEX CF, ELASTIC BANDAGE LATEX FREE 6IN X5.5 YD: Brand: TETRA-FLEX™CF

## (undated) DEVICE — 3M™ MEDITPORE™ SOFT CLOTH TAPE 6 IN X 10 YD 12 ROLLS/CASE 2966: Brand: 3M™ MEDIPORE™

## (undated) DEVICE — ROD RM 950MM 2.5MM BALL TIP GW

## (undated) DEVICE — SUTURE MONOCRYL 3-0 Y936H

## (undated) DEVICE — TOWEL OR BLU 16X26 STRL

## (undated) NOTE — LETTER
[address]  [facility]  Dept: [phone]  Dept Fax: [phone]  Loc: [phone]      March 3, 2017    Patient: Franck Horne   Date of Visit: 3/3/2017       To Whom It May Concern:    Mirna Moulton

## (undated) NOTE — IP AVS SNAPSHOT
[facility]            (For Outpatient Use Only) Initial Admit Date: 4/16/2019   Inpt/Obs Admit Date: Inpt: 4/17/19 / Obs: N/A   Discharge Date:    Christ Amaya:  [de-identified]   MRN: [de-identified]   CSN: [id number]   CEID: [id number]        CZ Subscriber ID:  Pt Rel to Subscriber:    Hospital Account Financial Class:  Worker's Comp    April 24, 2019

## (undated) NOTE — IP AVS SNAPSHOT
Patient Demographics     Address  [address] [place] [address] Phone  [phone] (Home) *Preferred*  [phone] (Mobile) E-mail Address  [email or web address]      Patient Contacts     Name Relation Home Work Mobile    Yohana Blank Spouse [phone]    JORGE ALBERTO PUENTE   [phone]      Allergies as of 1/4/2024  Review status set to In Progress on 12/27/2023       Noted Reaction Type Reactions    DELETED: Sulfanilamide 10/12/2015        Sulfa Antibiotics 03/26/2019    RASH      Code Status Information     Code Status    DNAR/Full Treatment      Patient Instructions    None      Follow-up Information     Chester Katz, DO. Schedule an appointment as soon as possible for a visit in 2 week(s).    Specialty: PULMONARY DISEASES  Why: lung nodule f/u  Contact information:  [address] [facility] [address]  [facility] [id number] [phone]             Oziel Varela MD Follow up in 1 week(s).    Specialty: Internal Medicine  Why: Follow up in 1-2 weeks  Contact information:  [address]  [place] [address]  [phone]                        Your Home Meds List      TAKE these medications       Instructions Authorizing Provider Morning Afternoon Evening As Needed   acetaminophen 500mg      Take 500 mg by mouth every 6 (six) hours as needed.          carvedilol 6.25 MG Tabs  Commonly known as: Coreg  Next dose due: This evening at 6pm      Take 1 tablet (6.25 mg total) by mouth 2 (two) times daily with meals.   Robert Velasquez         cholecalciferol 125 MCG (5000 UT) Caps  Commonly known as: Vitamin D3  Next dose due: Tomorrow morning at 8am      Take 1 capsule (5,000 Units total) by mouth daily.          cyanocobalamin 1000 MCG Tabs  Commonly known as: Vitamin B12  Next dose due: Tomorrow morning at 8am      Take 1 tablet (1,000 mcg total) by mouth daily.          fluticasone propionate 50 MCG/ACT Susp  Commonly known as: Flonase  Next dose due: Tomorrow morning at 8am      2  sprays by Nasal route daily.   Charlene Spencer         LORazepam 1 MG Tabs  Commonly known as: Ativan      Take 1 tablet (1 mg total) by mouth every 6 (six) hours as needed for Anxiety.   Robert Velasquez         niacin 500 MG Tabs  Next dose due: Tomorrow morning at 8am      Take 1 tablet (500 mg total) by mouth daily with breakfast.          omega-3-acid ethyl esters 1 g Caps  Commonly known as: Lovaza  Next dose due: Tomorrow morning at 8am      Take 1 capsule (1 g total) by mouth daily.          QC Mens Daily Multivitamin Tabs  Next dose due: Tomorrow morning at 8am      Take by mouth.          rosuvastatin 10 MG Tabs  Commonly known as: Crestor  Next dose due: This evening at 8pm      Take 1 tablet (10 mg total) by mouth nightly.   Robert Velasquez         Vitamin C 500 MG Tabs  Commonly known as: VITAMIN C  Next dose due: Tomorrow morning at 8am      Take 1 tablet (500 mg total) by mouth daily.                Where to Get Your Medications      Please  your prescriptions at the location directed by your doctor or nurse    Bring a paper prescription for each of these medications  LORazepam 1 MG Tabs           [id number] - MAR ACTION REPORT  (last 48 hrs)    ** SITE UNKNOWN **     Order ID Medication Name Action Time Action Reason Comments    [id number] carvedilol (Coreg) tab 6.25 mg 01/03/24 1804 Given      [id number] carvedilol (Coreg) tab 6.25 mg 01/04/24 0807 Given      [id number] rosuvastatin (Crestor) tab 10 mg 01/02/24 2024 Given      [id number] rosuvastatin (Crestor) tab 10 mg 01/03/24 2103 Given      [id number] vancomycin (Vancocin) cap 125 mg 01/03/24 0855 Given      [id number] vancomycin (Vancocin) cap 125 mg 01/04/24 0807 Given            LEFT LOWER ABDOMEN     Order ID Medication Name Action Time Action Reason Comments    [id number] enoxaparin (Lovenox) 40 MG/0.4ML SUBQ injection 40 mg 01/03/24 0855 Given      [id number] enoxaparin (Lovenox) 40 MG/0.4ML SUBQ injection 40 mg 01/04/24 0807 Given       [id number] insulin aspart (NovoLOG) 100 Units/mL FlexPen 1-5 Units 01/04/24 0758 Given            RIGHT UPPER ARM     Order ID Medication Name Action Time Action Reason Comments    [id number] insulin aspart (NovoLOG) 100 Units/mL FlexPen 1-5 Units 01/02/24 1338 Given              Recent Vital Signs    Flowsheet Row Most Recent Value   /76 Filed at 01/04/2024 1222   Pulse 95 Filed at 01/04/2024 1222   Resp 20 Filed at 01/04/2024 1222   Temp 97.7 °F (36.5 °C) Filed at 01/04/2024 1222   SpO2 94 % Filed at 01/04/2024 0756      Patient's Most Recent Weight    Flowsheet Row Most Recent Value   Patient Weight 81 kg (178 lb 9.6 oz)      CPAP Settings (Inpatient)    Flowsheet Row Most Recent Value   Mode AutoPAP   Interface Full face mask   Mask size Medium   Flow rate 7 lpm      Lab Results Last 24 Hours    No matching results found     Microbiology Results (All)     Procedure Component Value Units Date/Time    Blood Culture [[id number]] Collected: 12/28/23 1500    Order Status: Completed Lab Status: Final result Updated: 01/02/24 1601    Specimen: Blood,peripheral      Blood Culture Result No Growth 5 Days    Blood Culture [[id number]] Collected: 12/28/23 1509    Order Status: Completed Lab Status: Final result Updated: 01/02/24 1601    Specimen: Blood,peripheral      Blood Culture Result No Growth 5 Days    SARS-CoV-2/Flu A and B/RSV by PCR (GeneXpert) [[id number]]  (Normal) Collected: 12/27/23 0616    Order Status: Completed Lab Status: Final result Updated: 12/27/23 0751    Specimen: Other from Nares      SARS-CoV-2 (COVID-19) - (GeneXpert) Not Detected     Influenza A by PCR Negative     Influenza B by PCR Negative     RSV by PCR Negative    Narrative:      This test is intended for the qualitative detection and differentiation of SARS-CoV-2, influenza A, influenza B, and respiratory syncytial virus (RSV) viral RNA in nasopharyngeal or nares swabs from individuals suspected of respiratory viral infection consistent  with COVID-19 by their healthcare provider. Signs and symptoms of respiratory viral infection due to SARS-CoV-2, influenza, and RSV can be similar.    Test performed using the Xpert Xpress SARS-CoV-2/FLU/RSV (real time RT-PCR)  assay on the GeneXpert instrument, [employer], [place] [address].   This test is being used under the Food and Drug Administration's Emergency Use Authorization.    The authorized Fact Sheet for Healthcare Providers for this assay is available upon request from the laboratory.         H&P - H&P Note      H&P signed by Nat Varela MD at 2023  7:54 PM  Version 1 of 1    Author: Nat Varela MD Service: Internal Medicine Author Type: Physician    Filed: 2023  7:54 PM Date of Service: 2023 11:28 AM Status: Signed    : Nat Varela MD (Physician)       [facility]    History and Physical    Ronn Blank Patient Status:  Observation    1950 MRN [id number]   Location [facility] 1W Attending Nat Varela MD   Hosp Day # 0 PCP NAT VARELA MD     Date:  2023  Date of Admission:  2023      HPI:     Chief Complaint   Patient presents with    Fall     HPI  Pt is a 74 y/o male with PMH of Hypercholesterolemia and osteoporosis presented to the ED from home s/p fall. Per wife patient had COVID right before thanksgi and since then has been lethargic. Last night he fell denies loss of consciousness but per wife she found him with his head wedged between the wall and furniture. CT spine and brain negative for acute process and CXR and UA negative. Pt was admitted for further eval and treatment.     Pt seen today resting in bed. NAD noted.  History     Past Medical History:   Diagnosis Date    Arthritis     Erectile dysfunction     Hearing impairment     [place] - bilateral hearing aids    Hypercholesterolemia     Osteoporosis     PONV (postoperative nausea and vomiting)      Past Surgical History:   Procedure Laterality Date     COLONOSCOPY      DENTAL SURGERY PROCEDURE      extensive tental surgery - implants and bridges     FRACTURE SURGERY Right 2019    Right femur fracture percutaneous reduction with interlocking intramedullary long femoral nailing    HERNIA SURGERY      hernia repair after spinal surgery     SPINE SURGERY PROCEDURE UNLISTED      traumatic injury to L3 spinal surgery with metal     Family History   Problem Relation Age of Onset    Cancer Mother         breast    Cancer Sister         colon    Heart Disorder Brother     Other (Other) Other         osteoarthritis, osteoporosis     Social History:  Social History     Socioeconomic History    Marital status:    Tobacco Use    Smoking status: Former     Packs/day: 1.00     Years: 20.00     Additional pack years: 0.00     Total pack years: 20.00     Types: Cigarettes     Quit date: 1996     Years since quittin.0    Smokeless tobacco: Never   Substance and Sexual Activity    Alcohol use: No     Alcohol/week: 0.0 standard drinks of alcohol    Drug use: No   Other Topics Concern    Caffeine Concern Yes     Comment: 2 cups coffee    Exercise Yes     Comment: aerobic, walking 3-4 times a week    Seat Belt No    Special Diet No    Stress Concern No    Weight Concern No     Social Determinants of Health     Food Insecurity: No Food Insecurity (2023)    Food Insecurity     Food Insecurity: Never true   Transportation Needs: No Transportation Needs (2023)    Transportation Needs     Lack of Transportation: No   Housing Stability: Low Risk  (2023)    Housing Stability     Housing Instability: No     Allergies/Medications:   Allergies:   Allergies   Allergen Reactions    Sulfa Antibiotics RASH     Medications Prior to Admission   Medication Sig    cholecalciferol (VITAMIN D3) 125 MCG (5000 UT) Oral Cap Take 1 capsule (5,000 Units total) by mouth daily.    Vitamin C 500 MG Oral Tab Take 1 tablet (500 mg total) by mouth daily.     cyanocobalamin 1000 MCG Oral Tab Take 1 tablet (1,000 mcg total) by mouth daily.    Omega-3-acid Ethyl Esters 1 g Oral Cap Take 1 capsule (1 g total) by mouth daily.    Multiple Vitamins-Minerals (QC MENS DAILY MULTIVITAMIN) Oral Tab Take by mouth.    niacin 500 MG Oral Tab Take 1 tablet (500 mg total) by mouth daily with breakfast.    rosuvastatin 10 MG Oral Tab Take 1 tablet (10 mg total) by mouth nightly. (Patient not taking: Reported on 12/27/2023)    fluticasone propionate 50 MCG/ACT Nasal Suspension 2 sprays by Nasal route daily.    acetaminophen 500mg Take 500 mg by mouth every 6 (six) hours as needed.         Review of Systems:     Constitutional:  Negative for chills and fever.   Respiratory:  Negative for cough and shortness of breath.    Cardiovascular:  Negative for chest pain and palpitations.   Gastrointestinal:  Negative for nausea, vomiting and diarrhea.   Musculoskeletal:  Negative for back pain.   Neurological:  Negative for dizziness and speech difficulty.   Psychiatric/Behavioral:  Negative for confusion and agitation.        Physical Exam:   Vital Signs:  Blood pressure 107/82, pulse 115, temperature 98 °F (36.7 °C), temperature source Oral, resp. rate 18, height 5' 8\" (1.727 m), weight 172 lb (78 kg), SpO2 96%.  Physical Exam  Vitals and nursing note reviewed.   Constitutional:       Appearance: Normal appearance.   HENT:      Head: Normocephalic and atraumatic.      Nose: Nose normal.   Cardiovascular:      Rate and Rhythm: Tachycardia present.      Pulses: Normal pulses.      Heart sounds: Normal heart sounds.   Pulmonary:      Effort: Pulmonary effort is normal.      Breath sounds: Normal breath sounds.   Abdominal:      General: Bowel sounds are normal.      Palpations: Abdomen is soft.   Musculoskeletal:         General: Normal range of motion.      Cervical back: Normal range of motion.   Skin:     General: Skin is warm and dry.   Neurological:      General: No focal deficit present.       Mental Status: He is alert and oriented to person, place, and time.   Psychiatric:         Mood and Affect: Mood normal.         Behavior: Behavior normal.           Results:     Lab Results   Component Value Date    WBC 11.0 12/27/2023    HGB 12.7 (L) 12/27/2023    HCT 36.5 (L) 12/27/2023    .0 12/27/2023    CREATSERUM 1.04 12/27/2023    BUN 16 12/27/2023     (L) 12/27/2023    K 4.2 12/27/2023     12/27/2023    CO2 21.0 12/27/2023     (H) 12/27/2023    CA 9.0 12/27/2023    ALB 4.3 12/27/2023    ALKPHO 60 12/27/2023    BILT 0.8 12/27/2023    TP 7.5 12/27/2023    AST 39 (H) 12/27/2023    ALT 33 12/27/2023    T4F 0.81 06/08/2018    TSH 1.842 11/02/2023    PSA 1.0 06/08/2018    DDIMER 0.94 (H) 12/27/2023    TROPHS 10 12/27/2023     10/28/2022    B12 709 06/08/2018     CT CHEST PE AORTA (IV ONLY) (CPT=71260)    Result Date: 12/27/2023  CONCLUSION:   No PE  9 x 5.5 centimeter area of ground-glass opacity containing small internal areas of pulmonary consolidation in the periphery of the right lower lobe most likely representing pulmonary infection.  There are small foci of airways inflammation/infection in the apical RUL  Spiculated 2.1 x 1.8 centimeter pulmonary nodule in the anterior segment RUL is concerning for primary pulmonary malignancy.  This is immediately adjacent to the anterior segmental bronchus and should be amenable to bronchoscopy  Mild diffuse intrathoracic adenopathy most pronounced at the right hilum and subcarinal space, indeterminate    Dictated by (CST): Ravindra Cross MD on 12/27/2023 at 8:58 AM     Finalized by (CST): Ravindra Cross MD on 12/27/2023 at 9:11 AM          CT SPINE CERVICAL (CPT=72125)    Result Date: 12/27/2023  CONCLUSION:  1. No acute fracture or posttraumatic malalignment cervical spine.  2. Multilevel degenerative changes are seen throughout the cervical spine.  3. Left greater than right subtotal mastoid effusion; correlation for relevant  symptomatology is advised.  4. Partially visualized at least moderate centrilobular emphysematous disease.  5. Lesser incidental findings as above.    Dictated by (CST): Yuri aKng MD on 12/27/2023 at 7:36 AM     Finalized by (CST): Yuri Kang MD on 12/27/2023 at 7:39 AM          CT BRAIN OR HEAD (93308)    Result Date: 12/27/2023  CONCLUSION:  1. Negative for depressed calvarial fracture, coup/contrecoup intraparenchymal contusion, intracranial hemorrhage, or further evidence of acute intracranial process by noncontrast CT technique.  2. Senescent changes of parenchymal volume loss with sequela of chronic microvascular ischemic disease.  3. There is large vessel atherosclerosis involvement of the anterior and posterior intracranial circulations.  4. Lesser incidental findings as above.      Dictated by (CST): Yuri Kang MD on 12/27/2023 at 7:34 AM     Finalized by (CST): Yuri Kang MD on 12/27/2023 at 7:36 AM          XR CHEST AP PORTABLE  (CPT=71045)    Result Date: 12/27/2023  CONCLUSION:  Stable chest demonstrating borderline cardiomegaly without radiographically evident acute intrathoracic process.    Dictated by (CST): Yrui Kang MD on 12/27/2023 at 7:14 AM     Finalized by (CST): Yuri Kang MD on 12/27/2023 at 7:15 AM             Assessment/Plan:   *Syncope, near  CT spine and brain negative for acute process  Troponin negative   Fall precautions  Monitor    *Pulmonary consolidation   CT chest: ground glass opacity and pulmonary consolidation most likely representing pulmonary infection  Afebrile/no leukocytosis  On RA  Pulmonology consulted - recs appreciated    *Pulmonary malignancy-suspicious per CT  CT chest: 2.1 x 1.8 cm pulmonary nodule concerning for primary pulmonary malignancy     *HLD  Cont rosuvastatin       DVT prophylaxis: SCDs  Code status: FULL CODE    ELEAZAR Dumont MD  12/27/2023      Electronically signed by Oziel Varela MD on 12/27/2023   7:54 PM              Consults - MD Consult Notes      Consults signed by Nicolas Chavez MD at 2023  8:34 AM     Author: Nicolas Chavez MD Service: Neurology Author Type: Physician    Filed: 2023  8:34 AM Date of Service: 2023  8:29 AM Status: Signed    : Nicolas Chavez MD (Physician)       [facility]    Report of Consultation    Ronn Blank Patient Status:  Inpatient    1950 MRN [id number]   Location [facility] 2W/SW Attending Oziel Sanders MD   Hosp Day # 1 PCP OZIEL SANDERS MD     Date of Admission:  2023  Date of Consult:  2023  Reason for Consultation:   Episodes of shaking.    History of Present Illness:   Patient is a 73 year old male who was admitted to the hospital for Syncope, near:  Patient with a recent COVID infection in 2023, since then has been continued with coughing, not feeling well, chronic fatigue.  Apparently fell from the bed and therefore brought to the hospital in 2023.  Was found to have pneumonia, CT of the chest also showed possibility of some other lesions.  CT of the brain was done and it was not revealing.  Patient had few episodes of shaking, tremulousness, with tachycardia in 130s, briefly desaturated with one of the episodes, but came back quickly.  There was no obvious loss of consciousness, his wife felt that his shaking was consistent with a seizure and not shivering.    Past Medical History  Past Medical History:   Diagnosis Date    Arthritis     Erectile dysfunction     Hearing impairment     [place] - bilateral hearing aids    Hypercholesterolemia     Osteoporosis     PONV (postoperative nausea and vomiting)        Past Surgical History  Past Surgical History:   Procedure Laterality Date    COLONOSCOPY      DENTAL SURGERY PROCEDURE      extensive tental surgery - implants and bridges     FRACTURE SURGERY Right 2019    Right femur fracture percutaneous reduction  with interlocking intramedullary long femoral nailing    HERNIA SURGERY  1997    hernia repair after spinal surgery     SPINE SURGERY PROCEDURE UNLISTED  1996    traumatic injury to L3 spinal surgery with metal       Family History  Family History   Problem Relation Age of Onset    Cancer Mother         breast    Cancer Sister         colon    Heart Disorder Brother     Other (Other) Other         osteoarthritis, osteoporosis       Social History  Pediatric History   Patient Parents    Not on file     Other Topics Concern    Caffeine Concern Yes     Comment: 2 cups coffee    Exercise Yes     Comment: aerobic, walking 3-4 times a week    Seat Belt No    Special Diet No    Stress Concern No    Weight Concern No   Social History Narrative    Not on file           Current Medications:  Current Facility-Administered Medications   Medication Dose Route Frequency    meperidine (Demerol) 25 MG/ML injection 25 mg  25 mg Intravenous Once    acetaminophen (Tylenol) tab 650 mg  650 mg Oral Q6H PRN    acetaminophen (Tylenol) rectal suppository 650 mg  650 mg Rectal Q6H PRN    glucose (Dex4) 15 GM/59ML oral liquid 15 g  15 g Oral Q15 Min PRN    Or    glucose (Glutose) 40% oral gel 15 g  15 g Oral Q15 Min PRN    Or    glucose-vitamin C (Dex-4) chewable tab 4 tablet  4 tablet Oral Q15 Min PRN    Or    dextrose 50% injection 50 mL  50 mL Intravenous Q15 Min PRN    Or    glucose (Dex4) 15 GM/59ML oral liquid 30 g  30 g Oral Q15 Min PRN    Or    glucose (Glutose) 40% oral gel 30 g  30 g Oral Q15 Min PRN    Or    glucose-vitamin C (Dex-4) chewable tab 8 tablet  8 tablet Oral Q15 Min PRN    insulin regular human (Novolin R, Humulin R) 100 UNIT/ML injection 1-5 Units  1-5 Units Subcutaneous 4 times per day    piperacillin-tazobactam (Zosyn) 3.375 g in dextrose 5% 100 mL IVPB-ADDV  3.375 g Intravenous Q8H    rosuvastatin (Crestor) tab 10 mg  10 mg Oral Nightly    sodium chloride 0.45% infusion   Intravenous Continuous    LORazepam  (Ativan) tab 1 mg  1 mg Oral Q6H PRN     Medications Prior to Admission   Medication Sig    cholecalciferol (VITAMIN D3) 125 MCG (5000 UT) Oral Cap Take 1 capsule (5,000 Units total) by mouth daily.    Vitamin C 500 MG Oral Tab Take 1 tablet (500 mg total) by mouth daily.    cyanocobalamin 1000 MCG Oral Tab Take 1 tablet (1,000 mcg total) by mouth daily.    Omega-3-acid Ethyl Esters 1 g Oral Cap Take 1 capsule (1 g total) by mouth daily.    Multiple Vitamins-Minerals (QC MENS DAILY MULTIVITAMIN) Oral Tab Take by mouth.    niacin 500 MG Oral Tab Take 1 tablet (500 mg total) by mouth daily with breakfast.    rosuvastatin 10 MG Oral Tab Take 1 tablet (10 mg total) by mouth nightly. (Patient not taking: Reported on 12/27/2023)    fluticasone propionate 50 MCG/ACT Nasal Suspension 2 sprays by Nasal route daily.    acetaminophen 500mg Take 500 mg by mouth every 6 (six) hours as needed.         Allergies  Allergies   Allergen Reactions    Sulfa Antibiotics RASH       Review of Systems:   As in HPI, the rest of the 14 system review was done and was negative    Physical Exam:     Vitals:    12/29/23 0400 12/29/23 0500 12/29/23 0600 12/29/23 0700   BP: 135/86 141/88 154/78 (!) 128/94   Pulse: 115 120 116 (!) 123   Resp: (!) 37 (!) 37 (!) 40 (!) 29   Temp: 99.7 °F (37.6 °C)      TempSrc: Temporal      SpO2: 95% 94% 97% 98%   Weight:       Height:           General: No apparent distress, well nourished, well groomed.  Head- Normocephalic, atraumatic  Eyes- No redness or swelling  ENT-very poor hearing  Neck- No masses or adenopathy  Cv: pulses were palpable and normal, no cyanosis or edema     Neurological:     Mental Status- Alert, appears anxious, but able to tell me the year, month and where he was.    Cranial Nerves:  II.- Visual fields full to confrontation    VII. Face symmetric, no facial weakness  VIII. Hearing significantly impaired.  IX. Pallet elevates symmetrically.  XI. Shoulder shrug is intact  XII. Tongue is  midline    Motor Exam:  Muscle tone normal  No atrophy or fasciculations  Strength- upper extremities 5/5 proximally and distally                    Limited examination, patient is very hard of hearing.  During examination he had an episode of tremulousness, somewhat rhythmic, but without any evaluation and evaluation bilateral shaking and tremulousness, he was able to answer questions during this event.    Results:     Laboratory Data:  Lab Results   Component Value Date    WBC 8.4 12/29/2023    HGB 13.1 12/29/2023    HCT 38.7 (L) 12/29/2023    .0 12/29/2023    CREATSERUM 1.03 12/29/2023    BUN 16 12/29/2023     (L) 12/29/2023    K 3.7 12/29/2023    CL 99 12/29/2023    CO2 24.0 12/29/2023     (H) 12/29/2023    CA 8.8 12/29/2023    ALB 4.3 12/27/2023    ALKPHO 60 12/27/2023    TP 7.5 12/27/2023    AST 39 (H) 12/27/2023    ALT 33 12/27/2023    T4F 0.81 06/08/2018    TSH 1.842 11/02/2023    PSA 1.0 06/08/2018    DDIMER 0.94 (H) 12/27/2023     10/28/2022    B12 709 06/08/2018         Imaging:    XR CHEST AP PORTABLE  (CPT=71045)    Result Date: 12/28/2023  CONCLUSION:  1. Patchy right greater than left basilar opacities are new or more conspicuous since previous day chest radiograph.  Findings are concerning for pneumonia/aspiration and radiographic follow-up to resolution is advised. 2. Subtle spiculated right upper lobe nodule is suspected and better assessed on previous day chest CT.   Dictated by (CST): Mukesh Pierre MD on 12/28/2023 at 8:07 PM     Finalized by (CST): Mukesh Pierre MD on 12/28/2023 at 8:08 PM          CT CHEST PE AORTA (IV ONLY) (CPT=71260)    Result Date: 12/27/2023  CONCLUSION:   No PE  9 x 5.5 centimeter area of ground-glass opacity containing small internal areas of pulmonary consolidation in the periphery of the right lower lobe most likely representing pulmonary infection.  There are small foci of airways inflammation/infection in the apical RUL  Spiculated 2.1 x  1.8 centimeter pulmonary nodule in the anterior segment RUL is concerning for primary pulmonary malignancy.  This is immediately adjacent to the anterior segmental bronchus and should be amenable to bronchoscopy  Mild diffuse intrathoracic adenopathy most pronounced at the right hilum and subcarinal space, indeterminate    Dictated by (CST): Ravindra Cross MD on 12/27/2023 at 8:58 AM     Finalized by (CST): Ravindra Cross MD on 12/27/2023 at 9:11 AM             CT of the head was independently reviewed, obvious acute changes or any obvious edema.    Impression:       Patient with a pneumonia, febrile, frequent shivering episodes.  Some concern for possibility of seizure activity and therefore continuous EEG will be started.      However at this point description of the events and observed event was not very consistent with a seizure and therefore we will hold off of antiepileptic medication unless EEG shows evidence of epileptic activity.    Since there was also mention of some nodule that could be concerning for the malignancy on CT of the chest we will also do MRI of the brain to rule out any other etiology.    Thank you for allowing me to participate in the care of your patient.    Nicolas Chavez MD  12/29/2023        Electronically signed by Nicolas Chavez MD on 12/29/2023  8:34 AM           D/C Summary    No notes of this type exist for this encounter.        Physical Therapy Notes (last 72 hours)      Physical Therapy Note signed by Shila Araiza PTA at 1/4/2024 11:43 AM  Version 1 of 1    Author: Shila Araiza PTA Service: Rehab Author Type: Physical Therapist    Filed: 1/4/2024 11:43 AM Date of Service: 1/4/2024 11:40 AM Status: Signed    : Shila Araiza PTA (Physical Therapist)       PHYSICAL THERAPY TREATMENT NOTE - INPATIENT     Room Number: 511/511-A       Presenting Problem: syncope-fall out of bed, increased confusion    Problem List  Principal Problem:    Syncope,  near  Active Problems:    Hyponatremia    Dehydration    Shivering    Altered mental status    Toxic encephalopathy      PHYSICAL THERAPY ASSESSMENT   Chart reviewed. RN  approved participation in physical therapy.  PPE worn by therapist: mask, gloves, and goggles. Patient was wearing a mask during session. Patient presented in bed with 6/10 pain. Patient with good  progress towards goals during this session. Education provided on Physical therapy plan of care and physiological benefits of out of bed mobility. Patient with good carryover.     Bed mobility: Mod assist  Transfers: Mod assist  Gait Assistance: Minimum assistance  Distance (ft): 2 x 30  Assistive Device: Rolling walker  Pattern: Shuffle          Pt seen daily.Mod a for bed mobility and transfer.EOB sitting balance activity with emphasis on core stabilization .Pt educated on deep breathing and relaxation technique.Family present;family education;all questions  and concerns addressed.Pt amb 2 x 30 ft with RW and Min a.Cuing for gait pattern as well as for postural awareness..There ex.Patient was left in bedside chair at end of session with all needs in reach. The patient's Approx Degree of Impairment: 64.91% has been calculated based on documentation in the [facility] '6 clicks' Inpatient Basic Mobility Short Form.  Research supports that patients with this level of impairment may benefit from Subacute Rehab. Sub-acute Rehabilitation.. RN aware of patient status post session.    DISCHARGE RECOMMENDATIONS  PT Discharge Recommendations: Sub-acute rehabilitation     PLAN  PT Treatment Plan: Bed mobility;Body mechanics;Endurance;Gait training    SUBJECTIVE  Pt reports being ready for PT RX    OBJECTIVE  Precautions: Bed/chair alarm    WEIGHT BEARING RESTRICTION  Weight Bearing Restriction: None                PAIN ASSESSMENT   Ratin          BALANCE                                                                                                                        Static Sitting: Good  Dynamic Sitting: Fair +           Static Standing: Fair -  Dynamic Standing: Poor +    ACTIVITY TOLERANCE                         O2 WALK       AM-PAC '6-Clicks' INPATIENT SHORT FORM - BASIC MOBILITY  How much difficulty does the patient currently have...  Patient Difficulty: Turning over in bed (including adjusting bedclothes, sheets and blankets)?: A Little   Patient Difficulty: Sitting down on and standing up from a chair with arms (e.g., wheelchair, bedside commode, etc.): A Lot   Patient Difficulty: Moving from lying on back to sitting on the side of the bed?: A Lot   How much help from another person does the patient currently need...   Help from Another: Moving to and from a bed to a chair (including a wheelchair)?: A Lot   Help from Another: Need to walk in hospital room?: A Lot   Help from Another: Climbing 3-5 steps with a railing?: A Lot     AM-PAC Score:  Raw Score: 13   Approx Degree of Impairment: 64.91%   Standardized Score (AM-PAC Scale): 36.74   CMS Modifier (G-Code): CL      Additional information:     THERAPEUTIC EXERCISES  Lower Extremity Ankle pumps  Glut sets  Quad sets     Position Supine       Patient End of Session: In bed;Call light within reach;RN aware of session/findings;All patient questions and concerns addressed    CURRENT GOALS     Patient Goal Patient's self-stated goal is: did not describe   Goal #1 Patient is able to demonstrate supine - sit EOB @ level: Supervision     Goal #1   Current Status Mod a   Goal #2 Patient is able to demonstrate transfers Sit to/from Stand at assistance level: supervision with walker - rolling     Goal #2  Current Status Mod a   Goal #3 Patient is able to ambulate 150 feet with assist device: walker - rolling at assistance level: CG   Goal #3   Current Status Pt amb 2 x 30 ft with RW and Min a   Goal #4 Patient will negotiate 5 stairs/one curb w/ assistive device and supervision   Goal #4   Current Status NT   Goal #5 Patient  to demonstrate independence with home activity/exercise instructions provided to patient in preparation for discharge.   Goal #5   Current Status In progress   Gait -10 minutes; There activity-20 minutes.       Physical Therapy Note signed by Shila Araiza PTA at 1/3/2024 10:35 AM  Version 1 of 1    Author: Shila Araiza PTA Service: Rehab Author Type: Physical Therapist    Filed: 1/3/2024 10:35 AM Date of Service: 1/3/2024 10:24 AM Status: Signed    : Shila Araiza PTA (Physical Therapist)       PHYSICAL THERAPY TREATMENT NOTE - INPATIENT     Room Number: 511/511-A       Presenting Problem: syncope-fall out of bed, increased confusion    Problem List  Principal Problem:    Syncope, near  Active Problems:    Hyponatremia    Dehydration    Shivering    Altered mental status    Toxic encephalopathy      PHYSICAL THERAPY ASSESSMENT   Chart reviewed. RN  approved participation in physical therapy.  PPE worn by therapist: mask, gloves, and goggles. Patient was wearing a mask during session. Patient presented in bed with 6/10 pain. Patient with good  progress towards goals during this session. Education provided on Physical therapy plan of care and physiological benefits of out of bed mobility. Patient with good carryover.     Bed mobility: Mod assist  Transfers: Mod assist  Gait Assistance: Moderate assistance  Distance (ft): 5-6 small side steps  Assistive Device:  (holding on PTA)  Pattern: Shuffle          Pt seen daily.Mod a for bed mobility and transfer.EOB sitting balance activity with emphasis on core stabilization .Pt educated on deep breathing and relaxation technique.Family present;family education;all questions  and concerns addressed.Pt amb 5 -6 small side steps bed to chair with mod a holding on PTA.Cuing for technique provided.There ex.Patient was left in bedside chair at end of session with all needs in reach. The patient's Approx Degree of Impairment: 64.91% has been calculated  based on documentation in the [facility] '6 clicks' Inpatient Basic Mobility Short Form.  Research supports that patients with this level of impairment may benefit from Subacute Rehab. Sub-acute Rehabilitation.. RN aware of patient status post session.    DISCHARGE RECOMMENDATIONS  PT Discharge Recommendations: Sub-acute rehabilitation     PLAN  PT Treatment Plan: Bed mobility;Body mechanics;Endurance;Energy conservation;Patient education;Family education;Gait training;Strengthening;Range of motion;Balance training;Transfer training    SUBJECTIVE  Pt reports being ready for PT RX    OBJECTIVE  Precautions: Bed/chair alarm    WEIGHT BEARING RESTRICTION  Weight Bearing Restriction: None                PAIN ASSESSMENT   Ratin          BALANCE                                                                                                                       Static Sitting: Good  Dynamic Sitting: Fair +           Static Standing: Fair -  Dynamic Standing: Poor +    ACTIVITY TOLERANCE                         O2 WALK       AM-PAC '6-Clicks' INPATIENT SHORT FORM - BASIC MOBILITY  How much difficulty does the patient currently have...  Patient Difficulty: Turning over in bed (including adjusting bedclothes, sheets and blankets)?: A Little   Patient Difficulty: Sitting down on and standing up from a chair with arms (e.g., wheelchair, bedside commode, etc.): A Lot   Patient Difficulty: Moving from lying on back to sitting on the side of the bed?: A Lot   How much help from another person does the patient currently need...   Help from Another: Moving to and from a bed to a chair (including a wheelchair)?: A Lot   Help from Another: Need to walk in hospital room?: A Lot   Help from Another: Climbing 3-5 steps with a railing?: A Lot     AM-PAC Score:  Raw Score: 13   Approx Degree of Impairment: 64.91%   Standardized Score (AM-PAC Scale): 36.74   CMS Modifier (G-Code): CL      Additional information:     THERAPEUTIC  EXERCISES  Lower Extremity Ankle pumps  Glut sets  Quad sets     Position Supine       Patient End of Session: In bed;Needs met;Call light within reach;RN aware of session/findings;Restraints;Alarm set;Family present    CURRENT GOALS     Patient Goal Patient's self-stated goal is: did not describe   Goal #1 Patient is able to demonstrate supine - sit EOB @ level: Supervision     Goal #1   Current Status Mod a   Goal #2 Patient is able to demonstrate transfers Sit to/from Stand at assistance level: supervision with walker - rolling     Goal #2  Current Status Mod a   Goal #3 Patient is able to ambulate 150 feet with assist device: walker - rolling at assistance level: CG   Goal #3   Current Status Pt amb 5-6 small side steps bed to chair mod a   Goal #4 Patient will negotiate 5 stairs/one curb w/ assistive device and supervision   Goal #4   Current Status NT   Goal #5 Patient to demonstrate independence with home activity/exercise instructions provided to patient in preparation for discharge.   Goal #5   Current Status In progress   There ex-10 minutes; There activity-20 minutes.             Occupational Therapy Notes (last 72 hours)  Notes from 1/1/2024 12:56 PM through 1/4/2024 12:56 PM   No notes of this type exist for this encounter.     Video Swallow Study Notes    No notes of this type exist for this encounter.        SLP Note - SLP Notes      SLP Note signed by Ema Yusuf SLP at 1/3/2024  9:28 AM  Version 1 of 1    Author: Ema Yusuf SLP Service: — Author Type: Speech and Language Pathologist    Filed: 1/3/2024  9:28 AM Date of Service: 1/3/2024  9:22 AM Status: Signed    : Ema Yusuf SLP (Speech and Language Pathologist)       SPEECH DAILY NOTE - INPATIENT    ASSESSMENT & PLAN   ASSESSMENT  Pt seen to monitor tolerance of PO diet, train compensatory strategies and assess candidacy for diet upgrade.  Pt afebrile and on 5L HF O2 NC. He was alert and cooperative sitting  upright in bed, talking easily and in good spirits.  Presented trials of current diet as well as hard solid and thin liquids.      Oral phase was within normal limits with adequate bilabial seal and bolus containment, timely mastication and transit and no oral retention.  The pharyngeal response appeared timely with adequate laryngeal excursion.  Cough x1 with thin liquids in consecutive drinks by straw.  No cough with single drinks by straw or by cup.      Recommend upgrade to general diet with thin liquids, single sips, no straw.  Will follow x1 to monitor tolerance.  D/W RN.        Diet Recommendations - Solids: Regular  Diet Recommendations - Liquids: Thin Liquids    Compensatory Strategies Recommended: No straws;Slow rate  Aspiration Precautions: Upright position;Slow rate;Small bites and sips;No straw;Cueing to swallow  Medication Administration Recommendations: Whole in puree    Patient Experiencing Pain: No                Discharge Recommendations  Discharge Recommendations/Plan: Undetermined    Treatment Plan  Treatment Plan/Recommendations: Dysphagia therapy;Aspiration precautions    Interdisciplinary Communication: Discussed with RN            GOALS  Goal #1 The patient will tolerate puree consistency and mildly thick liquids without overt signs or symptoms of aspiration with 100 % accuracy over 2 session(s).    Met.  New goal:  Pt will tolerate general diet with thin liquids without overt signs of aspiration with 100% accuracy.  Goal met and upgraded   Goal #2 The patient/family/caregiver will demonstrate understanding and implementation of aspiration precautions and swallow strategies independently over 2 session(s).    Reviewed no straw with patient.  He indicated understanding.  In Progress   Goal #3 The patient will tolerate trial upgrade of soft/regular consistency and thin liquids without overt signs or symptoms of aspiration with 100 % accuracy over 2 session(s).    Goal met.  met       FOLLOW  UP  Follow Up Needed (Documentation Required): Yes  SLP Follow-up Date: 01/04/24  Number of Visits to Meet Established Goals: 2    Session: 1    If you have any questions, please contact BRUNO Riley MA/[facility]-SLP  Speech Language Pathologist  [facility]  Ext. [phone]      Electronically signed by Ema Yusuf SLP on 1/3/2024  9:28 AM           Immunizations     Name Date      Covid-19 Pfizer 05/11/23     Covid-19 Pfizer 03/05/21     Covid-19 Pfizer 02/12/21     Fluad 0.5ml 10/09/19     INFLUENZA 09/14/20     INFLUENZA 10/09/19     INFLUENZA 10/06/18     INFLUENZA 10/06/18     INFLUENZA 10/06/18     INFLUENZA 10/05/18     INFLUENZA 10/26/17     INFLUENZA 10/26/17     INFLUENZA 10/25/17     INFLUENZA 11/06/16     INFLUENZA 11/05/16     INFLUENZA 10/13/15     INFLUENZA 09/30/14     INFLUENZA 09/27/13     INFLUENZA 09/04/09     Influenza 10/22/21     Pfizer Covid-19 Vaccine 30mcg/0.3mL 12yrs+ 10/04/23     Pneumococcal (Prevnar 13) 10/25/17     Pneumovax 23 10/28/22     Pneumovax 23 10/26/17     Pneumovax 23 12/17/14     TD 12/17/11     Tb Intradermal Test 05/05/19     Tb Intradermal Test 04/27/19     Zoster Vaccine Live (Zostavax) 08/26/14       Multidisciplinary Problems     Active Goals        Problem: Patient/Family Goals    Goal Priority Disciplines Outcome Interventions   Patient/Family Long Term Goal     Interdisciplinary Progressing    Description: Patient's Long Term Goal: To discharge from hospital     Interventions:  -  Monitor vital signs   - Monitor appropriate labs   -  Monitor blood glucose levels   - Pain management   - Administer medications per order   - Follow MD orders   - Diagnostics per order   - Update/inform patient and family on plan of care   - Discharge planning    - See additional Care Plan goals for specific interventions   Patient/Family Short Term Goal     Interdisciplinary Progressing    Description: Patient's Short Term Goal: To improve strength      Interventions:   - -  Monitor vital signs   - Monitor appropriate labs   -  Monitor blood glucose levels   - Pain management   - Administer medications per order   - Follow MD orders   - Diagnostics per order   - Update/inform patient and family on plan of care   - See additional Care Plan goals for specific interventions

## (undated) NOTE — ED AVS SNAPSHOT
[facility] in [place] [address]    Phone:  [phone]    Fax:  [phone]           Tommy Booth   MRN: [id number]    Department:  [facility] in [address] [place] [address]   Date of Visit:  3/ related to the care you received in our Immediate Care. Please call our [address] at [phone]. Your Immediate Care team is here to serve you. You are our top priority. You were examined and treated today on an urgent basis only.   This was not I certified that I have received a copy of the aftercare instructions; that these instructions have been explained to me; all questions pertaining to these instructions have been answered in a satisfactory manner.         [address] discharge instructions in [employer] by going to Visits < Admission Summaries. If you've been to the Emergency Department or your doctor's office, you can view your past visit information in [employer] by going to Visits < Visit Summaries. [facility] questions?

## (undated) NOTE — LETTER
[facility]  Administration of Anesthesia  1. I, Elio Offer, or _________________________________ acting on his behalf, (Patient) (Dependent/Representative) request to receive anesthesia for my pending procedure/operation/treatment.   A infections, high spinal block, spinal bleeding, seizure, cardiac arrest and death. 7. AWARENESS: I understand that it is possible (but unlikely) to have explicit memory of events from the operating room while under general anesthesia.   8. ELECTROCONVULSIV unconscious pt /Relationship    My signature below affirms that prior to the time of the procedure, I have explained to the patient and/or his/her guardian, the risks and benefits of undergoing anesthesia, as well as any reasonable alternatives.     _______

## (undated) NOTE — LETTER
[address], [place]  Authorization for Invasive Procedures  1.  I hereby authorize Dr. Elgin Viveros , my physician and whomever may be designated as the doctor's assistant, to perform the following operation and/or procedure:  Lumbar my performed for the purposes of advancing medicine, science, and/or education, provided my identity is not revealed. If the procedure has been videotaped, the physician/surgeon will obtain the original videotape.  The hospital will not be responsible for stor My signature below affirms that prior to the time of the procedure, I have explained to the patient and/or his legal representative, the risks and benefits involved in the proposed treatment and any reasonable alternative to the proposed treatment.  I have

## (undated) NOTE — LETTER
[address] [place] [address]  Authorization for Invasive Procedures  1.  I hereby authorize Dr. Bere Brandon , my physician and whomever may be designated as the doctor's assistant, to perform the following operation and/or procedure: performed for the purposes of advancing medicine, science, and/or education, provided my identity is not revealed. If the procedure has been videotaped, the physician/surgeon will obtain the original videotape.  The hospital will not be responsible for stor My signature below affirms that prior to the time of the procedure, I have explained to the patient and/or his legal representative, the risks and benefits involved in the proposed treatment and any reasonable alternative to the proposed treatment.  I have

## (undated) NOTE — LETTER
[address]  [facility] [address], [place], [place]  [address]  INFORMED CONSENT FOR TRANSFUSION OF BLOOD OR BLOOD PRODUCTS  My physician has informed me of the nature, purpose, benefits and risks of transfusion for blood and blood components that ______________________________________________  (Signature of Patient)                                                            (Responsible party in case of Minor,

## (undated) NOTE — IP AVS SNAPSHOT
Patient Demographics     Address  [address] [place] [address] Phone  [phone] [facility]) *Preferred*  [phone] [facility]) E-mail Address  [email or web address]      Emergency Contact(s)     Name Relation Home Work Marco Adriano CunninghamrELEAZAR         magnesium hydroxide 400 MG/5ML Susp  Commonly known as:  MILK OF MAGNESIA      Take 30 mL by mouth daily as needed for constipation.    Adriano CunninghamrELEAZAR         metoprolol Tartrate 25 MG Tabs  Commonly known as:  Anitha Jackson [id number] PEG 3350 (MIRALAX) powder packet 17 g 04/24/19 0759 Given      [id number] docusate sodium (COLACE) cap 100 mg 04/23/19 2206 Given      [id number] docusate sodium (COLACE) cap 100 mg 04/24/19 0759 Given      [id number] famoTIDine (PEPCID) tab 20 mg 69 yo M with hx of osteoporosis on Prolia injections, with fall yesterday while stepping into an elevator. States he tripped and his body twisted to the right and he fell. He noted pain immediately, and went to ER and found to have right femur fracture.  Se BACLOFEN 20 MG Oral Tab TAKE 1 TABLET BY MOUTH THREE TIMES DAILY   denosumab 60 MG/ML Subcutaneous Solution Inject 1 mL (60 mg total) into the skin every 6 (six) months. DX: M81.8       Review of Systems:     Constitutional: Positive for fatigue.  Negative Xr Femur Min 2 Views Right (cpt=73552)    Result Date: 4/16/2019  CONCLUSION:  1. Displaced proximal to mid shaft right femur fracture with 7.4 cm of overlap of the proximal distal components and posterior displacement of the distal component.   Slight la PHYSICIAN Certification of Need for Inpatient Hospitalization - Initial Certification    Patient will require inpatient services that will reasonably be expected to span two midnight's based on the clinical documentation in H+P.    Based on patients current d: 04/20/2019 07:07:11  t: 04/20/2019 09:24:35  Job [id number]  SSP/  [SP.1]  Electronically signed by Cristal Leon MD on 4/21/2019  3:24 PM   Attribution Ashley Swan MD on 4/21/2019  7:54 AM                     D/C Summary    Nikko Dominguez easily fatigue during activity and req freq rest break between. [atient left sitting in chair with all needs in reach, wife and OT present. The patient's[DK.1] Approx Degree of Impairment: 72.57%[DK. 2] has been calculated based on documentation in th AM-PAC Score:[DK.1]  Raw Score: 11   Approx Degree of Impairment: 72.57%   Standardized Score (AM-PAC Scale): 33.86   CMS Modifier (G-Code): CL[DK. 2]    FUNCTIONAL ABILITY STATUS  Gait Assessment[DK. 1]   Gait Assistance:  Moderate assistance  Distance (ft): PM  Version 1 of 1    Author:  Diane Gee PTA Service:  Physical Medicine and Rehabilitation Author Type:  Physical Therapist    Filed:  4/23/2019  3:17 PM Date of Service:  4/23/2019  3:05 PM Status:  Signed    :  Diane Gee PTA (Physi Weight Bearing Restriction: R lower extremity        R Lower Extremity: Toe Touch Weight Bearing  L Lower Extremity: (None )[DK.2]    PAIN ASSESSMENT[DK. 1]   Ratin  Location: rt hip  Management Techniques: Activity promotion;Repositioning[DK. 2]    PEYTON met;Alarm set[DK. 2]    CURRENT GOALS[DK. 1]     Goals to be met by: 2 wks  Patient Goal Patient's self-stated goal is: Return home   Goal #1 Patient is able to demonstrate supine - sit EOB @ level: minimum assistance      Goal #1   Current Status NT receive Closed displaced transverse fracture of shaft of right femur (HCC)    Drug-induced constipation    Difficulty walking[RM. 2]      PHYSICAL THERAPY ASSESSMENT   Pt is received in the chair. Pt reported he wanted to use the bathroom.  Pt said that he had to Weight Bearing Restriction: R lower extremity        R Lower Extremity: Toe Touch Weight Bearing  L Lower Extremity: (None )[RM.2]    PAIN ASSESSMENT[RM.1]   Rating: (did not rate)  Location: R hip  Management Techniques: Activity promotion; Body mechanics; aware of session/findings; All patient questions and concerns addressed; Family present[RM. 2]    CURRENT GOALS     Goals to be met by: 2 wks  Patient Goal Patient's self-stated goal is: Return home   Goal #1 Patient is able to demonstrate supine - sit EOB @ Pain of right hip joint    Closed displaced transverse fracture of shaft of right femur (HCC)    Drug-induced constipation    Difficulty walking      OCCUPATIONAL THERAPY ASSESSMENT     Pt seen bedside and performed supine to sit with min assist and incr Precautions: Limb alert - right    WEIGHT BEARING RESTRICTION  Weight Bearing Restriction: R lower extremity        R Lower Extremity: Toe Touch Weight Bearing  L Lower Extremity: (None )    PAIN ASSESSMENT  Rating: Unable to rate  Location: ('the pain is OT Goals:    Patient will complete LE dressing with SBA Mod asssist with instruction in [facility] AE.  Pt stating already using reacher at home    Patient will complete functional t/f with SBA  Comment: Progressing-min A x1-2 w/RW    Patient will complete self car some redirection at times.  Focus of session was transfer skills as he had already gone to the bathroom prior to OT arrival. Pt would benefit from continued skilled OT to address increasing balance, strength, and activity tolerance to maximize his independe Standardized Score (AM-PAC Scale): 35.96  CMS Modifier (G-Code): CK    FUNCTIONAL TRANSFER ASSESSMENT  Supine to Sit : Not tested(Out of bed at this julius e)  Sit to Stand: Moderate assistance(x2) w/RW  Toilet Transfer: mod.  A x1-2 w/RW  Shower Transfer: n/t Provider Department Center    6/14/2019 9:40 AM MD Concha Kidd [facility] Esthela Brian Ce

## (undated) NOTE — LETTER
[address] [place] [address] Rd, [place], [place]     AUTHORIZATION FOR SURGICAL OPERATION OR PROCEDURE    I hereby authorize Dr. Sherice Barraza MD, my Physician(s) and whomever may be designated as the doctor's Assistant, to perform the f 4. I consent to the photographing of procedure(s) to be performed for the purposes of advancing medicine, science and/or education, provided my identity is not revealed.  If the procedure has been videotaped, the physician/surgeon will obtain the original v (Witness signature)                                                                                                  (Date)                                (Time)  STATEMENT OF PHYSICIAN My signature below affirms that prior to the time of the procedure;  I